# Patient Record
Sex: MALE | Race: WHITE | Employment: OTHER | URBAN - METROPOLITAN AREA
[De-identification: names, ages, dates, MRNs, and addresses within clinical notes are randomized per-mention and may not be internally consistent; named-entity substitution may affect disease eponyms.]

---

## 2022-06-09 RX ORDER — LOSARTAN POTASSIUM 50 MG/1
50 TABLET ORAL DAILY
COMMUNITY

## 2022-06-09 RX ORDER — OMEPRAZOLE 20 MG/1
20 CAPSULE, DELAYED RELEASE ORAL DAILY
COMMUNITY

## 2022-06-09 RX ORDER — TORSEMIDE 10 MG/1
10 TABLET ORAL DAILY
COMMUNITY

## 2022-06-09 RX ORDER — ATORVASTATIN CALCIUM 40 MG/1
40 TABLET, FILM COATED ORAL NIGHTLY
COMMUNITY

## 2022-06-09 RX ORDER — ASPIRIN 81 MG/1
81 TABLET ORAL DAILY
COMMUNITY

## 2022-06-09 RX ORDER — INSULIN LISPRO 100 [IU]/ML
INJECTION, SOLUTION INTRAVENOUS; SUBCUTANEOUS
COMMUNITY

## 2022-06-09 RX ORDER — INSULIN GLARGINE 100 [IU]/ML
INJECTION, SOLUTION SUBCUTANEOUS NIGHTLY
COMMUNITY

## 2022-06-09 RX ORDER — AMLODIPINE BESYLATE 2.5 MG/1
2.5 TABLET ORAL NIGHTLY
COMMUNITY

## 2022-06-09 RX ORDER — LEVOTHYROXINE SODIUM 0.12 MG/1
125 TABLET ORAL DAILY
COMMUNITY

## 2022-06-09 RX ORDER — GABAPENTIN 300 MG/1
300 CAPSULE ORAL 4 TIMES DAILY
COMMUNITY

## 2022-06-09 NOTE — PROGRESS NOTES
Patient sent link to watch Joint Education Class  Emailed joint education materials to patient about medication education, helpful tips, pre-surgical checklist, and pre-op showering instructions. Received an e-mail confirmation of patient viewing video. Pre-test and post-test done. Patient is aware that may contact me for any questions. My work email address and phone number given. Pre-test:4/5  Post-test:4/5    Sent patient answer key and explanation of correct answers. DOS: 7/14  Dr Leif Sagastume Nurse Navigator  [phone]  [email or web address]

## 2022-06-09 NOTE — PROGRESS NOTES
Spoke with wife. Patient would like to do outpatient surgery, had a bad experience staying overnight in the hospital.  Discussed the same day surgery process and recovery. Patient would like to have home care after surgery. Has Medicare and lives in [facility].

## 2022-06-09 NOTE — PROGRESS NOTES
It is strongly suggested someone stay with you the first 24 hrs. Your surgery will be cancelled if you do not have a ride home. 8. A parent/legal guardian must accompany a child scheduled for surgery and plan to stay at the hospital until the child is discharged. Please do not bring other children with you. 9. Please wear simple, loose fitting clothing to the hospital.  Katelyn Perez not bring valuables (money, credit cards, checkbooks, etc.) Do not wear any makeup (including no eye makeup) or nail polish on your fingers or toes. 10. DO NOT wear any jewelry or piercings on day of surgery. All body piercing jewelry must be removed. 11. If you have ___dentures, they will be removed before going to the OR; we will provide you a container. If you wear ___contact lenses or ___glasses, they will be removed; please bring a case for them. 12. Please see your family doctor/pediatrician for a history & physical and/or concerning medications. Bring any test results/reports from your physician's office. PCP__________________Phone___________H&P Appt. Date________             13 If you  have a Living Will and Durable Power of  for Healthcare, please bring in a copy. 15. Notify your Surgeon if you develop any illness between now and surgery  time, cough, cold, fever, sore throat, nausea, vomiting, etc.  Please notify your surgeon if you experience dizziness, shortness of breath or blurred vision between now & the time of your surgery             15. DO NOT shave your operative site 96 hours prior to surgery. For face & neck surgery, men may use an electric razor 48 hours prior to surgery. 16. Shower the night before or morning of surgery using an antibacterial soap or as you have been instructed. 17. To provide excellent care visitors will be limited to one in the room at any given time. 18.  Please bring picture ID and insurance card. 19.  Visit our web site for additional information:  [email or web address]              20.During flu season no children under the age of 15 are permitted in the hospital for the safety of all patients. x    21. If you take a long acting insulin in the evening only  take half of your usual  dose the night  before your procedure              22. If you use a c-pap please bring DOS if staying overnight,             23.For your convenience [id number] has a pharmacy on site to fill your prescriptions. 24. If you use oxygen and have a portable tank please bring it  with you the DOS             25. Bring a complete list of all your medications with name and dose include any supplements. 26. Other__________________________________________   *Please call pre admission testing if you any further questions   [facility]   [address] [place] [address]. [facility]  [phone]   [address] [place] [address]       VISITOR POLICY(subject to change)    Current policy is 2 visitors per patient. No children. A mask is required. Visiting hours are 8a-8p. Overnight visitors will be at the discretion of the nurse. All above information reviewed with patient in person or by phone. Patient verbalizes understanding. All questions and concerns addressed.                                                                                                  Patient/Rep___per phone/pt_________________                                                                                                                                    PRE OP INSTRUCTIONS

## 2022-06-09 NOTE — PROGRESS NOTES
The patient will attend class on___virtual  ST aware____________  The patent will get ordered PATs on__by 7/1 at PCP_________________________________  The patient will see PCP within 30 days of scheduled surgery___7/7_______

## 2022-06-21 NOTE — PROGRESS NOTES
Pre-op instructions reviewed with patients wife. They are going to come to [facility] for the PATs and  a folder-they have the soap.     Confirmed time change on 7/14/  to 1100 with an arrival of 0900

## 2022-06-22 ENCOUNTER — HOSPITAL ENCOUNTER (OUTPATIENT)
Age: 82
Discharge: HOME | End: 2022-06-22
Payer: MEDICARE

## 2022-06-22 DIAGNOSIS — Z01.818 PREOP TESTING: ICD-10-CM

## 2022-06-22 LAB
A/G RATIO: 1.5 (ref 1.1–2.2)
ABO/RH: NORMAL
ALBUMIN SERPL-MCNC: 4.3 G/DL (ref 3.4–5)
ALP BLD-CCNC: 80 U/L (ref 40–129)
ALT SERPL-CCNC: 17 U/L (ref 10–40)
ANION GAP SERPL CALCULATED.3IONS-SCNC: 15 MMOL/L (ref 3–16)
ANTIBODY SCREEN: NORMAL
APTT: 36.6 SEC (ref 23–34.3)
AST SERPL-CCNC: 25 U/L (ref 15–37)
BASOPHILS ABSOLUTE: 0 K/UL (ref 0–0.2)
BASOPHILS RELATIVE PERCENT: 0.5 %
BILIRUB SERPL-MCNC: 0.6 MG/DL (ref 0–1)
BILIRUBIN URINE: NEGATIVE
BLOOD, URINE: NEGATIVE
BUN BLDV-MCNC: 36 MG/DL (ref 7–20)
CALCIUM SERPL-MCNC: 9.1 MG/DL (ref 8.3–10.6)
CHLORIDE BLD-SCNC: 101 MMOL/L (ref 99–110)
CLARITY: CLEAR
CO2: 23 MMOL/L (ref 21–32)
COLOR: YELLOW
CREAT SERPL-MCNC: 1.5 MG/DL (ref 0.8–1.3)
EOSINOPHILS ABSOLUTE: 0.1 K/UL (ref 0–0.6)
EOSINOPHILS RELATIVE PERCENT: 0.9 %
GFR AFRICAN AMERICAN: 54
GFR NON-AFRICAN AMERICAN: 45
GLUCOSE BLD-MCNC: 42 MG/DL (ref 70–99)
GLUCOSE URINE: NEGATIVE MG/DL
HCT VFR BLD CALC: 43 % (ref 40.5–52.5)
HEMOGLOBIN: 14.8 G/DL (ref 13.5–17.5)
INR BLD: 1.5 (ref 0.87–1.14)
KETONES, URINE: NEGATIVE MG/DL
LEUKOCYTE ESTERASE, URINE: NEGATIVE
LYMPHOCYTES ABSOLUTE: 2.1 K/UL (ref 1–5.1)
LYMPHOCYTES RELATIVE PERCENT: 32.9 %
MCH RBC QN AUTO: 33.7 PG (ref 26–34)
MCHC RBC AUTO-ENTMCNC: 34.3 G/DL (ref 31–36)
MCV RBC AUTO: 98.1 FL (ref 80–100)
MICROSCOPIC EXAMINATION: NORMAL
MONOCYTES ABSOLUTE: 0.3 K/UL (ref 0–1.3)
MONOCYTES RELATIVE PERCENT: 4.7 %
NEUTROPHILS ABSOLUTE: 3.8 K/UL (ref 1.7–7.7)
NEUTROPHILS RELATIVE PERCENT: 61 %
NITRITE, URINE: NEGATIVE
PDW BLD-RTO: 13.3 % (ref 12.4–15.4)
PH UA: 6 (ref 5–8)
PLATELET # BLD: 140 K/UL (ref 135–450)
PMV BLD AUTO: 9.6 FL (ref 5–10.5)
POTASSIUM SERPL-SCNC: 4.1 MMOL/L (ref 3.5–5.1)
PROTEIN UA: NEGATIVE MG/DL
PROTHROMBIN TIME: 18.1 SEC (ref 11.7–14.5)
RBC # BLD: 4.39 M/UL (ref 4.2–5.9)
SODIUM BLD-SCNC: 139 MMOL/L (ref 136–145)
SPECIFIC GRAVITY UA: 1.02 (ref 1–1.03)
TOTAL PROTEIN: 7.2 G/DL (ref 6.4–8.2)
URINE TYPE: NORMAL
UROBILINOGEN, URINE: 0.2 E.U./DL
WBC # BLD: 6.3 K/UL (ref 4–11)

## 2022-06-22 PROCEDURE — 86850 RBC ANTIBODY SCREEN: CPT

## 2022-06-22 PROCEDURE — 86900 BLOOD TYPING SEROLOGIC ABO: CPT

## 2022-06-22 PROCEDURE — 87081 CULTURE SCREEN ONLY: CPT

## 2022-06-22 PROCEDURE — 36415 COLL VENOUS BLD VENIPUNCTURE: CPT

## 2022-06-22 PROCEDURE — 87086 URINE CULTURE/COLONY COUNT: CPT

## 2022-06-22 PROCEDURE — 81003 URINALYSIS AUTO W/O SCOPE: CPT

## 2022-06-22 PROCEDURE — 85730 THROMBOPLASTIN TIME PARTIAL: CPT

## 2022-06-22 PROCEDURE — 85610 PROTHROMBIN TIME: CPT

## 2022-06-22 PROCEDURE — 80053 COMPREHEN METABOLIC PANEL: CPT

## 2022-06-22 PROCEDURE — 85025 COMPLETE CBC W/AUTO DIFF WBC: CPT

## 2022-06-22 PROCEDURE — 86901 BLOOD TYPING SEROLOGIC RH(D): CPT

## 2022-06-24 LAB — MRSA CULTURE ONLY: NORMAL

## 2022-06-25 LAB — URINE CULTURE, ROUTINE: NORMAL

## 2022-07-08 NOTE — DISCHARGE INSTR - OTHER ORDERS
333  [facility] for Joint Replacement Model  Notification Letter    [facility] is participating in a New Care Improvement Initiative from Erlinda Lim    [facility] is participating in a Medicare initiative called the [address] [place] [address] for Joint Replacement (CJR) model. The CJR model aims to promote quality and financial accountability for care surrounding lower-extremity joint replacement (LEJR) procedures, commonly referred to as hip and knee replacements and/or other major leg procedures. [address] participation in the [address] [place] [address],[address] [facility] should not restrict your access to care for your medical condition or your freedom to choose your health care providers and services. All existing Medicare beneficiary protections continue to be available to you. These include the ability to report concerns of substandard care to Quality Improvement Organizations and 1-800-MEDICARE. The CJR model aims to help give you better care. The CJR model aims to support better and more efficient care for beneficiaries undergoing LEJR procedures. A CJR episode of care is typically defined as an admission of an eligible Medicare beneficiary to a hospital participating in the [address] [place] [address],[address] [facility] that eventually results in a discharge paid under Medicare Severity-Diagnosis Related Groups (MS-DRG) 469 (major joint replacement or reattachment of lower extremity with major complications or comorbidities) or 470 (major joint replacement or reattachment of lower extremity without major complications or comorbidities). The CJR episode of care continues for 90 days following discharge.  This model tests bundled payment and quality measurement for an episode of care associated with LEJR procedures to encourage hospitals, physicians, and post-acute care providers to work together to improve the quality and coordination of care from the initial hospitalization through recovery. Through this bundled payment model, [facility] will receive additional payments if quality and spending performance are strong or, if not, potentially have to repay Medicare for a portion of the spending for care surrounding a lower extremity joint replacement procedure. Medicare is using the CJR model to encourage [facility] to work more closely with your doctors and other health care providers that help patients recover after discharge from the hospital, including nursing homes (skilled nursing facilities), home health agencies, inpatient rehabilitation facilities, and long-term care hospitals. The goal of the model is to encourage these providers and suppliers to provide you with better, more coordinated care during and following your hospital stay. The model is expected to lower the cost of care to [facility] but your costs for covered care will not increase due to these changes. [facility] is working closely with the doctors and other health care providers and suppliers who will care for you during and following your hospital stay and extending through the recovery period. By working together, your health care providers and suppliers are planning more efficient, high quality care as you undergo treatment. Medicare will monitor your care to ensure you and others are receiving high quality care. It's your choice which hospital, doctor, or other providers you use. You have the right to choose which hospital, doctor, or other post-hospital stay health care provider you use. To find a different doctor, visit Medicare's Physician Compare website, HDTapes.co.nz, or call 1-800-MEDICARE (960 3845). TTY users should call 7-257.765.9712. To find a different hospital, visit [employer] or call 1-800-MEDICARE (497 6453).  TTY users should call 8-386.101.9835. To find a different skilled nursing facility, visit [facility] website, [email or web address]://[email or web address].cheerapp.[email or web address]/, or call 1-800-MEDICARE (9-766.638.7462). TTY users should call 7-672.402.5409. To find a different home health agency, visit [employer] website, [employer].Sverve, or call 1-800-MEDICARE (2-328.845.1061). TTY users should call 6-566.944.3427. If you believe that your care is adversely affected or have concerns about substandard care, you may call 1-800-MEDICARE or contact your state's Quality Improvement Organization by going to: [employer].InsideView. For an explanation of how patients can access their health care records and beneficiary claims data, please visit [facility].is. Get more information     If you have questions or want more information about the Comprehensive Care for Joint Replacement (CJR) model, call Chidi Shannon RN, Care Transitions, [facility] at [phone] or call 1-800-MEDICARE. You can also find additional information at https://innovation.cms.gov/initiatives/cjr.

## 2022-07-14 ENCOUNTER — HOSPITAL ENCOUNTER (INPATIENT)
Age: 82
LOS: 1 days | Discharge: HOME HEALTH | DRG: 467 | End: 2022-07-15
Attending: ORTHOPAEDIC SURGERY | Admitting: ORTHOPAEDIC SURGERY
Payer: MEDICARE

## 2022-07-14 ENCOUNTER — ANESTHESIA EVENT (OUTPATIENT)
Dept: OPERATING ROOM | Age: 82
DRG: 467 | End: 2022-07-14
Payer: MEDICARE

## 2022-07-14 ENCOUNTER — APPOINTMENT (OUTPATIENT)
Dept: GENERAL RADIOLOGY | Age: 82
DRG: 467 | End: 2022-07-14
Attending: ORTHOPAEDIC SURGERY
Payer: MEDICARE

## 2022-07-14 ENCOUNTER — ANESTHESIA (OUTPATIENT)
Dept: OPERATING ROOM | Age: 82
DRG: 467 | End: 2022-07-14
Payer: MEDICARE

## 2022-07-14 DIAGNOSIS — Z01.818 PREOP TESTING: Primary | ICD-10-CM

## 2022-07-14 DIAGNOSIS — T84.093D FAILED TOTAL KNEE, LEFT, SUBSEQUENT ENCOUNTER: ICD-10-CM

## 2022-07-14 PROBLEM — I10 HTN (HYPERTENSION): Status: ACTIVE | Noted: 2022-07-14

## 2022-07-14 PROBLEM — E03.9 HYPOTHYROID: Status: ACTIVE | Noted: 2022-07-14

## 2022-07-14 PROBLEM — T84.012D FAILED TOTAL KNEE, RIGHT, SUBSEQUENT ENCOUNTER: Status: ACTIVE | Noted: 2022-07-14

## 2022-07-14 PROBLEM — E11.9 DM2 (DIABETES MELLITUS, TYPE 2) (HCC): Status: ACTIVE | Noted: 2022-07-14

## 2022-07-14 LAB
ABO/RH: NORMAL
ANION GAP SERPL CALCULATED.3IONS-SCNC: 9 MMOL/L (ref 3–16)
ANTIBODY SCREEN: NORMAL
APPEARANCE FLUID: NORMAL
APTT: 32.5 SEC (ref 23–34.3)
BUN BLDV-MCNC: 46 MG/DL (ref 7–20)
CALCIUM SERPL-MCNC: 8.7 MG/DL (ref 8.3–10.6)
CELL COUNT FLUID TYPE: NORMAL
CHLORIDE BLD-SCNC: 104 MMOL/L (ref 99–110)
CLOT EVALUATION: NORMAL
CO2: 24 MMOL/L (ref 21–32)
COLOR FLUID: YELLOW
CREAT SERPL-MCNC: 1.1 MG/DL (ref 0.8–1.3)
GFR AFRICAN AMERICAN: >60
GFR NON-AFRICAN AMERICAN: >60
GLUCOSE BLD-MCNC: 237 MG/DL (ref 70–99)
GLUCOSE BLD-MCNC: 238 MG/DL (ref 70–99)
GLUCOSE BLD-MCNC: 249 MG/DL (ref 70–99)
GLUCOSE BLD-MCNC: 276 MG/DL (ref 70–99)
GLUCOSE BLD-MCNC: 309 MG/DL (ref 70–99)
INR BLD: 1.16 (ref 0.87–1.14)
LYMPHOCYTES, BODY FLUID: 25 %
MACROPHAGE FLUID: 12 %
MONOCYTE, FLUID: 32 %
NEUTROPHIL, FLUID: 31 %
NUCLEATED CELLS FLUID: 456 /CUMM
NUMBER OF CELLS COUNTED FLUID: 100
PERFORMED ON: ABNORMAL
POTASSIUM SERPL-SCNC: 5.5 MMOL/L (ref 3.5–5.1)
PROTHROMBIN TIME: 14.7 SEC (ref 11.7–14.5)
RBC FLUID: NORMAL /CUMM
SODIUM BLD-SCNC: 137 MMOL/L (ref 136–145)

## 2022-07-14 PROCEDURE — 6370000000 HC RX 637 (ALT 250 FOR IP): Performed by: INTERNAL MEDICINE

## 2022-07-14 PROCEDURE — 85730 THROMBOPLASTIN TIME PARTIAL: CPT

## 2022-07-14 PROCEDURE — 6370000000 HC RX 637 (ALT 250 FOR IP): Performed by: ORTHOPAEDIC SURGERY

## 2022-07-14 PROCEDURE — 0SRD0J9 REPLACEMENT OF LEFT KNEE JOINT WITH SYNTHETIC SUBSTITUTE, CEMENTED, OPEN APPROACH: ICD-10-PCS | Performed by: ORTHOPAEDIC SURGERY

## 2022-07-14 PROCEDURE — A4217 STERILE WATER/SALINE, 500 ML: HCPCS | Performed by: ORTHOPAEDIC SURGERY

## 2022-07-14 PROCEDURE — 86850 RBC ANTIBODY SCREEN: CPT

## 2022-07-14 PROCEDURE — 2580000003 HC RX 258: Performed by: ORTHOPAEDIC SURGERY

## 2022-07-14 PROCEDURE — 6360000002 HC RX W HCPCS

## 2022-07-14 PROCEDURE — 2500000003 HC RX 250 WO HCPCS: Performed by: NURSE ANESTHETIST, CERTIFIED REGISTERED

## 2022-07-14 PROCEDURE — 87205 SMEAR GRAM STAIN: CPT

## 2022-07-14 PROCEDURE — 36415 COLL VENOUS BLD VENIPUNCTURE: CPT

## 2022-07-14 PROCEDURE — 76942 ECHO GUIDE FOR BIOPSY: CPT | Performed by: ANESTHESIOLOGY

## 2022-07-14 PROCEDURE — 80048 BASIC METABOLIC PNL TOTAL CA: CPT

## 2022-07-14 PROCEDURE — 87070 CULTURE OTHR SPECIMN AEROBIC: CPT

## 2022-07-14 PROCEDURE — 0SPD0JZ REMOVAL OF SYNTHETIC SUBSTITUTE FROM LEFT KNEE JOINT, OPEN APPROACH: ICD-10-PCS | Performed by: ORTHOPAEDIC SURGERY

## 2022-07-14 PROCEDURE — 64447 NJX AA&/STRD FEMORAL NRV IMG: CPT | Performed by: ANESTHESIOLOGY

## 2022-07-14 PROCEDURE — 85610 PROTHROMBIN TIME: CPT

## 2022-07-14 PROCEDURE — 3700000001 HC ADD 15 MINUTES (ANESTHESIA): Performed by: ORTHOPAEDIC SURGERY

## 2022-07-14 PROCEDURE — 6360000002 HC RX W HCPCS: Performed by: ORTHOPAEDIC SURGERY

## 2022-07-14 PROCEDURE — 1200000000 HC SEMI PRIVATE

## 2022-07-14 PROCEDURE — 7100000001 HC PACU RECOVERY - ADDTL 15 MIN: Performed by: ORTHOPAEDIC SURGERY

## 2022-07-14 PROCEDURE — 3700000000 HC ANESTHESIA ATTENDED CARE: Performed by: ORTHOPAEDIC SURGERY

## 2022-07-14 PROCEDURE — 97116 GAIT TRAINING THERAPY: CPT

## 2022-07-14 PROCEDURE — 73560 X-RAY EXAM OF KNEE 1 OR 2: CPT

## 2022-07-14 PROCEDURE — 97530 THERAPEUTIC ACTIVITIES: CPT

## 2022-07-14 PROCEDURE — 86900 BLOOD TYPING SEROLOGIC ABO: CPT

## 2022-07-14 PROCEDURE — 6360000002 HC RX W HCPCS: Performed by: NURSE ANESTHETIST, CERTIFIED REGISTERED

## 2022-07-14 PROCEDURE — 97535 SELF CARE MNGMENT TRAINING: CPT

## 2022-07-14 PROCEDURE — 86901 BLOOD TYPING SEROLOGIC RH(D): CPT

## 2022-07-14 PROCEDURE — 89051 BODY FLUID CELL COUNT: CPT

## 2022-07-14 PROCEDURE — 2709999900 HC NON-CHARGEABLE SUPPLY: Performed by: ORTHOPAEDIC SURGERY

## 2022-07-14 PROCEDURE — 3600000014 HC SURGERY LEVEL 4 ADDTL 15MIN: Performed by: ORTHOPAEDIC SURGERY

## 2022-07-14 PROCEDURE — 6370000000 HC RX 637 (ALT 250 FOR IP)

## 2022-07-14 PROCEDURE — 87075 CULTR BACTERIA EXCEPT BLOOD: CPT

## 2022-07-14 PROCEDURE — 2500000003 HC RX 250 WO HCPCS: Performed by: ORTHOPAEDIC SURGERY

## 2022-07-14 PROCEDURE — 2720000010 HC SURG SUPPLY STERILE: Performed by: ORTHOPAEDIC SURGERY

## 2022-07-14 PROCEDURE — 7100000000 HC PACU RECOVERY - FIRST 15 MIN: Performed by: ORTHOPAEDIC SURGERY

## 2022-07-14 PROCEDURE — 6360000002 HC RX W HCPCS: Performed by: ANESTHESIOLOGY

## 2022-07-14 PROCEDURE — C1776 JOINT DEVICE (IMPLANTABLE): HCPCS | Performed by: ORTHOPAEDIC SURGERY

## 2022-07-14 PROCEDURE — 83036 HEMOGLOBIN GLYCOSYLATED A1C: CPT

## 2022-07-14 PROCEDURE — C1713 ANCHOR/SCREW BN/BN,TIS/BN: HCPCS | Performed by: ORTHOPAEDIC SURGERY

## 2022-07-14 PROCEDURE — 97162 PT EVAL MOD COMPLEX 30 MIN: CPT

## 2022-07-14 PROCEDURE — 97166 OT EVAL MOD COMPLEX 45 MIN: CPT

## 2022-07-14 PROCEDURE — 3600000004 HC SURGERY LEVEL 4 BASE: Performed by: ORTHOPAEDIC SURGERY

## 2022-07-14 DEVICE — CEMENT BNE 40GM HI VISC RADPQ FOR REV SURG: Type: IMPLANTABLE DEVICE | Site: KNEE | Status: FUNCTIONAL

## 2022-07-14 DEVICE — IMPLANTABLE DEVICE: Type: IMPLANTABLE DEVICE | Site: KNEE | Status: FUNCTIONAL

## 2022-07-14 RX ORDER — HALOPERIDOL 5 MG/ML
1 INJECTION INTRAMUSCULAR
Status: DISCONTINUED | OUTPATIENT
Start: 2022-07-14 | End: 2022-07-14 | Stop reason: HOSPADM

## 2022-07-14 RX ORDER — SODIUM CHLORIDE 9 MG/ML
INJECTION, SOLUTION INTRAVENOUS CONTINUOUS
Status: DISCONTINUED | OUTPATIENT
Start: 2022-07-14 | End: 2022-07-15 | Stop reason: HOSPADM

## 2022-07-14 RX ORDER — POTASSIUM CHLORIDE 7.45 MG/ML
10 INJECTION INTRAVENOUS PRN
Status: DISCONTINUED | OUTPATIENT
Start: 2022-07-14 | End: 2022-07-15 | Stop reason: HOSPADM

## 2022-07-14 RX ORDER — OXYCODONE HYDROCHLORIDE 5 MG/1
10 TABLET ORAL EVERY 4 HOURS PRN
Status: DISCONTINUED | OUTPATIENT
Start: 2022-07-14 | End: 2022-07-15 | Stop reason: HOSPADM

## 2022-07-14 RX ORDER — GLYCOPYRROLATE 0.2 MG/ML
INJECTION INTRAMUSCULAR; INTRAVENOUS PRN
Status: DISCONTINUED | OUTPATIENT
Start: 2022-07-14 | End: 2022-07-14 | Stop reason: SDUPTHER

## 2022-07-14 RX ORDER — FENTANYL CITRATE 50 UG/ML
25 INJECTION, SOLUTION INTRAMUSCULAR; INTRAVENOUS EVERY 5 MIN PRN
Status: DISCONTINUED | OUTPATIENT
Start: 2022-07-14 | End: 2022-07-14 | Stop reason: HOSPADM

## 2022-07-14 RX ORDER — CEFADROXIL 500 MG/1
500 CAPSULE ORAL 2 TIMES DAILY
Qty: 14 CAPSULE | Refills: 0 | Status: SHIPPED | OUTPATIENT
Start: 2022-07-14 | End: 2022-07-15 | Stop reason: SDUPTHER

## 2022-07-14 RX ORDER — DOCUSATE SODIUM 100 MG/1
100 CAPSULE, LIQUID FILLED ORAL 2 TIMES DAILY
Qty: 60 CAPSULE | Refills: 0 | Status: SHIPPED | OUTPATIENT
Start: 2022-07-14 | End: 2022-08-13

## 2022-07-14 RX ORDER — ASPIRIN 81 MG/1
81 TABLET ORAL DAILY
Status: DISCONTINUED | OUTPATIENT
Start: 2022-07-14 | End: 2022-07-15 | Stop reason: HOSPADM

## 2022-07-14 RX ORDER — PROPOFOL 10 MG/ML
INJECTION, EMULSION INTRAVENOUS PRN
Status: DISCONTINUED | OUTPATIENT
Start: 2022-07-14 | End: 2022-07-14 | Stop reason: SDUPTHER

## 2022-07-14 RX ORDER — SODIUM CHLORIDE 0.9 % (FLUSH) 0.9 %
5-40 SYRINGE (ML) INJECTION PRN
Status: DISCONTINUED | OUTPATIENT
Start: 2022-07-14 | End: 2022-07-15 | Stop reason: HOSPADM

## 2022-07-14 RX ORDER — MAGNESIUM HYDROXIDE 1200 MG/15ML
LIQUID ORAL CONTINUOUS PRN
Status: COMPLETED | OUTPATIENT
Start: 2022-07-14 | End: 2022-07-14

## 2022-07-14 RX ORDER — OXYCODONE HYDROCHLORIDE 5 MG/1
5 TABLET ORAL PRN
Status: DISCONTINUED | OUTPATIENT
Start: 2022-07-14 | End: 2022-07-14 | Stop reason: HOSPADM

## 2022-07-14 RX ORDER — INSULIN LISPRO 100 [IU]/ML
0-6 INJECTION, SOLUTION INTRAVENOUS; SUBCUTANEOUS NIGHTLY
Status: DISCONTINUED | OUTPATIENT
Start: 2022-07-14 | End: 2022-07-15 | Stop reason: HOSPADM

## 2022-07-14 RX ORDER — FENTANYL CITRATE 50 UG/ML
50 INJECTION, SOLUTION INTRAMUSCULAR; INTRAVENOUS EVERY 5 MIN PRN
Status: DISCONTINUED | OUTPATIENT
Start: 2022-07-14 | End: 2022-07-14 | Stop reason: HOSPADM

## 2022-07-14 RX ORDER — INSULIN LISPRO 100 [IU]/ML
0-12 INJECTION, SOLUTION INTRAVENOUS; SUBCUTANEOUS
Status: DISCONTINUED | OUTPATIENT
Start: 2022-07-14 | End: 2022-07-15 | Stop reason: HOSPADM

## 2022-07-14 RX ORDER — 0.9 % SODIUM CHLORIDE 0.9 %
500 INTRAVENOUS SOLUTION INTRAVENOUS PRN
Status: DISCONTINUED | OUTPATIENT
Start: 2022-07-14 | End: 2022-07-15 | Stop reason: HOSPADM

## 2022-07-14 RX ORDER — ACETAMINOPHEN 325 MG/1
TABLET ORAL
Status: COMPLETED
Start: 2022-07-14 | End: 2022-07-14

## 2022-07-14 RX ORDER — SODIUM CHLORIDE 9 MG/ML
INJECTION, SOLUTION INTRAVENOUS CONTINUOUS
Status: DISCONTINUED | OUTPATIENT
Start: 2022-07-14 | End: 2022-07-14 | Stop reason: HOSPADM

## 2022-07-14 RX ORDER — OXYCODONE HYDROCHLORIDE 5 MG/1
10 TABLET ORAL PRN
Status: DISCONTINUED | OUTPATIENT
Start: 2022-07-14 | End: 2022-07-14 | Stop reason: HOSPADM

## 2022-07-14 RX ORDER — ACETAMINOPHEN 325 MG/1
650 TABLET ORAL ONCE
Status: COMPLETED | OUTPATIENT
Start: 2022-07-14 | End: 2022-07-14

## 2022-07-14 RX ORDER — DEXAMETHASONE SODIUM PHOSPHATE 4 MG/ML
INJECTION, SOLUTION INTRA-ARTICULAR; INTRALESIONAL; INTRAMUSCULAR; INTRAVENOUS; SOFT TISSUE PRN
Status: DISCONTINUED | OUTPATIENT
Start: 2022-07-14 | End: 2022-07-14 | Stop reason: SDUPTHER

## 2022-07-14 RX ORDER — ATORVASTATIN CALCIUM 40 MG/1
40 TABLET, FILM COATED ORAL NIGHTLY
Status: DISCONTINUED | OUTPATIENT
Start: 2022-07-14 | End: 2022-07-15 | Stop reason: HOSPADM

## 2022-07-14 RX ORDER — FENTANYL CITRATE 50 UG/ML
100 INJECTION, SOLUTION INTRAMUSCULAR; INTRAVENOUS ONCE
Status: COMPLETED | OUTPATIENT
Start: 2022-07-14 | End: 2022-07-14

## 2022-07-14 RX ORDER — OXYCODONE HYDROCHLORIDE 5 MG/1
5 TABLET ORAL EVERY 6 HOURS PRN
Qty: 28 TABLET | Refills: 0 | Status: SHIPPED | OUTPATIENT
Start: 2022-07-14 | End: 2022-07-21

## 2022-07-14 RX ORDER — LIDOCAINE HYDROCHLORIDE 10 MG/ML
0.5 INJECTION, SOLUTION EPIDURAL; INFILTRATION; INTRACAUDAL; PERINEURAL ONCE
Status: DISCONTINUED | OUTPATIENT
Start: 2022-07-14 | End: 2022-07-14 | Stop reason: HOSPADM

## 2022-07-14 RX ORDER — MAGNESIUM HYDROXIDE 1200 MG/15ML
LIQUID ORAL
Status: COMPLETED | OUTPATIENT
Start: 2022-07-14 | End: 2022-07-14

## 2022-07-14 RX ORDER — FAMOTIDINE 10 MG/ML
INJECTION, SOLUTION INTRAVENOUS PRN
Status: DISCONTINUED | OUTPATIENT
Start: 2022-07-14 | End: 2022-07-14 | Stop reason: SDUPTHER

## 2022-07-14 RX ORDER — HYDROMORPHONE HYDROCHLORIDE 1 MG/ML
0.5 INJECTION, SOLUTION INTRAMUSCULAR; INTRAVENOUS; SUBCUTANEOUS
Status: DISCONTINUED | OUTPATIENT
Start: 2022-07-14 | End: 2022-07-15 | Stop reason: HOSPADM

## 2022-07-14 RX ORDER — INSULIN LISPRO 100 [IU]/ML
10 INJECTION, SOLUTION INTRAVENOUS; SUBCUTANEOUS
Status: DISCONTINUED | OUTPATIENT
Start: 2022-07-14 | End: 2022-07-15 | Stop reason: HOSPADM

## 2022-07-14 RX ORDER — SUCCINYLCHOLINE CHLORIDE 20 MG/ML
INJECTION INTRAMUSCULAR; INTRAVENOUS PRN
Status: DISCONTINUED | OUTPATIENT
Start: 2022-07-14 | End: 2022-07-14 | Stop reason: SDUPTHER

## 2022-07-14 RX ORDER — AMLODIPINE BESYLATE 5 MG/1
2.5 TABLET ORAL NIGHTLY
Status: DISCONTINUED | OUTPATIENT
Start: 2022-07-14 | End: 2022-07-15 | Stop reason: HOSPADM

## 2022-07-14 RX ORDER — TORSEMIDE 20 MG/1
10 TABLET ORAL DAILY
Status: DISCONTINUED | OUTPATIENT
Start: 2022-07-14 | End: 2022-07-15 | Stop reason: HOSPADM

## 2022-07-14 RX ORDER — LABETALOL HYDROCHLORIDE 5 MG/ML
10 INJECTION, SOLUTION INTRAVENOUS
Status: DISCONTINUED | OUTPATIENT
Start: 2022-07-14 | End: 2022-07-14 | Stop reason: HOSPADM

## 2022-07-14 RX ORDER — ROCURONIUM BROMIDE 10 MG/ML
INJECTION, SOLUTION INTRAVENOUS PRN
Status: DISCONTINUED | OUTPATIENT
Start: 2022-07-14 | End: 2022-07-14 | Stop reason: SDUPTHER

## 2022-07-14 RX ORDER — ONDANSETRON 2 MG/ML
4 INJECTION INTRAMUSCULAR; INTRAVENOUS
Status: DISCONTINUED | OUTPATIENT
Start: 2022-07-14 | End: 2022-07-14 | Stop reason: HOSPADM

## 2022-07-14 RX ORDER — ALOGLIPTIN 12.5 MG/1
12.5 TABLET, FILM COATED ORAL DAILY
Status: DISCONTINUED | OUTPATIENT
Start: 2022-07-14 | End: 2022-07-15 | Stop reason: HOSPADM

## 2022-07-14 RX ORDER — ONDANSETRON 2 MG/ML
INJECTION INTRAMUSCULAR; INTRAVENOUS PRN
Status: DISCONTINUED | OUTPATIENT
Start: 2022-07-14 | End: 2022-07-14 | Stop reason: SDUPTHER

## 2022-07-14 RX ORDER — GABAPENTIN 300 MG/1
300 CAPSULE ORAL 4 TIMES DAILY
Status: DISCONTINUED | OUTPATIENT
Start: 2022-07-14 | End: 2022-07-15 | Stop reason: HOSPADM

## 2022-07-14 RX ORDER — FENTANYL CITRATE 50 UG/ML
INJECTION, SOLUTION INTRAMUSCULAR; INTRAVENOUS
Status: COMPLETED
Start: 2022-07-14 | End: 2022-07-14

## 2022-07-14 RX ORDER — SODIUM CHLORIDE 0.9 % (FLUSH) 0.9 %
5-40 SYRINGE (ML) INJECTION EVERY 12 HOURS SCHEDULED
Status: DISCONTINUED | OUTPATIENT
Start: 2022-07-14 | End: 2022-07-15 | Stop reason: HOSPADM

## 2022-07-14 RX ORDER — SODIUM CHLORIDE 9 MG/ML
INJECTION, SOLUTION INTRAVENOUS PRN
Status: DISCONTINUED | OUTPATIENT
Start: 2022-07-14 | End: 2022-07-15 | Stop reason: HOSPADM

## 2022-07-14 RX ORDER — MAGNESIUM SULFATE HEPTAHYDRATE 500 MG/ML
INJECTION, SOLUTION INTRAMUSCULAR; INTRAVENOUS PRN
Status: DISCONTINUED | OUTPATIENT
Start: 2022-07-14 | End: 2022-07-14 | Stop reason: SDUPTHER

## 2022-07-14 RX ORDER — ROPIVACAINE HYDROCHLORIDE 5 MG/ML
INJECTION, SOLUTION EPIDURAL; INFILTRATION; PERINEURAL
Status: COMPLETED | OUTPATIENT
Start: 2022-07-14 | End: 2022-07-14

## 2022-07-14 RX ORDER — POTASSIUM CHLORIDE 20 MEQ/1
40 TABLET, EXTENDED RELEASE ORAL PRN
Status: DISCONTINUED | OUTPATIENT
Start: 2022-07-14 | End: 2022-07-15 | Stop reason: HOSPADM

## 2022-07-14 RX ORDER — LOSARTAN POTASSIUM 25 MG/1
50 TABLET ORAL DAILY
Status: DISCONTINUED | OUTPATIENT
Start: 2022-07-14 | End: 2022-07-15 | Stop reason: HOSPADM

## 2022-07-14 RX ORDER — VANCOMYCIN HYDROCHLORIDE 1 G/20ML
INJECTION, POWDER, LYOPHILIZED, FOR SOLUTION INTRAVENOUS
Status: COMPLETED | OUTPATIENT
Start: 2022-07-14 | End: 2022-07-14

## 2022-07-14 RX ORDER — LEVOTHYROXINE SODIUM 0.12 MG/1
125 TABLET ORAL DAILY
Status: DISCONTINUED | OUTPATIENT
Start: 2022-07-15 | End: 2022-07-15 | Stop reason: HOSPADM

## 2022-07-14 RX ORDER — HYDROMORPHONE HYDROCHLORIDE 1 MG/ML
0.25 INJECTION, SOLUTION INTRAMUSCULAR; INTRAVENOUS; SUBCUTANEOUS
Status: DISCONTINUED | OUTPATIENT
Start: 2022-07-14 | End: 2022-07-15 | Stop reason: HOSPADM

## 2022-07-14 RX ORDER — DEXTROSE MONOHYDRATE 50 MG/ML
100 INJECTION, SOLUTION INTRAVENOUS PRN
Status: DISCONTINUED | OUTPATIENT
Start: 2022-07-14 | End: 2022-07-15 | Stop reason: HOSPADM

## 2022-07-14 RX ORDER — KETAMINE HYDROCHLORIDE 10 MG/ML
INJECTION, SOLUTION INTRAMUSCULAR; INTRAVENOUS PRN
Status: DISCONTINUED | OUTPATIENT
Start: 2022-07-14 | End: 2022-07-14 | Stop reason: SDUPTHER

## 2022-07-14 RX ORDER — ACETAMINOPHEN 500 MG
1000 TABLET ORAL EVERY 8 HOURS SCHEDULED
Status: DISCONTINUED | OUTPATIENT
Start: 2022-07-14 | End: 2022-07-15 | Stop reason: HOSPADM

## 2022-07-14 RX ORDER — FENTANYL CITRATE 50 UG/ML
INJECTION, SOLUTION INTRAMUSCULAR; INTRAVENOUS PRN
Status: DISCONTINUED | OUTPATIENT
Start: 2022-07-14 | End: 2022-07-14 | Stop reason: SDUPTHER

## 2022-07-14 RX ORDER — MIDAZOLAM HYDROCHLORIDE 2 MG/2ML
2 INJECTION, SOLUTION INTRAMUSCULAR; INTRAVENOUS
Status: DISCONTINUED | OUTPATIENT
Start: 2022-07-14 | End: 2022-07-14 | Stop reason: HOSPADM

## 2022-07-14 RX ORDER — HYDRALAZINE HYDROCHLORIDE 20 MG/ML
10 INJECTION INTRAMUSCULAR; INTRAVENOUS
Status: DISCONTINUED | OUTPATIENT
Start: 2022-07-14 | End: 2022-07-14 | Stop reason: HOSPADM

## 2022-07-14 RX ORDER — ACETAMINOPHEN 500 MG
1000 TABLET ORAL 3 TIMES DAILY
COMMUNITY
Start: 2022-07-14

## 2022-07-14 RX ORDER — HYDRALAZINE HYDROCHLORIDE 20 MG/ML
10 INJECTION INTRAMUSCULAR; INTRAVENOUS EVERY 6 HOURS PRN
Status: DISCONTINUED | OUTPATIENT
Start: 2022-07-14 | End: 2022-07-15 | Stop reason: HOSPADM

## 2022-07-14 RX ORDER — DIPHENHYDRAMINE HYDROCHLORIDE 50 MG/ML
12.5 INJECTION INTRAMUSCULAR; INTRAVENOUS
Status: DISCONTINUED | OUTPATIENT
Start: 2022-07-14 | End: 2022-07-14 | Stop reason: HOSPADM

## 2022-07-14 RX ORDER — PANTOPRAZOLE SODIUM 40 MG/1
40 TABLET, DELAYED RELEASE ORAL
Status: DISCONTINUED | OUTPATIENT
Start: 2022-07-15 | End: 2022-07-15 | Stop reason: HOSPADM

## 2022-07-14 RX ORDER — INSULIN GLARGINE 100 [IU]/ML
35 INJECTION, SOLUTION SUBCUTANEOUS NIGHTLY
Status: DISCONTINUED | OUTPATIENT
Start: 2022-07-14 | End: 2022-07-15 | Stop reason: HOSPADM

## 2022-07-14 RX ORDER — OXYCODONE HYDROCHLORIDE 5 MG/1
5 TABLET ORAL EVERY 4 HOURS PRN
Status: DISCONTINUED | OUTPATIENT
Start: 2022-07-14 | End: 2022-07-15 | Stop reason: HOSPADM

## 2022-07-14 RX ORDER — BISACODYL 10 MG
10 SUPPOSITORY, RECTAL RECTAL DAILY PRN
Status: DISCONTINUED | OUTPATIENT
Start: 2022-07-14 | End: 2022-07-15 | Stop reason: HOSPADM

## 2022-07-14 RX ORDER — LIDOCAINE HYDROCHLORIDE 20 MG/ML
INJECTION, SOLUTION INFILTRATION; PERINEURAL PRN
Status: DISCONTINUED | OUTPATIENT
Start: 2022-07-14 | End: 2022-07-14 | Stop reason: SDUPTHER

## 2022-07-14 RX ADMIN — INSULIN LISPRO 2 UNITS: 100 INJECTION, SOLUTION INTRAVENOUS; SUBCUTANEOUS at 20:35

## 2022-07-14 RX ADMIN — ROPIVACAINE HYDROCHLORIDE 30 ML: 5 INJECTION, SOLUTION EPIDURAL; INFILTRATION; PERINEURAL at 11:13

## 2022-07-14 RX ADMIN — ACETAMINOPHEN 650 MG: 325 TABLET ORAL at 14:12

## 2022-07-14 RX ADMIN — GABAPENTIN 300 MG: 300 CAPSULE ORAL at 20:30

## 2022-07-14 RX ADMIN — PHENYLEPHRINE HYDROCHLORIDE 100 MCG: 10 INJECTION INTRAVENOUS at 13:09

## 2022-07-14 RX ADMIN — LIDOCAINE HYDROCHLORIDE 100 MG: 20 INJECTION, SOLUTION INFILTRATION; PERINEURAL at 11:26

## 2022-07-14 RX ADMIN — INSULIN LISPRO 6 UNITS: 100 INJECTION, SOLUTION INTRAVENOUS; SUBCUTANEOUS at 17:33

## 2022-07-14 RX ADMIN — PHENYLEPHRINE HYDROCHLORIDE 100 MCG: 10 INJECTION INTRAVENOUS at 11:35

## 2022-07-14 RX ADMIN — PROPOFOL 200 MG: 10 INJECTION, EMULSION INTRAVENOUS at 11:26

## 2022-07-14 RX ADMIN — GLYCOPYRROLATE 0.2 MG: 0.2 INJECTION, SOLUTION INTRAMUSCULAR; INTRAVENOUS at 12:44

## 2022-07-14 RX ADMIN — FENTANYL CITRATE 50 MCG: 50 INJECTION, SOLUTION INTRAMUSCULAR; INTRAVENOUS at 11:49

## 2022-07-14 RX ADMIN — PHENYLEPHRINE HYDROCHLORIDE 100 MCG: 10 INJECTION INTRAVENOUS at 11:24

## 2022-07-14 RX ADMIN — FENTANYL CITRATE 50 MCG: 50 INJECTION, SOLUTION INTRAMUSCULAR; INTRAVENOUS at 13:56

## 2022-07-14 RX ADMIN — FENTANYL CITRATE 50 MCG: 50 INJECTION, SOLUTION INTRAMUSCULAR; INTRAVENOUS at 10:56

## 2022-07-14 RX ADMIN — ATORVASTATIN CALCIUM 40 MG: 40 TABLET, FILM COATED ORAL at 20:30

## 2022-07-14 RX ADMIN — Medication 10 ML: at 20:49

## 2022-07-14 RX ADMIN — KETAMINE HYDROCHLORIDE 20 MG: 10 INJECTION, SOLUTION INTRAMUSCULAR; INTRAVENOUS at 12:11

## 2022-07-14 RX ADMIN — OXYCODONE 10 MG: 5 TABLET ORAL at 15:30

## 2022-07-14 RX ADMIN — FENTANYL CITRATE 50 MCG: 50 INJECTION, SOLUTION INTRAMUSCULAR; INTRAVENOUS at 13:51

## 2022-07-14 RX ADMIN — PHENYLEPHRINE HYDROCHLORIDE 100 MCG: 10 INJECTION INTRAVENOUS at 12:01

## 2022-07-14 RX ADMIN — Medication 3000 MG: at 11:57

## 2022-07-14 RX ADMIN — KETAMINE HYDROCHLORIDE 20 MG: 10 INJECTION, SOLUTION INTRAMUSCULAR; INTRAVENOUS at 11:30

## 2022-07-14 RX ADMIN — GLYCOPYRROLATE 0.2 MG: 0.2 INJECTION, SOLUTION INTRAMUSCULAR; INTRAVENOUS at 11:24

## 2022-07-14 RX ADMIN — FENTANYL CITRATE 25 MCG: 50 INJECTION, SOLUTION INTRAMUSCULAR; INTRAVENOUS at 12:10

## 2022-07-14 RX ADMIN — TRANEXAMIC ACID 1000 MG: 1 INJECTION, SOLUTION INTRAVENOUS at 12:45

## 2022-07-14 RX ADMIN — DEXAMETHASONE SODIUM PHOSPHATE 4 MG: 4 INJECTION, SOLUTION INTRAMUSCULAR; INTRAVENOUS at 11:33

## 2022-07-14 RX ADMIN — PHENYLEPHRINE HYDROCHLORIDE 100 MCG: 10 INJECTION INTRAVENOUS at 11:31

## 2022-07-14 RX ADMIN — CEFAZOLIN 3000 MG: 10 INJECTION, POWDER, FOR SOLUTION INTRAVENOUS; PARENTERAL at 20:47

## 2022-07-14 RX ADMIN — SUGAMMADEX 200 MG: 100 INJECTION, SOLUTION INTRAVENOUS at 13:14

## 2022-07-14 RX ADMIN — ACETAMINOPHEN 1000 MG: 500 TABLET ORAL at 20:31

## 2022-07-14 RX ADMIN — ASPIRIN 81 MG: 81 TABLET, COATED ORAL at 15:31

## 2022-07-14 RX ADMIN — FAMOTIDINE 20 MG: 10 INJECTION INTRAVENOUS at 11:14

## 2022-07-14 RX ADMIN — TRANEXAMIC ACID 1000 MG: 1 INJECTION, SOLUTION INTRAVENOUS at 11:31

## 2022-07-14 RX ADMIN — FENTANYL CITRATE 25 MCG: 50 INJECTION, SOLUTION INTRAMUSCULAR; INTRAVENOUS at 11:26

## 2022-07-14 RX ADMIN — MAGNESIUM SULFATE HEPTAHYDRATE 1 G: 500 INJECTION, SOLUTION INTRAMUSCULAR; INTRAVENOUS at 11:23

## 2022-07-14 RX ADMIN — ALOGLIPTIN 12.5 MG: 12.5 TABLET, FILM COATED ORAL at 17:31

## 2022-07-14 RX ADMIN — SODIUM CHLORIDE: 9 INJECTION, SOLUTION INTRAVENOUS at 10:58

## 2022-07-14 RX ADMIN — PHENYLEPHRINE HYDROCHLORIDE 100 MCG: 10 INJECTION INTRAVENOUS at 13:20

## 2022-07-14 RX ADMIN — GABAPENTIN 300 MG: 300 CAPSULE ORAL at 15:29

## 2022-07-14 RX ADMIN — ONDANSETRON 4 MG: 2 INJECTION INTRAMUSCULAR; INTRAVENOUS at 11:33

## 2022-07-14 RX ADMIN — ROPIVACAINE HYDROCHLORIDE 20 ML: 5 INJECTION, SOLUTION EPIDURAL; INFILTRATION; PERINEURAL at 11:13

## 2022-07-14 RX ADMIN — INSULIN GLARGINE 35 UNITS: 100 INJECTION, SOLUTION SUBCUTANEOUS at 20:35

## 2022-07-14 RX ADMIN — ROCURONIUM BROMIDE 10 MG: 10 INJECTION, SOLUTION INTRAVENOUS at 11:25

## 2022-07-14 RX ADMIN — KETAMINE HYDROCHLORIDE 10 MG: 10 INJECTION, SOLUTION INTRAMUSCULAR; INTRAVENOUS at 11:50

## 2022-07-14 RX ADMIN — LOSARTAN POTASSIUM 50 MG: 25 TABLET, FILM COATED ORAL at 15:30

## 2022-07-14 RX ADMIN — TORSEMIDE 10 MG: 20 TABLET ORAL at 15:30

## 2022-07-14 RX ADMIN — SUCCINYLCHOLINE CHLORIDE 200 MG: 20 INJECTION, SOLUTION INTRAMUSCULAR; INTRAVENOUS at 11:27

## 2022-07-14 ASSESSMENT — PAIN SCALES - GENERAL
PAINLEVEL_OUTOF10: 8
PAINLEVEL_OUTOF10: 2
PAINLEVEL_OUTOF10: 7
PAINLEVEL_OUTOF10: 4
PAINLEVEL_OUTOF10: 9

## 2022-07-14 ASSESSMENT — PAIN DESCRIPTION - PAIN TYPE
TYPE: SURGICAL PAIN

## 2022-07-14 ASSESSMENT — PAIN DESCRIPTION - LOCATION
LOCATION: LEG
LOCATION: KNEE
LOCATION: LEG
LOCATION: KNEE
LOCATION: KNEE

## 2022-07-14 ASSESSMENT — PAIN DESCRIPTION - ORIENTATION
ORIENTATION: LEFT

## 2022-07-14 ASSESSMENT — PAIN DESCRIPTION - DESCRIPTORS
DESCRIPTORS: ACHING
DESCRIPTORS: ACHING;DULL
DESCRIPTORS: ACHING;DULL
DESCRIPTORS: ACHING
DESCRIPTORS: ACHING

## 2022-07-14 NOTE — PROGRESS NOTES
[facility] [address]   Phone: [phone]    Physical Therapy    [x] Initial Evaluation            [] Daily Treatment Note         [] Discharge Summary      Patient: Mulugeta Leung   : 1940   MRN: [id number]   Date of Service:  2022  Admitting Diagnosis: Failed total knee, left, subsequent encounter  Current Admission Summary: The pt presented for a L TKR. Past Medical History:  has a past medical history of Arthritis, Atrial fibrillation ([facility] [address] CAD (coronary artery disease), Diabetes mellitus ([facility] [address] Hyperlipidemia, Hypertension, and Thyroid disease. Past Surgical History:  has a past surgical history that includes Cardiac surgery; Carotid endarterectomy; sinus surgery; joint replacement; back surgery; Foot surgery; and Revision total knee arthroplasty (Left, 2022). Discharge Recommendations: Mulugeta Leung scored a 15/24 on the AM-PAC short mobility form. Current research shows that an AM-PAC score of 18 or greater is typically associated with a discharge to the patient's home setting. Anticipate the pt to improve tomorrow with PT/OT sessions. Based on the patient's AM-PAC score and their current functional mobility deficits, it is recommended that the patient have 2-3 sessions per week of Physical Therapy at d/c to increase the patient's independence. At this time, this patient demonstrates the endurance and safety to discharge home with home services and a follow up treatment frequency of 2-3x/wk. Please see assessment section for further patient specific details. If patient discharges prior to next session this note will serve as a discharge summary. Please see below for the latest assessment towards goals.     HOME HEALTH CARE: LEVEL 3 SAFETY     - Initial home health evaluation to occur within 24-48 hours, in patient home   - Therapy evaluations in home within 24-48 hours of discharge; including DME and home safety   - Frontload therapy 5 care, weight-bearing education, proper use of assistive device/equipment, discharge recommendations, pt wished to discuss his HEP tomorrow   Learning Assessment:  patient verbalizes understanding, would benefit from continued reinforcement    Assessment  Activity Tolerance: O2 sat 92% on room air, pt fatigued with standing, pt appeared to be tired with eyes partially closed   Impairments Requiring Therapeutic Intervention: decreased functional mobility, decreased ROM, decreased strength, decreased endurance, decreased balance, increased pain  Prognosis: good  Clinical Assessment: The pt presented s/p L TKR with decreased balance, activity tolerance and L knee ROM. He fatigued with ambulating 20' and standing for 3 minutes, then requiring a seated rest break. The pt is not ready to DC home yet but hopes to improve with PT/OT tomorrow. Safety Interventions: patient left in chair, chair alarm in place, call light within reach and nurse notified    Plan  Frequency: 7 x/week BID tx  Current Treatment Recommendations: strengthening, ROM, balance training, functional mobility training, transfer training, gait training, stair training, endurance training, neuromuscular re-education and safety education    Goals  Patient Goals: return home with wife, wife is having a knee surgery in August    Short Term Goals:  Time Frame:  To be met prior to DC  Patient will complete bed mobility at modified independent   Patient will complete transfers at modified independent   Patient will ambulate 75 ft with use of rolling walker at supervision  Patient will ascend/descend 1 stairs with RW at contact guard assistance  Patient will complete car transfer at contact guard assistance    Therapy Session Time      Individual Group Co-treatment   Time In     1518   Time Out     1600   Minutes     42     Timed Code Treatment Minutes:  27 Minutes  Total Treatment Minutes:  42       Electronically Signed By: Marcello Zaldivar PT DPT [id number]

## 2022-07-14 NOTE — PROGRESS NOTES
Pt meets d/c criteria for phase 1 in PACU and has been seen by anesthesia. Ok to transfer to [facility] for med-surg care. Will alert anyone in waiting room for them and the nursing unit if applicable. Will continue to monitor for safety and comfort.     Yash Donaldson BSN, RN  Pre-Op/Recovery   Same Day Surgery

## 2022-07-14 NOTE — PROGRESS NOTES
Pt arrived to PACU from OR in stable condition and is alert to verbal stimuli. Pt can move extremities to command. Respirations are even on 6L O2 per SM. Skin warm, dry, and with appropriate for ethnicity color. Abd is soft. Pain is tolerable at this time.   Left knee surgical site(s) intact with dressing= Dermabond Prineo, Therabond, web roll, ACE wrap; CDI      S/P: revision left total knee arthroplasty with Dr. Marek Liz at [facility], RN  Pre-Op/Recovery   Same Day Surgery

## 2022-07-14 NOTE — DISCHARGE INSTRUCTIONS
[facility] DISCHARGE ORDER SET CAYLA    1. (x   )  Activity instructions for ECF/HOME  Elevate extremity if swelling occurs  ICE to operative site   20 minutes/hour while awake  apply towel over dressing when icing  Continue the exercise program as prescribed by PT/OT   Use walker, crutches or cane with weightbearing instructions as indicated by MD  Do not ambulate without assistance until cleared by PT  Out of bed every hour while awake, ambulating minimum 10 minutes  Ankle pumps bilateral ankles 15x every hour while awake  IS Spirometer every 2 hrs while awake  Drink minimum  Eight  8 oz glasses of water daily       2. ( x  ) Initiate bowel care with the following medications  Colace 100 mg 1 tab by mouth twice daily, continue while on narcotics, hold for loose stools. Call office if you have any difficulties with bowel movements/urinating after surgery        3. ( x  ) Admit s/p    ( X  ) left    (  X ) REVISION TKA        4.  (x) START OUTPATIENT PHYSICAL THERAPY ON MONDAY  CALL OFFICE TO   SCHEDULE [phone]        (  x  ) PT    (   ) OT    Rom, strengthening ex, ADL's       ( X  ) HOME PT 2-3 X A WEEK VS       (X) outpatient PT 2-3 x a week for 4 weeks       (   ) PT daily    5. (x   )  Weight bearing/limitations    (X   ) left   ( X  ) lower   extremity     (  X ) FWB      (X ) CPM  Start 0-60 degrees,day after surgery, increase 10 degrees daily or as tolerated, use minimum     6 hrs/day   Dc CPM when AROM > 120 degrees    6.  (   )  Labs  Pt/INR  on Monday and Thursdays for 4 weeks, call results to   [phone]  before 3 pm for coumadin dose    7.( x  ) Dressing/wound care  Ok to shower on Sunday  Knee replacement: Remove ace wrap/gauze on post op day 3  Leave Clear Therabond dressing on until first post op appt with MD  If you had knee replacement cover  knee with saran wrap when showering, remove saran wrap after showering   Avoid direct water contact to post op dressing when showering  NO BATHS Pat dressing dry with soft towel after showering. 8. (  x ) DVT PROPHYLAXIS  ( x  ) Thigh/knee hi coco hose bilateral lower extremities, OFF AT NIGHT  (  x ) RESTART ELIQUIS EVNING OF SURGERY          9.  Continue these meds if you were started on them prior to surgery      Celebrex 200 mg  Every 12 hours      Neurontin 300 mg daily      Cymbalta 30 mg daily intitially then as instructed in information packet given to      you  from the office         Call office for refills on above medications if you run out           Call office with any questions  [phone]  Follow up appt  with ortho in 2 weeks  ELECTRONICALLY SIGNED BY: Martínez Chavez MD, 7/13/2022

## 2022-07-14 NOTE — ANESTHESIA PRE PROCEDURE
Department of Anesthesiology  Preprocedure Note       Name:  Ene Ruiz   Age:  79 y.o.  :  1940                                          MRN:  [id number]         Date:  2022      Surgeon: Jessie Mohs):  Avelino Villa MD    Procedure: Procedure(s):  REVISION LEFT TOTAL KNEE ARTHROPLASTY-CHERRY; ADDUCTOR BLOCK    Medications prior to admission:   Prior to Admission medications    Medication Sig Start Date End Date Taking? Authorizing Provider   apixaban (ELIQUIS) 5 MG TABS tablet Take by mouth 2 times daily   Yes Historical Provider, MD   losartan (COZAAR) 50 mg tablet Take 50 mg by mouth daily   Yes Historical Provider, MD   amLODIPine (NORVASC) 2.5 MG tablet Take 2.5 mg by mouth at bedtime   Yes Historical Provider, MD   levothyroxine (SYNTHROID) 125 MCG tablet Take 125 mcg by mouth Daily   Yes Historical Provider, MD   atorvastatin (LIPITOR) 40 MG tablet Take 40 mg by mouth at bedtime   Yes Historical Provider, MD   gabapentin (NEURONTIN) 300 MG capsule Take 300 mg by mouth 4 times daily.    Yes Historical Provider, MD   torsemide (DEMADEX) 10 MG tablet Take 10 mg by mouth daily   Yes Historical Provider, MD   omeprazole (PRILOSEC) 20 MG delayed release capsule Take 20 mg by mouth daily   Yes Historical Provider, MD   SITagliptin (JANUVIA) 100 MG tablet Take 100 mg by mouth daily   Yes Historical Provider, MD   insulin glargine (LANTUS) 100 UNIT/ML injection vial Inject into the skin nightly Depends on blood sugar    35-40units   Yes Historical Provider, MD   insulin lispro (INSULIN LISPRO) 100 UNIT/ML SOLN injection vial Inject into the skin 3 times daily (with meals) 10units-12units   Yes Historical Provider, MD   Calcium Polycarbophil (FIBER-CAPS PO) Take by mouth   Yes Historical Provider, MD   Fluticasone Propionate (FLONASE NA) by Nasal route   Yes Historical Provider, MD   Probiotic Product (PROBIOTIC-10 PO) Take by mouth   Yes Historical Provider, MD   aspirin 81 MG EC tablet Take 81 mg by mouth daily   Yes Historical Provider, MD       Current medications:    Current Facility-Administered Medications   Medication Dose Route Frequency Provider Last Rate Last Admin    0.9 % sodium chloride infusion   IntraVENous Continuous Cesilia Su MD        lidocaine PF 1 % injection 0.5 mL  0.5 mL IntraDERmal Once Cesilia Su MD        ceFAZolin (ANCEF) 3000 mg in dextrose 5 % 100 mL IVPB  3,000 mg IntraVENous On Call to Elda Romero MD        ortho mix [facility]) injection   Injection On Call Cesilia Su MD        tranexamic acid (CYKLOKAPRON) 1,000 mg in sodium chloride 0.9 % 60 mL IVPB  1,000 mg IntraVENous Once Cesilia Su MD        tranexamic acid (CYKLOKAPRON) 1,000 mg in sodium chloride 0.9 % 60 mL IVPB  1,000 mg IntraVENous On Call to Elda Romero MD           Allergies:  No Known Allergies    Problem List:  There is no problem list on file for this patient.       Past Medical History:        Diagnosis Date    Arthritis     Atrial fibrillation ([facility] [address] [facility]     CAD (coronary artery disease)     Diabetes mellitus ([facility] [address]     Hyperlipidemia     Hypertension     Thyroid disease        Past Surgical History:        Procedure Laterality Date    BACK SURGERY      cervical   lumbar    CARDIAC SURGERY      bypassx4    CAROTID ENDARTERECTOMY      FOOT SURGERY      left    JOINT REPLACEMENT      SINUS SURGERY         Social History:    Social History     Tobacco Use    Smoking status: Never Smoker    Smokeless tobacco: Never Used   Substance Use Topics    Alcohol use: Yes     Comment: soc                                Counseling given: Not Answered      Vital Signs (Current):   Vitals:    06/09/22 1113   Weight: 270 lb (122.5 kg)   Height: 6' (1.829 m)                                              BP Readings from Last 3 Encounters:   No data found for BP       NPO Status: BMI:   Wt Readings from Last 3 Encounters:   06/09/22 270 lb (122.5 kg)     Body mass index is 36.62 kg/m². CBC:   Lab Results   Component Value Date/Time    WBC 6.3 06/22/2022 11:15 AM    RBC 4.39 06/22/2022 11:15 AM    HGB 14.8 06/22/2022 11:15 AM    HCT 43.0 06/22/2022 11:15 AM    MCV 98.1 06/22/2022 11:15 AM    RDW 13.3 06/22/2022 11:15 AM     06/22/2022 11:15 AM       CMP:   Lab Results   Component Value Date/Time     06/22/2022 11:15 AM    K 4.1 06/22/2022 11:15 AM     06/22/2022 11:15 AM    CO2 23 06/22/2022 11:15 AM    BUN 36 06/22/2022 11:15 AM    CREATININE 1.5 06/22/2022 11:15 AM    GFRAA 54 06/22/2022 11:15 AM    AGRATIO 1.5 06/22/2022 11:15 AM    LABGLOM 45 06/22/2022 11:15 AM    GLUCOSE 42 06/22/2022 11:15 AM    PROT 7.2 06/22/2022 11:15 AM    CALCIUM 9.1 06/22/2022 11:15 AM    BILITOT 0.6 06/22/2022 11:15 AM    ALKPHOS 80 06/22/2022 11:15 AM    AST 25 06/22/2022 11:15 AM    ALT 17 06/22/2022 11:15 AM       POC Tests: No results for input(s): POCGLU, POCNA, POCK, POCCL, POCBUN, POCHEMO, POCHCT in the last 72 hours.     Coags:   Lab Results   Component Value Date/Time    PROTIME 18.1 06/22/2022 11:15 AM    INR 1.50 06/22/2022 11:15 AM    APTT 36.6 06/22/2022 11:15 AM       HCG (If Applicable): No results found for: PREGTESTUR, PREGSERUM, HCG, HCGQUANT     ABGs: No results found for: PHART, PO2ART, FOV9ZZI, OOX2QMZ, BEART, X7AOAFEM     Type & Screen (If Applicable):  No results found for: LABABO, LABRH    Drug/Infectious Status (If Applicable):  No results found for: HIV, HEPCAB    COVID-19 Screening (If Applicable): No results found for: COVID19        Anesthesia Evaluation  Patient summary reviewed no history of anesthetic complications:   Airway: Mallampati: II  TM distance: >3 FB   Neck ROM: limited  Mouth opening: < 3 FB   Dental: normal exam         Pulmonary:   (+) COPD:  sleep apnea:                             Cardiovascular:    (+) hypertension:, CAD:, CABG/stent (1995):, dysrhythmias: atrial fibrillation,                   Neuro/Psych:   Negative Neuro/Psych ROS              GI/Hepatic/Renal: Neg GI/Hepatic/Renal ROS            Endo/Other:    (+) DiabetesType II DM, well controlled, using insulin, . Abdominal:   (+) obese,           Vascular: negative vascular ROS. Other Findings:           Anesthesia Plan      regional and general     ASA 3     (Will plan for adductor/ipack + General--LMA OK--small mouth opening.)      MIPS: Postoperative opioids intended. Anesthetic plan and risks discussed with patient. Plan discussed with CRNA.                     Maury Bailey MD   7/14/2022

## 2022-07-14 NOTE — PROGRESS NOTES
Pt to room 4458 from PACU. Able to transfer to bed with x 1 assist. SCD sleeves applied and started pump. IVF infusing. Pt has tagederm to right forearm placed for skin tear by anesthesia. Left knee with ace wrap. Pt reports full sensation in JOSUE LE, warm to touch and can moves toes on command JOSUE. Pt is axox4 and able to answer questions and follow commands throughout assessment. C/o 7/10 pain. Able to answer admission questions. Pt is axox4 and able to answer questions and follow commands throughout assessment.

## 2022-07-14 NOTE — OP NOTE
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]  Breakdown (mechanical) of int fix of bone of l low leg, subs [T84.117D]    Post-Op Diagnosis: Same       Procedure(s):  REVISION LEFT TOTAL KNEE ARTHROPLASTY-CHERRY; ADDUCTOR BLOCK    Surgeon(s):  Davis Apgar, MD    Assistant:   Surgical Assistant: Bonnie Rm  Physician Assistant: Yaokv Sorto [facility] Amy Subramanian    Anesthesia: General    Estimated Blood Loss (mL): Minimal    Complications: None    Specimens:   ID Type Source Tests Collected by Time Destination   A :  Body Fluid Fluid SURGICAL PATHOLOGY, CULTURE, ANAEROBIC, CULTURE, BODY FLUID Davis Apgar, MD 2022 1157        Implants:  Implant Name Type Inv. Item Serial No.  Lot No. LRB No. Used Action   CEMENT BNE 40GM HI VISC RADPQ FOR REV SURG - [id number]  CEMENT BNE 40GM HI VISC RADPQ FOR REV SURG  [employer]WD [id number] Left 1 Implanted   CEMENT BNE 40GM HI VISC RADPQ FOR REV SURG - [id number]  CEMENT BNE 40GM HI VISC RADPQ FOR REV SURG  [employer]WD [id number] Left 1 Implanted   CEMENT BNE 40GM HI VISC RADPQ FOR REV SURG - [id number]  CEMENT BNE 40GM HI VISC RADPQ FOR REV SURG  [employer]- [id number] Left 1 Implanted   [id number] CEHRRY PERSONA REVISION VIVACIT E HIGHLY CROSSLINKED POLY FIX BEARING LEFT 10MM      [id number] Left 1 Implanted   [id number] CHERRY PERSON[employer] REVISION STEM EXT 20MM 135MM      [id number] Left 1 Implanted         Drains: * No LDAs found *    Findings: Failede left total knee replacement    Detailed Description of Procedure:   Operative Report    Patient: Dorian Kruger  YOB: 1940  Age: 79 y.o.   Sex: male  MRN: [id number]    DATE OF OPERATION : 2022    SURGEON: Davis Apgar MD    ASSISTANT:  Diamond Olson is a board certified physician assistant who is medically necessary as a first assistant in the operating room with me. He is responsible for assisting with positioning of the patient,retraction,cauterization and manipulation of the involved extremity to allow for improved visualization throughout the surgical procedure facilitating optimal placement of the implants providing a stable, functional joint replacement. His presence in the operating room with me as a first assistant during joint replacement procedures enables me to perform the surgical procedure in a more timely and efficient manner. [facility] does not provide me with a first assistant in the operating room who has the same surgical skills as my physician assistant. PREOPERATIVE DIAGNOSIS: FailedLeft  TKA. POSTOPERATIVE DIAGNOSIS: Failed Left  TKA. PROCEDURE PERFORMED: Conversion to a revision Left  total knee arthroplasty. COMPLICATIONS: None. CONSULTATIONS: None. ANESTHESIA: Spinal anesthetic with local infiltration. Pre op Antibiotics: Ancef 2 gms IV    EBL: less than 50ml      ASSISTANT: ANGELITA Noland      INDICATIONS FOR SURGERY:  Kristen Flores, is a 79 y.o. male patient with previous TKA 15+ years ago. He has been having increased pain, swelling and instability. Risks and benefits of revision with removal of the  cement spacers, application of the stemmed components were carefully explained in great detail, and the patient elected to  proceed with surgery. OPERATIVE FINDINGS: failed left total knee replacement      The standard midline incision was made. The subcutaneous tissue was sharply  incised. A medial parapatellar approach was performed. The knee was  extended. Patella was everted. The previous implants were removed. All soft tissue was debrided around the joint. I started with the tibia,  with sequential drilling into the tibia. A  15 mm  reamer gave good fit. Proximal tibial cut was made.

## 2022-07-14 NOTE — ANESTHESIA PROCEDURE NOTES
Peripheral Block    Patient location during procedure: holding area  Reason for block: post-op pain management and at surgeon's request  Staffing  Performed: anesthesiologist   Anesthesiologist: Tyler Burr MD  Preanesthetic Checklist  Completed: patient identified, IV checked, site marked, risks and benefits discussed, surgical/procedural consents, equipment checked, pre-op evaluation, timeout performed, anesthesia consent given, oxygen available and monitors applied/VS acknowledged  Peripheral Block   Patient position: supine  Prep: ChloraPrep  Provider prep: mask and sterile gloves  Patient monitoring: cardiac monitor, continuous pulse ox, frequent blood pressure checks and IV access  Block type: iPacks  Laterality: left  Injection technique: single-shot  Guidance: ultrasound guided    Needle   Needle type: combined needle/nerve stimulator   Needle gauge: 22 G  Needle localization: anatomical landmarks and ultrasound guidance  Needle length: 10 cm  Assessment   Injection assessment: negative aspiration for heme, no paresthesia on injection and local visualized surrounding nerve on ultrasound  Paresthesia pain: none  Slow fractionated injection: yes  Hemodynamics: stable  Real-time US image taken/store: yes    Medications Administered  Ropivacaine (NAROPIN) injection 0.5%, 30 mL

## 2022-07-14 NOTE — CONSULTS
Consult -Internal Medicine  Dr. Jeronimo Manley  7/14/2022      PCP: Agustin Borrego  Referring Physician: Ele Ojeda, *    Code Status: Full Code  Current Diet: ADULT DIET; Regular; 4 carb choices (60 gm/meal)      Cc: Constance Franks is a79 y.o. male who presents with Failed total knee, left, subsequent encounter. 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 Pt presents with with above complaint. Pt has has severe pain to the L knee  Duration - going on for at least 10 month(s)  It is described as a persistent pain that is aching, throbbing in quality. Severity rated as 9 out of 10 at its worst  Pain is so severe that it limits usual activities  No improvement with medications, therapy or injections  As it is not improved with conservative measures, surgery has been recommended    Pt has undergone revision L TKA without any apparent complications. Pt is doing well post operatively. Pain is controlled at this time. I have been asked to see the patient for evaluation of his internal medicine issues:  has a past medical history of Arthritis, Atrial fibrillation ([address] CAD (coronary artery disease), Diabetes mellitus ([address] Hyperlipidemia, Hypertension, and Thyroid disease. Sherryle Moder Home meds have been reveiwed and restartedas indicated. Pt denies having other complaints at this time.     Pt has been evaluated post operatively  Pain does appear to be controlled - on iv meds  Patient has not worked with therapy at this time      Electronic chart reviewed  Home meds reviewed and restarted as indicated  Op note, anesthesia flowsheet reviewed  Case d/w nursing    Doing well, seen in recovery  No issues noted  Has not yet worked with therapy at time of my eval  Pain appears controlled      Review of Systems: (1 system for EPF, 2-9 for detailed, 10+ for comprehensive)  Constitutional: Negative for chills and fever. HENT: Negative for ear pain and mouth sores. Eyes: Negative for pain, redness and visual disturbance. Respiratory: Negative for cough, shortness of breath and wheezing. Cardiovascular: Negative for chest pain and leg swelling. Gastrointestinal: Negative for diarrhea, nausea and vomiting. Endocrine: Negative for polydipsia and polyphagia. Genitourinary: Negative for frequency and urgency. Musculoskeletal: Negative for back pain and neck pain.  +knee pain  Skin: Negative for color change. Allergic/Immunologic: Negative for food allergies. Neurological: Negative for seizures and syncope. Hematological: Does not bruise/bleed easily. Psychiatric/Behavioral: Negative for confusion. The patient is not nervous/anxious.              Past Medical History:   Past Medical History:   Diagnosis Date    Arthritis     Atrial fibrillation ([facility]     CAD (coronary artery disease)     Diabetes mellitus ([facility]     Hyperlipidemia     Hypertension     Thyroid disease        Past Surgical History:   Past Surgical History:   Procedure Laterality Date    BACK SURGERY      cervical   lumbar    CARDIAC SURGERY      bypassx4    CAROTID ENDARTERECTOMY      FOOT SURGERY      left    JOINT REPLACEMENT      REVISION TOTAL KNEE ARTHROPLASTY Left 7/14/2022    REVISION LEFT TOTAL KNEE ARTHROPLASTY-CHERRY; ADDUCTOR BLOCK performed by Rossy Kruse MD at [address] History:   Social History       Tobacco History       Smoking Status  Never Smoker Smokeless Tobacco Use  Never Used              Alcohol History       Alcohol Use Status  Yes Comment  soc              Drug Use       Drug Use Status  Never              Sexual Activity       Sexually Active  Not Asked                    Fam History: History reviewed. No pertinent family history. PFSH: The above PMHx, PSHx, SocHx, FamHx has been reviewed by myself. (1 area for detailed, 2-3 for comprehensive)      Code Status: Full Code    Meds - following list ofhome medications from electronic chart has been reviewed by myself  Prior to Admission medications    Medication Sig Start Date End Date Taking? Authorizing Provider   oxyCODONE (ROXICODONE) 5 MG immediate release tablet Take 1 tablet by mouth every 6 hours as needed for Pain for up to 7 days. Intended supply: 7 days. Take lowest dose possible to manage pain 7/14/22 7/21/22 Yes [address] [place], [place]   acetaminophen (TYLENOL) 500 MG tablet Take 2 tablets by mouth 3 times daily 7/14/22  Yes [address] [place], [place]   docusate sodium (COLACE) 100 MG capsule Take 1 capsule by mouth 2 times daily 7/14/22 8/13/22 Yes Matthew Daley [place]   cefadroxil (DURICEF) 500 MG capsule Take 1 capsule by mouth 2 times daily for 7 days 7/14/22 7/21/22 Yes [facility]   apixaban (ELIQUIS) 5 MG TABS tablet Take by mouth 2 times daily   Yes Historical Provider, MD   losartan (COZAAR) 50 mg tablet Take 50 mg by mouth daily   Yes Historical Provider, MD   amLODIPine (NORVASC) 2.5 MG tablet Take 2.5 mg by mouth at bedtime   Yes Historical Provider, MD   levothyroxine (SYNTHROID) 125 MCG tablet Take 125 mcg by mouth Daily   Yes Historical Provider, MD   atorvastatin (LIPITOR) 40 MG tablet Take 40 mg by mouth at bedtime   Yes Historical Provider, MD   gabapentin (NEURONTIN) 300 MG capsule Take 300 mg by mouth 4 times daily.    Yes Historical Provider, MD   torsemide (DEMADEX) 10 MG tablet Take 10 mg by mouth daily   Yes Historical Provider, MD   omeprazole (PRILOSEC) 20 MG delayed release capsule Take 20 mg by mouth daily   Yes Historical Provider, MD   SITagliptin (JANUVIA) 100 MG tablet Take 100 mg by mouth daily   Yes Historical Provider, MD   insulin glargine (LANTUS) 100 UNIT/ML injection vial Inject into the skin nightly Depends on blood sugar    35-40units   Yes Historical Provider, MD   insulin lispro (INSULIN LISPRO) 100 UNIT/ML SOLN injection vial Inject into the skin 3 times daily (with meals) 10units-12units   Yes Historical Provider, MD   Calcium Polycarbophil (FIBER-CAPS PO) Take by mouth   Yes Historical Provider, MD   Fluticasone Propionate (FLONASE NA) by Nasal route   Yes Historical Provider, MD   Probiotic Product (PROBIOTIC-10 PO) Take by mouth   Yes Historical Provider, MD   aspirin 81 MG EC tablet Take 81 mg by mouth daily   Yes Historical Provider, MD         No Known Allergies          EXAM: (2-7 system forEPF/Detailed, ?8 for Comprehensive)  /65   Pulse 65   Temp 98 °F (36.7 °C) (Oral)   Resp 16   Ht 6' (1.829 m)   Wt 265 lb (120.2 kg)   SpO2 97%   BMI 35.94 kg/m²   Constitutional: vitals as above: alert, appears stated age and cooperative    Psychiatric: normal insight and judgment, oriented to person, place, time, and general circumstances    Head: Normocephalic, without obvious abnormality, atraumatic    Eyes:lids and lashes normal, conjunctivae and sclerae normal and pupils equal, round, reactive to light and accomodation    EMNT: external ears normal, nares midline    Neck: no carotid bruit, supple, symmetrical, trachea midline and thyroid not enlarged, symmetric, no tenderness/mass/nodules     Respiratory: clear to auscultation and percussion bilaterally with normal respiratory effort    Cardiovascular: normal rate, regular rhythm, normal S1 and S2 and no murmurs    Gastrointestinal: soft, non-tender, non-distended, normal bowel sounds, no masses or organomegaly    Extremities: no clubbing, no edema - dressing  L Knee CDI Skin:No rashes or nodules noted. Neurologic:negative           LABS:  Labs Reviewed   PROTIME-INR - Abnormal; Notable for the following components:       Result Value    Protime 14.7 (*)     INR 1.16 (*)     All other components within normal limits   POCT GLUCOSE - Abnormal; Notable for the following components:    POC Glucose 237 (*)     All other components within normal limits   POCT GLUCOSE - Abnormal; Notable for the following components:    POC Glucose 238 (*)     All other components within normal limits   POCT GLUCOSE - Abnormal; Notable for the following components:    POC Glucose 276 (*)     All other components within normal limits   CULTURE, SURGICAL   APTT   CELL COUNT WITH DIFFERENTIAL, BODY FLUID    Narrative:     Left knee   BASIC METABOLIC PANEL   POCT GLUCOSE   TYPE AND SCREEN         IMAGING:  Imaging has been reviewed in the computerized chart  No results found. EKG: reviewed if available      Lab Results   Component Value Date/Time    GLUCOSE 42 06/22/2022 11:15 AM     Lab Results   Component Value Date/Time    POCGLU 276 07/14/2022 04:16 PM     /65   Pulse 65   Temp 98 °F (36.7 °C) (Oral)   Resp 16   Ht 6' (1.829 m)   Wt 265 lb (120.2 kg)   SpO2 97%   BMI 35.94 kg/m²     MEDICAL DECISION MAKING:    Principal Problem:    Failed total knee, left, subsequent encounter - New Problem to me. Doing well post op. Pain controlled. Post op films reviewed by self, shows TKA in good alignment  Plan: Continue on post-operative pathway. PT/OT to see patient. Continue pain control - on IV pain meds acutely post op. Work on transitioning to oral pain meds when possible. Will follow serial h/h to monitor for acute blood loss anemia - labs ordered for tomorrow. Active Problems:    HTN (hypertension) -Established problem. Stable. 129/65  Plan: Pt home BP meds reviewed and will be continued. IV Hydralazine ordered for control of extremely high blood pressures.    Will monitor labs to assess Creat/K for possible complications of medications. Hypothyroid -Established problem. Stable. Plan: cont on home meds     DM2 (diabetes mellitus, type 2) ([facility] -Established problem. Stable. Glu a little high (could be 2/2 intraop steroids)  Plan: Patient placed on controlled carbohydrate diet. Fingerstick sugars to be checked to monitor for both hypoglycemia as well as hyperglycemia. Sliding scale insulin ordered. Glucagon and dextrose ordered for hypoglycemia. Patient will be continued on home medications. Hemoglobin a1c to be ordered to assess efficacy of therapy. Diagnoses as listed above, designated as new or established and plan outlined for each. Data Reviewed:   (1) Lab tests were reviewed or ordered. (1) Radiology tests were reviewed or ordered. (1) Medical test (Echo, EKG, PFT/ludy) were not ordered. (1) History was not obtained from someone other than patient  (1) Old records  were reviewed - see HPI/MDM for pertinent details if review done. (2) Case was discussed with another health care provider: Dr Trudi Howard  (2) Imaging was viewed by myself. (2) EKG  was not viewed by myself. Thanks for the consult! Will follow along daily while patient is in house.       (Please note that portions of this note were completed with a voice recognition program.  Efforts were made to edit the dictations but occasionally words are mis-transcribed.)      Washington Fuentes MD  7/14/2022

## 2022-07-14 NOTE — ANESTHESIA PROCEDURE NOTES
Peripheral Block    Patient location during procedure: holding area  Reason for block: post-op pain management and at surgeon's request  Staffing  Performed: anesthesiologist   Anesthesiologist: Kalpesh Ferguson MD  Preanesthetic Checklist  Completed: patient identified, IV checked, site marked, risks and benefits discussed, surgical/procedural consents, equipment checked, pre-op evaluation, timeout performed, anesthesia consent given, oxygen available and monitors applied/VS acknowledged  Peripheral Block   Patient position: supine  Prep: ChloraPrep  Provider prep: mask and sterile gloves  Patient monitoring: cardiac monitor, continuous pulse ox, continuous capnometry, frequent blood pressure checks and IV access  Block type: Femoral  Adductor canal  Laterality: left  Injection technique: single-shot  Guidance: ultrasound guided    Needle   Needle type: combined needle/nerve stimulator   Needle gauge: 22 G  Needle localization: anatomical landmarks and ultrasound guidance  Needle length: 10 cm  Assessment   Injection assessment: negative aspiration for heme, no paresthesia on injection and local visualized surrounding nerve on ultrasound  Paresthesia pain: none  Slow fractionated injection: yes  Hemodynamics: stable  Real-time US image taken/store: yes    Medications Administered  Ropivacaine (NAROPIN) injection 0.5%, 20 mL

## 2022-07-14 NOTE — PROGRESS NOTES
[facility]   Phone: [phone]    Occupational Therapy    [x] Initial Evaluation            [] Daily Treatment Note         [] Discharge Summary      Patient: Corrinne Echevaria   : 1940   MRN: [id number]   Date of Service:  2022    Admitting Diagnosis:  Failed total knee, left, subsequent encounter  Current Admission Summary: L TKA  Past Medical History:  has a past medical history of Arthritis, Atrial fibrillation ([address] CAD (coronary artery disease), Diabetes mellitus ([address] Hyperlipidemia, Hypertension, and Thyroid disease. Past Surgical History:  has a past surgical history that includes Cardiac surgery; Carotid endarterectomy; sinus surgery; joint replacement; back surgery; Foot surgery; and Revision total knee arthroplasty (Left, 2022). Discharge Recommendations: Corrinne Echevaria scored a 17/24 on the AM-PAC ADL Inpatient form. Current research shows that an AM-PAC score of 18 or greater is typically associated with a discharge to the patient's home setting. Based on the patient's AM-PAC score, and their current ADL deficits, it is recommended that the patient have 2-3 sessions per week of Occupational Therapy at d/c to increase the patient's independence. At this time, this patient demonstrates the endurance and safety to discharge home with home (home vs OP services) and a follow up treatment frequency of 2-3x/wk. Please see assessment section for further patient specific details. If patient discharges prior to next session this note will serve as a discharge summary. Please see below for the latest assessment towards goals.    HOME HEALTH CARE: LEVEL 1 STANDARD    - Initial home health evaluation to occur within 24-48 hours, in patient home   - Therapy to evaluate with goal of regaining prior level of functioning   - Therapy to evaluate if patient has [address] needs for personal care    Pt goes by \"Don\"      DME Required For Discharge: patient has all required DME for discharge    Precautions/Restrictions: high fall risk, weight bearing  Weight Bearing Restrictions: weight bearing as tolerated  [] Right Upper Extremity  [] Left Upper Extremity [] Right Lower Extremity  [x] Left Lower Extremity     Required Braces/Orthotics: no braces required   [] Right  [] Left  Positional Restrictions:no positional restrictions    Pre-Admission Information   Lives With: spouse    Type of Home: house  Home Layout: one level  Home Access: level entry  Bathroom Layout: walk in shower, . Comment: bathroom not accessible with RW  Toilet Height: elevated height  Bathroom Equipment: grab bars in shower, shower chair, hand held shower head  Home Equipment: rollator - 4 wheeled walker  Transfer Assistance: Independent without use of device  Ambulation Assistance:modified independent with use of 4WW d/t pain  ADL Assistance: independent with all ADL's  IADL Assistance: requires assistance with all homemaking tasks, requires assistance with meal prep, requires assistance with laundry, requires assistance with vacuuming, requires assistance with cleaning, pt empties , pt gets his own breakfast and lunch  Active :        [x] Yes  [] No  Current Employment: retired  Hobbies:   Recent Falls: pt denies falls    Examination   Vision:   Vision Corrective Device: wears glasses at all times  Hearing:   [facility]  Perception:   WFL  Observation:   General Observation:  skin tear on R arm  Posture:   good  Sensation:   WFL  Proprioception:    WFL  Tone:   Normotonic  Coordination Testing:   WFL    ROM:   (B) UE AROM WFL  Strength:   (B) UE strength grossly WFL    Decision Making: low complexity  Clinical Presentation: evolving      Subjective  General: pt on 2 L O2 upon arrival, removed to RA with saturations at 92%. Pt reporting 7/10 pain in L LE, RN notified for pain meds  Pain: 7/10.   Location: L knee  Pain Interventions: RN notified        Activities of Daily Living  Basic Activities of Daily Living  Toileting Comments: pt ambulated to bathroom, stood to urinate. unable to do so, became lightheaded and nauseated, chair brought to bathroom for pt to sit. stand step transfer from chair to recliner. pt left in recliner  Instrumental Activities of Daily Living  No IADL completed on this date. Functional Mobility  Bed Mobility  Supine to Sit: stand by assistance  Scooting: stand by assistance  Comments: HOB elevated  Transfers  Sit to stand transfer:contact guard assistance, minimal assistance  Stand to sit transfer: contact guard assistance, minimal assistance  Comments: cues for hand placement, improving after initial stand  Functional Mobility:  Sitting Balance: stand by assistance. Standing Balance: contact guard assistance. Functional Mobility: rolling walker. contact guard assistance. Activity: to/from bathroom   Increased time for ambulation    Other Therapeutic Interventions    Functional Outcomes  AM-PAC Inpatient Daily Activity Raw Score: 17    Cognition  WFL-flat affect  Orientation:    alert and oriented x 4  Command Following:   accurately follows one step commands     Education  Barriers To Learning: none  Patient Education: patient educated on goals, OT role and benefits, plan of care, precautions, ADL adaptive strategies, weight-bearing education, HEP, transfer training, discharge recommendations  Learning Assessment:  patient verbalizes understanding, would benefit from continued reinforcement    Assessment  Activity Tolerance: pt tired, SPO2 at 92% on RA. Felt better once in recliner. Pain improved with meds and ambulation  Impairments Requiring Therapeutic Intervention: decreased functional mobility, decreased ADL status, decreased ROM, decreased strength, decreased balance  Prognosis: good  Clinical Assessment: pt not at baseline, typically uses 4WW for ambulation and is independent with ADL tasks.  Pt would benefit from ongoing skilled OT services in order to maximize independence in order to return to OF   Safety Interventions: patient left in chair, chair alarm in place, call light within reach, gait belt, patient at risk for falls, nurse notified and family/caregiver present    Plan  Frequency: 7 x/week  Current Treatment Recommendations: strengthening, ROM, balance training, functional mobility training, transfer training, patient/caregiver education, ADL/self-care training and pain management    Goals  Patient Goals: pt did not state   Short Term Goals:  Time Frame: discharge  Bed mobility mod I  Functional ADL transfer mod I  Functional mobility with mod I with RW  UB ADLs mod I  LB ADLs mod I with AE as needed      Therapy Session Time     Individual Group Co-treatment   Time In    1518   Time Out    1600   Minutes    42        Timed Code Treatment Minutes:   27 minutes  Total Treatment Minutes:  42 minutes       Electronically Signed By: Bhavin Mckay, [address], OTR/L [id number]

## 2022-07-14 NOTE — ANESTHESIA POSTPROCEDURE EVALUATION
Department of Anesthesiology  Postprocedure Note    Patient: Jay Clemente  MRN: [id number]  YOB: 1940  Date of evaluation: 7/14/2022      Procedure Summary     Date: 07/14/22 Room / Location: [address]    Anesthesia Start: 4204 Anesthesia Stop: 5275    Procedure: REVISION LEFT TOTAL KNEE ARTHROPLASTY-CHERRY; ADDUCTOR BLOCK (Left Knee) Diagnosis:       Presence of left artificial knee joint      Breakdown (mechanical) of int fix of bone of l low leg, subs      (Presence of left artificial knee joint [Z96.652])      (Breakdown (mechanical) of int fix of bone of l low leg, subs [T84.117D])    Surgeons: Nuvia Loyola MD Responsible Provider: Guera sHu MD    Anesthesia Type: regional, general ASA Status: 3          Anesthesia Type: No value filed.     Amy Phase I: Amy Score: 9    Amy Phase II:        Anesthesia Post Evaluation    Patient location during evaluation: PACU  Patient participation: complete - patient participated  Level of consciousness: awake and alert  Airway patency: patent  Nausea & Vomiting: no nausea and no vomiting  Complications: no  Cardiovascular status: blood pressure returned to baseline  Respiratory status: acceptable  Hydration status: euvolemic  Multimodal analgesia pain management approach

## 2022-07-14 NOTE — PROGRESS NOTES
Pt states he is not sure he is ready to eat yet post op. Covered with sliding scale only. If pt eats will give prandial dose.

## 2022-07-14 NOTE — H&P
Date of Surgery Update:      Michelle Davis  was seen, history and physical examination reviewed, and patient examined by me today. There have been no significant clinical changes since the completion of the previous history and physical.    The patient and I discussed that this is an elective procedure/surgery. We discussed the risks of the procedure/surgery, including but not limited to what is outlined in the signed informed consent. We also discussed the risk of digna COVID 19 while in the facility. We discussed the increased risk of a bad outcome should the patient contract COVID 19 during the post-procedural/post-operative period, given the patients current health condition, chronic conditions, and the added risk of COVID 19 in light of these conditions. The patient and I also discussed the risk of further postponing the procedure/surgery and other treatment alternatives, including non-procedural/surgical treatments. The risk, benefits, and alternatives of the proposed procedure have been explained to the patient (or appropriate guardian) and understanding verbalized. All questions answered. Patient wishes to proceed.         Electronically signed by: Chao Sharp MD,7/14/2022,9:39 AM

## 2022-07-15 VITALS
SYSTOLIC BLOOD PRESSURE: 86 MMHG | TEMPERATURE: 98 F | WEIGHT: 265 LBS | BODY MASS INDEX: 35.89 KG/M2 | DIASTOLIC BLOOD PRESSURE: 44 MMHG | HEART RATE: 56 BPM | RESPIRATION RATE: 16 BRPM | HEIGHT: 72 IN | OXYGEN SATURATION: 96 %

## 2022-07-15 PROBLEM — D62 ACUTE BLOOD LOSS AS CAUSE OF POSTOPERATIVE ANEMIA: Status: ACTIVE | Noted: 2022-07-15

## 2022-07-15 LAB
ANION GAP SERPL CALCULATED.3IONS-SCNC: 9 MMOL/L (ref 3–16)
BASOPHILS ABSOLUTE: 0 K/UL (ref 0–0.2)
BASOPHILS RELATIVE PERCENT: 0.2 %
BUN BLDV-MCNC: 45 MG/DL (ref 7–20)
CALCIUM SERPL-MCNC: 8.4 MG/DL (ref 8.3–10.6)
CHLORIDE BLD-SCNC: 103 MMOL/L (ref 99–110)
CO2: 23 MMOL/L (ref 21–32)
CREAT SERPL-MCNC: 1.2 MG/DL (ref 0.8–1.3)
EOSINOPHILS ABSOLUTE: 0 K/UL (ref 0–0.6)
EOSINOPHILS RELATIVE PERCENT: 0 %
ESTIMATED AVERAGE GLUCOSE: 180 MG/DL
GFR AFRICAN AMERICAN: >60
GFR NON-AFRICAN AMERICAN: 58
GLUCOSE BLD-MCNC: 275 MG/DL (ref 70–99)
GLUCOSE BLD-MCNC: 293 MG/DL (ref 70–99)
GLUCOSE BLD-MCNC: 65 MG/DL (ref 70–99)
GLUCOSE BLD-MCNC: 67 MG/DL (ref 70–99)
GLUCOSE BLD-MCNC: 74 MG/DL (ref 70–99)
GLUCOSE BLD-MCNC: 94 MG/DL (ref 70–99)
HBA1C MFR BLD: 7.9 %
HCT VFR BLD CALC: 33.3 % (ref 40.5–52.5)
HEMOGLOBIN: 11.2 G/DL (ref 13.5–17.5)
LYMPHOCYTES ABSOLUTE: 0.7 K/UL (ref 1–5.1)
LYMPHOCYTES RELATIVE PERCENT: 7.1 %
MCH RBC QN AUTO: 33.4 PG (ref 26–34)
MCHC RBC AUTO-ENTMCNC: 33.7 G/DL (ref 31–36)
MCV RBC AUTO: 99.1 FL (ref 80–100)
MONOCYTES ABSOLUTE: 0.4 K/UL (ref 0–1.3)
MONOCYTES RELATIVE PERCENT: 4.5 %
NEUTROPHILS ABSOLUTE: 8.6 K/UL (ref 1.7–7.7)
NEUTROPHILS RELATIVE PERCENT: 88.2 %
PDW BLD-RTO: 13.5 % (ref 12.4–15.4)
PERFORMED ON: ABNORMAL
PERFORMED ON: NORMAL
PERFORMED ON: NORMAL
PLATELET # BLD: 154 K/UL (ref 135–450)
PMV BLD AUTO: 9.2 FL (ref 5–10.5)
POTASSIUM SERPL-SCNC: 5.1 MMOL/L (ref 3.5–5.1)
RBC # BLD: 3.36 M/UL (ref 4.2–5.9)
SODIUM BLD-SCNC: 135 MMOL/L (ref 136–145)
WBC # BLD: 9.8 K/UL (ref 4–11)

## 2022-07-15 PROCEDURE — 97110 THERAPEUTIC EXERCISES: CPT

## 2022-07-15 PROCEDURE — 6370000000 HC RX 637 (ALT 250 FOR IP): Performed by: ORTHOPAEDIC SURGERY

## 2022-07-15 PROCEDURE — 97530 THERAPEUTIC ACTIVITIES: CPT

## 2022-07-15 PROCEDURE — 6360000002 HC RX W HCPCS: Performed by: ORTHOPAEDIC SURGERY

## 2022-07-15 PROCEDURE — 2580000003 HC RX 258: Performed by: INTERNAL MEDICINE

## 2022-07-15 PROCEDURE — 94760 N-INVAS EAR/PLS OXIMETRY 1: CPT

## 2022-07-15 PROCEDURE — 36415 COLL VENOUS BLD VENIPUNCTURE: CPT

## 2022-07-15 PROCEDURE — 97116 GAIT TRAINING THERAPY: CPT

## 2022-07-15 PROCEDURE — 2580000003 HC RX 258: Performed by: ORTHOPAEDIC SURGERY

## 2022-07-15 PROCEDURE — 6370000000 HC RX 637 (ALT 250 FOR IP): Performed by: INTERNAL MEDICINE

## 2022-07-15 PROCEDURE — 85025 COMPLETE CBC W/AUTO DIFF WBC: CPT

## 2022-07-15 PROCEDURE — 97535 SELF CARE MNGMENT TRAINING: CPT

## 2022-07-15 PROCEDURE — 80048 BASIC METABOLIC PNL TOTAL CA: CPT

## 2022-07-15 RX ORDER — CEFADROXIL 500 MG/1
500 CAPSULE ORAL 2 TIMES DAILY
Qty: 28 CAPSULE | Refills: 0 | Status: SHIPPED | OUTPATIENT
Start: 2022-07-15 | End: 2022-07-29

## 2022-07-15 RX ORDER — SODIUM CHLORIDE 9 MG/ML
INJECTION, SOLUTION INTRAVENOUS CONTINUOUS
Status: DISCONTINUED | OUTPATIENT
Start: 2022-07-15 | End: 2022-07-15 | Stop reason: HOSPADM

## 2022-07-15 RX ADMIN — ACETAMINOPHEN 1000 MG: 500 TABLET ORAL at 12:53

## 2022-07-15 RX ADMIN — INSULIN LISPRO 6 UNITS: 100 INJECTION, SOLUTION INTRAVENOUS; SUBCUTANEOUS at 07:41

## 2022-07-15 RX ADMIN — DEXTROSE MONOHYDRATE 125 ML: 100 INJECTION, SOLUTION INTRAVENOUS at 12:52

## 2022-07-15 RX ADMIN — GABAPENTIN 300 MG: 300 CAPSULE ORAL at 07:39

## 2022-07-15 RX ADMIN — ALOGLIPTIN 12.5 MG: 12.5 TABLET, FILM COATED ORAL at 07:39

## 2022-07-15 RX ADMIN — APIXABAN 5 MG: 5 TABLET, FILM COATED ORAL at 07:39

## 2022-07-15 RX ADMIN — SODIUM CHLORIDE: 9 INJECTION, SOLUTION INTRAVENOUS at 13:50

## 2022-07-15 RX ADMIN — OXYCODONE 5 MG: 5 TABLET ORAL at 14:30

## 2022-07-15 RX ADMIN — GABAPENTIN 300 MG: 300 CAPSULE ORAL at 12:53

## 2022-07-15 RX ADMIN — APIXABAN 5 MG: 5 TABLET, FILM COATED ORAL at 00:09

## 2022-07-15 RX ADMIN — TORSEMIDE 10 MG: 20 TABLET ORAL at 07:39

## 2022-07-15 RX ADMIN — ASPIRIN 81 MG: 81 TABLET, COATED ORAL at 07:39

## 2022-07-15 RX ADMIN — ACETAMINOPHEN 1000 MG: 500 TABLET ORAL at 06:07

## 2022-07-15 RX ADMIN — LEVOTHYROXINE SODIUM 125 MCG: 0.12 TABLET ORAL at 06:07

## 2022-07-15 RX ADMIN — PANTOPRAZOLE SODIUM 40 MG: 40 TABLET, DELAYED RELEASE ORAL at 06:07

## 2022-07-15 RX ADMIN — CEFAZOLIN 3000 MG: 10 INJECTION, POWDER, FOR SOLUTION INTRAVENOUS; PARENTERAL at 04:39

## 2022-07-15 RX ADMIN — INSULIN LISPRO 10 UNITS: 100 INJECTION, SOLUTION INTRAVENOUS; SUBCUTANEOUS at 07:41

## 2022-07-15 RX ADMIN — Medication 10 ML: at 07:39

## 2022-07-15 ASSESSMENT — PAIN SCALES - GENERAL
PAINLEVEL_OUTOF10: 0
PAINLEVEL_OUTOF10: 0
PAINLEVEL_OUTOF10: 7

## 2022-07-15 NOTE — PROGRESS NOTES
Pt hypoglycemic at 65, administered dextrose per MD orders. Pt blood sugar came up to 97. Pt blood pressure 86/44, Md Burrell notified. Fluid bolus ordered, administered per MD orders. BP came up to 114/67. Md notified, states pt is okay for discharge. Md states to encourage pt to continue to eat, drink juice or regular soda. Pt made aware. Ortho nurse navigator made aware of pts discharge clearance.

## 2022-07-15 NOTE — PLAN OF CARE
Problem: Discharge Planning  Goal: Discharge to home or other facility with appropriate resources  Outcome: Progressing  Flowsheets (Taken 7/14/2022 1514 by Amanda Rudolph RN)  Discharge to home or other facility with appropriate resources: Identify barriers to discharge with patient and caregiver     Problem: Chronic Conditions and Co-morbidities  Goal: Patient's chronic conditions and co-morbidity symptoms are monitored and maintained or improved  Outcome: Progressing     Problem: Safety - Adult  Goal: Free from fall injury  Outcome: Progressing     Problem: Pain  Goal: Verbalizes/displays adequate comfort level or baseline comfort level  Outcome: Progressing     Problem: ABCDS Injury Assessment  Goal: Absence of physical injury  Outcome: Progressing     Problem: Skin/Tissue Integrity  Goal: Absence of new skin breakdown  Description: 1. Monitor for areas of redness and/or skin breakdown  2. Assess vascular access sites hourly  3. Every 4-6 hours minimum:  Change oxygen saturation probe site  4. Every 4-6 hours:  If on nasal continuous positive airway pressure, respiratory therapy assess nares and determine need for appliance change or resting period.   Outcome: Progressing

## 2022-07-15 NOTE — PLAN OF CARE
Problem: Discharge Planning  Goal: Discharge to home or other facility with appropriate resources  7/15/2022 0849 by Saima Dean RN  Outcome: Progressing  7/14/2022 2253 by Modesta Teresa RN  Outcome: Progressing  Flowsheets (Taken 7/14/2022 1514 by Da Fragoso RN)  Discharge to home or other facility with appropriate resources: Identify barriers to discharge with patient and caregiver     Problem: Chronic Conditions and Co-morbidities  Goal: Patient's chronic conditions and co-morbidity symptoms are monitored and maintained or improved  7/15/2022 0849 by Saima Dean RN  Outcome: Progressing  7/14/2022 2253 by Modesta Teresa RN  Outcome: Progressing     Problem: Safety - Adult  Goal: Free from fall injury  7/15/2022 0849 by Saima Dean RN  Outcome: Progressing  7/14/2022 2253 by Modesta Teresa RN  Outcome: Progressing     Problem: Pain  Goal: Verbalizes/displays adequate comfort level or baseline comfort level  7/15/2022 0849 by Saima Dean RN  Outcome: Progressing  7/14/2022 2253 by Modesta Teresa RN  Outcome: Progressing     Problem: ABCDS Injury Assessment  Goal: Absence of physical injury  7/15/2022 0849 by Saima Dean RN  Outcome: Progressing  7/14/2022 2253 by Modesta Teresa RN  Outcome: Progressing     Problem: Skin/Tissue Integrity  Goal: Absence of new skin breakdown  Description: 1. Monitor for areas of redness and/or skin breakdown  2. Assess vascular access sites hourly  3. Every 4-6 hours minimum:  Change oxygen saturation probe site  4. Every 4-6 hours:  If on nasal continuous positive airway pressure, respiratory therapy assess nares and determine need for appliance change or resting period.   7/15/2022 0849 by Saima Dean RN  Outcome: Progressing  7/14/2022 2253 by Modesta Teresa RN  Outcome: Progressing

## 2022-07-15 NOTE — CARE COORDINATION
[facility] home care referral. Spoke with pt and re: home care plan of care/services. Agreeable. Demographic's verified. Will follow for home care. Aware of discharge with home care. Home care orders sent to [facility]. Discharge planner notified.

## 2022-07-15 NOTE — PROGRESS NOTES
Met with patient at bedside, patient is alert and oriented. Patient in chair. We discussed role of nurse navigator and gave contact information. Reviewed reasons to call with questions or concerns, importance of TEDS, incentive spirometer, pain medication, and physical and occupational therapy. Chair in lowest position, fall precautions in place, call light within reach. Pulses present bilaterally +2, no drainage or odor noted at surgical dressing. Ace wrap in place. Dressing clean, dry, and intact. Ice in place. Heber and scds on right leg. Neurovascular checks performed and WNLs. Plan: Discharge today with home health. Will arrange. spouse will transport patient. DME needs:  needs rolling walker-ordered    Prescriptions sent downstairs. Hao Disla         Orthopedic Nurse Navigator  Phone number: [phone]    Follow up appointments:    No future appointments.

## 2022-07-15 NOTE — PROGRESS NOTES
[facility]   Phone: [phone]    Occupational Therapy    [] Initial Evaluation            [x] Daily Treatment Note         [] Discharge Summary      Patient: Gogo Stover   : 1940   MRN: [id number]   Date of Service:  7/15/2022    Admitting Diagnosis:  Failed total knee, left, subsequent encounter  Current Admission Summary: L TKA  Past Medical History:  has a past medical history of Arthritis, Atrial fibrillation ([address] CAD (coronary artery disease), Diabetes mellitus ([address] Hyperlipidemia, Hypertension, and Thyroid disease. Past Surgical History:  has a past surgical history that includes Cardiac surgery; Carotid endarterectomy; sinus surgery; joint replacement; back surgery; Foot surgery; and Revision total knee arthroplasty (Left, 2022). Discharge Recommendations: Gogo Stover scored a 17/24 on the AM-PAC ADL Inpatient form. Current research shows that an AM-PAC score of 18 or greater is typically associated with a discharge to the patient's home setting. Based on the patient's AM-PAC score, and their current ADL deficits, it is recommended that the patient have 2-3 sessions per week of Occupational Therapy at d/c to increase the patient's independence. At this time, this patient demonstrates the endurance and safety to discharge home with home (home vs OP services) and a follow up treatment frequency of 2-3x/wk. Please see assessment section for further patient specific details. If patient discharges prior to next session this note will serve as a discharge summary. Please see below for the latest assessment towards goals.    HOME HEALTH CARE: LEVEL 1 STANDARD    - Initial home health evaluation to occur within 24-48 hours, in patient home   - Therapy to evaluate with goal of regaining prior level of functioning   - Therapy to evaluate if patient has [address] needs for personal care    Pt goes by \"Don\"      DME Required For Discharge: patient has all required DME for discharge    Precautions/Restrictions: high fall risk, weight bearing  Weight Bearing Restrictions: weight bearing as tolerated  [] Right Upper Extremity  [] Left Upper Extremity [] Right Lower Extremity  [x] Left Lower Extremity     Required Braces/Orthotics: no braces required   [] Right  [] Left  Positional Restrictions:no positional restrictions    Pre-Admission Information   Lives With: spouse    Type of Home: house  Home Layout: one level  Home Access: level entry  Bathroom Layout: walk in shower, . Comment: bathroom not accessible with RW  Toilet Height: elevated height  Bathroom Equipment: grab bars in shower, shower chair, hand held shower head  Home Equipment: rollator - 4 wheeled walker  Transfer Assistance: Independent without use of device  Ambulation Assistance:modified independent with use of 4WW d/t pain  ADL Assistance: independent with all ADL's  IADL Assistance: requires assistance with all homemaking tasks, requires assistance with meal prep, requires assistance with laundry, requires assistance with vacuuming, requires assistance with cleaning, pt empties , pt gets his own breakfast and lunch  Active :        [x] Yes  [] No  Current Employment: retired  Hobbies:   Recent Falls: pt denies falls      Subjective  General: Pt pleasant and agreeable to OT tx session. Pain: 2/10. Location: L knee  Pain Interventions: RN notified        Activities of Daily Living  Basic Activities of Daily Living  Grooming: supervision  Upper Extremity Dressing: setup assistance. Equipment: none  Lower Extremity Dressing: minimal assistance. Equipment: none  General Comments: Pt educated on safety with showering and toilet transfers; education and instructed on LB dressing   Instrumental Activities of Daily Living  No IADL completed on this date. Functional Mobility  Bed Mobility  Bed mobility not completed on this date.   Comments: HOB elevated  Transfers  Sit to stand transfer:stand by assistance  Stand to sit transfer: stand by assistance  Bed / Chair Transfer: stand by assistance. Mobility technique: ambulating. Equipment utilized: rolling walker  Toilet transfer: stand by assistance. Mobility technique: ambulating. Equipment utilized: raised toilet seat with rails  Comments:   Functional Mobility:  Functional Mobility: rolling walker. stand by assistance. Activity: to/from bathroom   Increased time for ambulation      Cognition  WFL-flat affect  Orientation:    alert and oriented x 4  Command Following:   accurately follows one step commands     Education  Barriers To Learning: none  Patient Education: patient educated on goals, OT role and benefits, plan of care, precautions, ADL adaptive strategies, weight-bearing education, HEP, transfer training, discharge recommendations  Learning Assessment:  patient verbalizes understanding, would benefit from continued reinforcement    Assessment  Activity Tolerance: Pt demonstrated good tolerance for therapy this date. Impairments Requiring Therapeutic Intervention: decreased functional mobility, decreased ADL status, decreased ROM, decreased strength, decreased balance  Prognosis: good  Clinical Assessment: pt not at baseline, typically uses 4WW for ambulation and is independent with ADL tasks.  Pt would benefit from ongoing skilled OT services in order to maximize independence in order to return to OF   Safety Interventions: patient left in chair, chair alarm in place, call light within reach, gait belt, patient at risk for falls, nurse notified and family/caregiver present    Plan  Frequency: 7 x/week  Current Treatment Recommendations: strengthening, ROM, balance training, functional mobility training, transfer training, patient/caregiver education, ADL/self-care training and pain management    Goals  Patient Goals: pt did not state   Short Term Goals:  Time Frame: discharge- goals ongoing Bed mobility mod I  Functional ADL transfer mod I  Functional mobility with mod I with RW  UB ADLs mod I  LB ADLs mod I with AE as needed      Therapy Session Time     Individual Group Co-treatment   Time In  1037     Time Out  1115     Minutes  38          Timed Code Treatment Minutes:  38 mins          Electronically Signed By: MIR Valdez/BHAVNA

## 2022-07-15 NOTE — PROGRESS NOTES
[facility] [address]   Phone: [phone]    Physical Therapy    [] Initial Evaluation            [x] Daily Treatment Note         [] Discharge Summary  AM and PM sessions    Patient: Ene Ruiz   : 1940   MRN: [id number]   Date of Service:  7/15/2022  Admitting Diagnosis: Failed total knee, left, subsequent encounter  Current Admission Summary: The pt presented for a L TKR. Past Medical History:  has a past medical history of Arthritis, Atrial fibrillation ([facility] [address] CAD (coronary artery disease), Diabetes mellitus ([facility] [address] Hyperlipidemia, Hypertension, and Thyroid disease. Past Surgical History:  has a past surgical history that includes Cardiac surgery; Carotid endarterectomy; sinus surgery; joint replacement; back surgery; Foot surgery; and Revision total knee arthroplasty (Left, 2022). Discharge Recommendations: Ene Ruiz scored a 16/24 on the AM-PAC short mobility form. Current research shows that an AM-PAC score of 18 or greater is typically associated with a discharge to the patient's home setting. Based on the patient's AM-PAC score and their current functional mobility deficits, it is recommended that the patient have 2-3 sessions per week of Physical Therapy at d/c to increase the patient's independence. At this time, this patient demonstrates the endurance and safety to discharge home with home services and a follow up treatment frequency of 2-3x/wk. Please see assessment section for further patient specific details. While pt's AM-PAC score does not meet 18/24 score associated with home discharge, the pt has support of grandchildren during his recovery, and has only one step to enter the home. Pt's score was impacted by second session, where he required more assist due to low blood sugar/pressure; pt function is expected to improve as these vitals come back to typical values.    If patient discharges prior to next session this note will serve as a discharge summary. Please see below for the latest assessment towards goals. HOME HEALTH CARE: LEVEL 3 SAFETY  - Initial home health evaluation to occur within 24-48 hours, in patient home   - Therapy evaluations in home within 24-48 hours of discharge; including DME and home safety   - Frontload therapy 5 days, then 3x a week   - Therapy to evaluate if patient has [address] needs for personal care   -  evaluation within 24-48 hours, includes evaluation of resources and insurance to determine [place], [place], LTC, and Medicaid options      DME Required For Discharge: rolling walker  Precautions/Restrictions: high fall risk  Weight Bearing Restrictions: weight bearing as tolerated  [] Right Upper Extremity  [] Left Upper Extremity [] Right Lower Extremity  [x] Left Lower Extremity     Required Braces/Orthotics: no braces required   [] Right  [] Left  Positional Restrictions:no positional restrictions    Pre-Admission Information   Lives With: spouse                  Type of Home: house  Home Layout: one level  Home Access: level entry  [facility]: walk in shower, .   Comment: bathroom not accessible with RW  Toilet Height: elevated height  Bathroom Equipment: grab bars in shower, shower chair, hand held shower head  Home Equipment: rollator - 4 wheeled walker  Transfer Assistance: Independent without use of device  Ambulation Assistance:modified independent with use of 4WW d/t pain  ADL Assistance: independent with all ADL's  IADL Assistance: requires assistance with all homemaking tasks, requires assistance with meal prep, requires assistance with laundry, requires assistance with vacuuming, requires assistance with cleaning, pt empties , pt gets his own breakfast and lunch  Active :        [x] Yes                 [] No  Current Employment: retired  Hobbies:   Recent Falls: pt denies falls  Per family granddaughter(s) will be staying to assist. Wife cannot provide physical assistance. Subjective  General: Pt seated in recliner upon therapist arrival. Pt agreeable to PT treatment. Pain: 0/10 at rest  Pain Interventions: not applicable       Functional Mobility  Bed Mobility  Bed mobility not completed on this date. Comments: Due to pt seated in recliner at beginning/end of therapy  Transfers  Sit to stand transfer: contact guard assistance, minimal assistance (min A during first sit > stand from recliner and from toilet, CGA from car and from chair)   Stand to sit transfer: contact guard assistance  Comments: Decreased eccentric control in stand to sit  Ambulation  Assistive Device: rolling walker  Assistance: contact guard assistance  Distance: 10' + 61' + 28' + 30' + 60' + 25'  Gait Mechanics: step-to gait pattern, verbal cueing required to keep walker close to trunk, decreased step length with LLE/antalgic pattern  Comments: While ambulating backwards prior to initiating stand>sit transfers, pt takes small slow steps leading with L LE. Pt appears unsteady and required VC for leading with non-surgical LE. Pt ambulated to the bathroom 10' and stood at the toilet for toileting. Pt took a brief seated rest break, and ambulated 60' into the hallway requiring a brief standing rest break. The pt ambulated the rest of the distance to the therapy gym (35'), and took a seated rest break prior to completing car transfer and step (30' ambulating in gym). Pt ambulated 61' and required an additional standing rest break, and then completed 25' back to his recliner. Stair Mobility  Number of Steps: 1  Step Height: 4 inch  Device: rolling walker  Assistance: contact guard assistance  Comments: Pt requiring cues for walker management and \"up with the good down with the bad\"  Wheelchair Mobility:  No w/c mobility completed on this date.   Comments:  Balance  Static Sitting Balance: fair (+): maintains balance at SBA/supervision without use of UE support  Dynamic Sitting Balance: fair (+): maintains balance at SBA/supervision without use of UE support  Static Standing Balance: fair (-): maintains balance at CGA with use of UE support  Dynamic Standing Balance: fair (-): maintains balance at CGA with use of UE support  Comments:    Other Therapeutic Interventions   Heel slides x6 - cueing for posture, sitting forward off the chair  Quad set x4 squeezes 4' hold - cued to push back of knee into fingers  Knee flexion: 99 degrees  Knee extension: 24 degrees from 0    Second Session:  Subjective:  Pt seated in recliner upon therapist arrival. Pt agreeable to PT treatment. 0/10 pain reported, although pt states his blood sugar is currently 76 which is very low for him. Objective:  Pt completed sit to stand from recliner at mod A, and ambulated 6' forward in the room. Pt was instructed to ambulate backwards with verbal cueing for taking large steps and leading with non-surgical LE. Pt demonstrated improvement with this task compared to earlier session, as he was able to take larger steps with less cueing with another 6' rep. Pt then reported dizziness, and completed stand > sit at [facility]. BP was found to be 92/54 seated (110/64 baseline). After a brief rest break, pt completed sit > stand at min A, and ambulated backwards to [facility] with stand > sit requiring min A. Pt performed sit > stand from [facility] with mod A. Pt required tactile and verbal cueing for walker management when practicing curb step in therapy gym due to inability to recall earlier demonstration or problem solve. Pt and family educated on correctly navigating curb step with walker. Pt's family states that pt becomes confused with low blood sugar and pt's current cognitive status is typical in this situation. Pt completed stand > sit  back to [facility] with min A, and sit > stand out of  with min A. Stand > sit back to recliner chair CGA. Final BP taken 92/46 (MAP 62), with therapist elevating pt's legs in recliner.  Pt provided with call light, family present, nurse notified, chair alarm set, and gait belt used throughout session. Functional Outcomes  AM-PAC Inpatient Mobility Raw Score : 16              Cognition  Overall Cognitive Status: Impaired  Memory: decreased recall of recent events, decreased short term memory  Safety Judgement: decreased awareness of need for assistance, decreased awareness of need for safety  Problem Solving: assistance required to generate solutions, assistance required to implement solutions, decreased awareness of errors, assistance required to identify errors made  **Pt demonstrated decreased safety judgement and problem solving throughout first and second session, with decreased recall of recent events noted during second session with pt unable to remember how to safely ambulate up curb step. Orientation:    alert and oriented x 4  Command Following:   [facility]    Education  Barriers To Learning: hearing  Patient Education: patient educated on goals, PT role and benefits, plan of care, precautions, weight-bearing education, HEP, general safety, functional mobility training, proper use of assistive device/equipment, family education, transfer training  Learning Assessment:  patient verbalizes understanding, would benefit from continued reinforcement    Assessment  Activity Tolerance: Pt O2 sat 92% both prior to and after completing ambulation. Pt appeared SOB after toileting, with SpO2 91%. Pt fatigued quickly during ambulation requiring multiple rest breaks. Impairments Requiring Therapeutic Intervention: decreased functional mobility, decreased ROM, decreased strength, decreased safety awareness, decreased cognition, decreased endurance, decreased balance, increased pain, decreased posture  Prognosis: good  Clinical Assessment: Pt is an 79 y.o male who presents with the above impairments.  Pt required verbal cueing for walker management (I.e. remembering to bring the walker with him during stand to sit/ car transfers) as well as cueing for problem solving when attempting to ambulate backwards. Pt presents below his baseline with pain at surgical site, decreased ROM, and decreased endurance. Pt appeared more confused during second session and required more assist, however blood sugar and blood pressure changes along with fatigue may have influenced pt cognition and activity tolerance. Due to cognitive deficits and decreased safety awareness, 24/7 supervision recommended upon pt discharge. Pt will continue to benefit from skilled physical therapy to address impairments. Safety Interventions: patient left in chair, chair alarm in place, call light within reach, gait belt, patient at risk for falls, nurse notified, and family/caregiver present    Plan  Frequency: 7 x/week BID tx  Current Treatment Recommendations: strengthening, ROM, balance training, functional mobility training, transfer training, gait training, stair training, endurance training, neuromuscular re-education, pain management, home exercise program, and safety education  Goals  Patient Goals: return home with wife, wife is having a knee surgery in August    Short Term Goals:  Time Frame:  To be met prior to DC  Patient will complete bed mobility at [facility] independent   Patient will complete transfers at [facility] independent   Patient will ambulate 75 ft with use of rolling walker at supervision  Patient will ascend/descend 1 stairs with RW at contact guard assistance- Goal met  -New goal: Patient will ascend/descend 1 curb step with 2WW at modified independent with min-no cueing  Patient will complete car transfer at contact guard assistance - Goal met  -New goal: Patient will complete car transfer at [facility] with use of 2WW    Therapy Session Time      Individual Group Co-treatment   Time In 0841       Time Out 0944       Minutes 63         Second Session Time   Individual Group Co-treatment   Time In [facility]       Time Out 1254       Minutes 32 Timed Code Treatment Minutes:  95 Minutes (61 +32)  Total Treatment Minutes:  95 Minutes       Alvina Left [facility], SPT  Therapist was present, directed the patient's care, made skilled judgement, and was responsible for assessment and treatment of the patient.         Electronically Signed By: PASCUAL Elias PT, DPT #[id number]

## 2022-07-15 NOTE — DISCHARGE INSTR - COC
Continuity of Care Form    Patient Name: Michelle Davis   :  1940  MRN:  [id number]    Admit date:  2022  Discharge date:  ***    Code Status Order: Full Code   Advance Directives:   Advance Care Flowsheet Documentation       Date/Time Healthcare Directive Type of Healthcare Directive Copy in [address] Agent's Name Healthcare Agent's Phone Number    [phone] Yes, patient has an advance directive for healthcare treatment -- -- -- -- --            Admitting Physician:  Chao Sharp MD  PCP: Rohith Costa    Discharging Nurse: [facility] Unit/Room#: 4YF-6825/9978-85  Discharging Unit Phone Number: ***    Emergency Contact:   Extended Emergency Contact Information  Primary Emergency Contact: cinthya del toro  Home Phone: [phone]  Relation: Spouse    Past Surgical History:  Past Surgical History:   Procedure Laterality Date    BACK SURGERY      cervical   lumbar    CARDIAC SURGERY      bypassx4    CAROTID ENDARTERECTOMY      FOOT SURGERY      left    JOINT REPLACEMENT      REVISION TOTAL KNEE ARTHROPLASTY Left 2022    REVISION LEFT TOTAL KNEE ARTHROPLASTY-CHERRY; ADDUCTOR BLOCK performed by Chao Sharp MD at [facility]         Immunization History: There is no immunization history on file for this patient.     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    Failed total knee, right, subsequent encounter T84.012D    HTN (hypertension) I10    Hypothyroid E03.9    DM[facility]di[facility] mellitus[facility] E11.9    Acute blood loss as cause of postoperative anemia D62       Isolation/Infection:   Isolation            No Isolation          Patient Infection Status       None to display            Nurse Assessment:  Last Vital Signs: /64   Pulse 55   Temp 98.2 °F (36.8 °C) (Oral)   Resp 16   Ht 6' (1.829 m)   Wt 265 lb (120.2 kg)   SpO2 95%   BMI 35.94 kg/m²     Last documented pain score (0-10 scale): Pain Level: 0  Last Weight:   Wt Readings from Last 1 Encounters:   07/14/22 265 lb (120.2 kg)     Mental Status:  {IP PT MENTAL STATUS:20030}    IV Access:  { CAYLA IV ACCESS:086165051}    Nursing Mobility/ADLs:  Walking   {P DME AOGZ:730662274}  Transfer  {P DME INMI:663098600}  Bathing  {P DME JJJK:902552087}  Dressing  {CHP DME VTDD:528367461}  Toileting  {CHP DME RAES:201384530}  Feeding  {[facility] DME CBRZ:531987211}  Med Admin  {[facility] DME LGKA:926577998}  Med Delivery   { CAYLA MED Delivery:961148478}    Wound Care Documentation and Therapy:  Incision 07/14/22 Knee Anterior; Left (Active)   Dressing Status Clean;Dry; Intact 07/15/22 0745   Dressing/Treatment Other (comment) 07/15/22 0745   Closure Surgical glue 07/15/22 0745   Drainage Amount None 07/15/22 0745   Odor None 07/15/22 0745   Number of days: 0        Elimination:  Continence: Bowel: {YES / SANDRA:73121}  Bladder: {YES / EX:57102}  Urinary Catheter: {Urinary Catheter:038009105}   Colostomy/Ileostomy/Ileal Conduit: {YES / YS:70774}       Date of Last BM: ***    Intake/Output Summary (Last 24 hours) at 7/15/2022 1137  Last data filed at 7/15/2022 0847  Gross per 24 hour   Intake 2049.9 ml   Output 700 ml   Net 1349.9 ml     I/O last 3 completed shifts:   In: 1809.9 [P.O.:480; I.V.:1329.9]  Out: 600 [Urine:500; Blood:100]    Safety Concerns:     [employer] Safety Concerns:930813767}    Impairments/Disabilities:      [employer] Impairments/Disabilities:325663941}    Nutrition Therapy:  Current Nutrition Therapy:   [employer] Diet List:519235918}    Routes of Feeding: {P DME Other Feedings:688499358}  Liquids: {Slp liquid thickness:57244}  Daily Fluid Restriction: {CHP DME Yes amt example:655313511}  Last Modified Barium Swallow with Video (Video Swallowing Test): {Done Not Done QW:269764148}    Treatments at the Time of Hospital Discharge:   Respiratory Treatments: ***  Oxygen

## 2022-07-15 NOTE — PROGRESS NOTES
Department of Orthopedic Surgery     Progress Note    Subjective:   Admit Day:7/14/2022    Patient is comfortable appearing. Denies chest pain, shortness of air or calf pain. Tolerating PO. Objective[de-identified]    height is 6' (1.829 m) and weight is 265 lb (120.2 kg). His oral temperature is 97.3 °F (36.3 °C). His blood pressure is 106/67 and his pulse is 61. His respiration is 16 and oxygen saturation is 95%. Dressing cdi, calf soft, neg mary, nvi      Labs:  Lab Results   Component Value Date/Time    WBC 9.8 07/15/2022 04:28 AM    HGB 11.2 07/15/2022 04:28 AM    HCT 33.3 07/15/2022 04:28 AM     07/15/2022 04:28 AM       Lab Results   Component Value Date/Time    INR 1.16 07/14/2022 10:08 AM       Lab Results   Component Value Date/Time     07/15/2022 04:28 AM    K 5.1 07/15/2022 04:28 AM    CO2 23 07/15/2022 04:28 AM    BUN 45 07/15/2022 04:28 AM    CREATININE 1.2 07/15/2022 04:28 AM    CALCIUM 8.4 07/15/2022 04:28 AM       Assessment:   Principal Problem:    Failed total knee, left, subsequent encounter  Active Problems:    Failed total knee, right, subsequent encounter    HTN (hypertension)    Hypothyroid    DM2 (diabetes mellitus, type 2) ([facility] [address]  Resolved Problems:    * No resolved hospital problems.  *      s/p revision TKA      Plan:   Cont w pathway  Dc planning home today with home PT vs outpatient PT    Fu w ortho 2 weeks

## 2022-07-15 NOTE — PROGRESS NOTES
Patient alert and oriented. Reviewed discharge, follow up, and medication instructions with patient and family member. Patient states understanding. Educated patient  to wear coco hose for 2 weeks and to remove at night and use incentive spirometer and take all medication as directed. Patient given instructions that due to state law, pain medication was written for 7 days and cannot be filled early. Instructed patient to take small dose of narcotic as possible and call the office if refill is needed after 7 days. Prescription handed to patient and/or family. Waiting on second pt session before discharge.      Kim Laboy RN

## 2022-07-15 NOTE — PROGRESS NOTES
Shift assessment complete. Xray in with pt at start of shift. Meds given per eMAR. Pt ambulating with walker to bathroom well. In bed, alarms on, call light within reach. The care plan and education has been reviewed and mutually agreed upon with the patient.    Electronically signed by Adriana Hamilton RN on 7/14/2022 at 11:07 PM

## 2022-07-15 NOTE — PROGRESS NOTES
Progress Note - Dr. Shen Mason - Internal Medicine  PCP: Miriam  [place] [address] [phone]    Hospital Day: 1  Code Status: Full Code  Current Diet: ADULT DIET; Regular; 4 carb choices (60 gm/meal)        CC: follow up on medical issues    Subjective:   Malcom Pichardo is a 79 y.o. male. Pt seen and examined  Chart reviewed since last visit, labs and imaging below       Doing well  Pain controlled  Has not yet worked with therapy    Hgb lower 11.2    He denies chest pain, denies shortness of breath, denies nausea,  denies emesis. 10 system Review of Systems is reviewed with patient, and pertinent positives are noted in HPI above . Otherwise, Review of systems is negative. I have reviewed the patient's medical and social history in detail and updated the computerized patient record. To recap: He  has a past medical history of Arthritis, Atrial fibrillation ([facility] CAD (coronary artery disease), Diabetes mellitus ([facility] Hyperlipidemia, Hypertension, and Thyroid disease. Ann Harley He  has a past surgical history that includes Cardiac surgery; Carotid endarterectomy; sinus surgery; joint replacement; back surgery; Foot surgery; and Revision total knee arthroplasty (Left, 7/14/2022). nAn Souza He  reports that he has never smoked. He has never used smokeless tobacco. He reports current alcohol use. He reports that he does not use drugs. .        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] 07/14/2022     Priority: Medium    Failed total knee, right, subsequent encounter [T84.012D] 07/14/2022     Priority: Medium    HTN (hypertension) [I10] 07/14/2022     Priority: Medium    Hypothyroid [E03.9] 07/14/2022     Priority: Medium    DM2 (diabetes mellitus, type 2) ([facility] [E11.9] 07/14/2022     Priority: Medium       Current Facility-Administered Medications: amLODIPine (NORVASC) tablet 2.5 mg, 2.5 mg, Oral, Nightly  apixaban (ELIQUIS) tablet 5 mg, 5 mg, Oral, BID  aspirin EC tablet 81 mg, 81 mg, Oral, Daily  atorvastatin (LIPITOR) tablet 40 mg, 40 mg, Oral, Nightly  gabapentin (NEURONTIN) capsule 300 mg, 300 mg, Oral, 4x Daily  levothyroxine (SYNTHROID) tablet 125 mcg, 125 mcg, Oral, Daily  losartan (COZAAR) tablet 50 mg, 50 mg, Oral, Daily  pantoprazole (PROTONIX) tablet 40 mg, 40 mg, Oral, QAM AC  torsemide (DEMADEX) tablet 10 mg, 10 mg, Oral, Daily  0.9 % sodium chloride infusion, , IntraVENous, Continuous  sodium chloride flush 0.9 % injection 5-40 mL, 5-40 mL, IntraVENous, 2 times per day  sodium chloride flush 0.9 % injection 5-40 mL, 5-40 mL, IntraVENous, PRN  0.9 % sodium chloride infusion, , IntraVENous, PRN  acetaminophen (TYLENOL) tablet 1,000 mg, 1,000 mg, Oral, 3 times per day  oxyCODONE (ROXICODONE) immediate release tablet 5 mg, 5 mg, Oral, Q4H PRN **OR** oxyCODONE (ROXICODONE) immediate release tablet 10 mg, 10 mg, Oral, Q4H PRN  HYDROmorphone HCl PF (DILAUDID) injection 0.25 mg, 0.25 mg, IntraVENous, Q3H PRN **OR** HYDROmorphone HCl PF (DILAUDID) injection 0.5 mg, 0.5 mg, IntraVENous, Q3H PRN  bisacodyl (DULCOLAX) suppository 10 mg, 10 mg, Rectal, Daily PRN  insulin glargine (LANTUS) injection vial 35 Units, 35 Units, SubCUTAneous, Nightly  insulin lispro (HUMALOG) injection vial 10 Units, 10 Units, SubCUTAneous, TID WC  alogliptin (NESINA) tablet 12.5 mg, 12.5 mg, Oral, Daily  hydrALAZINE (APRESOLINE) injection 10 mg, 10 mg, IntraVENous, Q6H PRN  0.9 % sodium chloride bolus, 500 mL, IntraVENous, PRN  potassium chloride (KLOR-CON M) extended release tablet 40 mEq, 40 mEq, Oral, PRN **OR** potassium bicarb-citric acid (EFFER-K) effervescent tablet 40 mEq, 40 mEq, Oral, PRN **OR** potassium chloride 10 mEq/100 mL IVPB (Peripheral Line), 10 mEq, IntraVENous, PRN  insulin lispro (HUMALOG) injection vial 0-12 Units, 0-12 Units, SubCUTAneous, TID WC  insulin lispro (HUMALOG) injection vial 0-6 Units, 0-6 Units, SubCUTAneous, Nightly  glucose-vitamin C chewable tablet 4 tablet, 4 tablet, Oral, PRN  dextrose bolus 10% 125 mL, 125 mL, IntraVENous, PRN **OR** dextrose bolus 10% 250 mL, 250 mL, IntraVENous, PRN  glucagon (rDNA) injection 1 mg, 1 mg, IntraMUSCular, PRN  dextrose 5 % solution, 100 mL/hr, IntraVENous, PRN         Objective:  /64   Pulse 55   Temp 98.2 °F (36.8 °C) (Oral)   Resp 16   Ht 6' (1.829 m)   Wt 265 lb (120.2 kg)   SpO2 95%   BMI 35.94 kg/m²      Patient Vitals for the past 24 hrs:   BP Temp Temp src Pulse Resp SpO2 Height Weight   07/15/22 0745 110/64 98.2 °F (36.8 °C) Oral 55 16 95 % -- --   07/15/22 0415 106/67 97.3 °F (36.3 °C) Oral 61 16 95 % -- --   07/14/22 2345 126/65 98 °F (36.7 °C) Oral 75 16 98 % -- --   07/14/22 2032 92/66 -- -- -- -- -- -- --   07/14/22 1945 100/66 97.7 °F (36.5 °C) Oral 69 16 99 % -- --   07/14/22 1735 111/65 98.2 °F (36.8 °C) Oral 68 15 95 % -- --   07/14/22 1515 -- -- -- -- -- -- 6' (1.829 m) 265 lb (120.2 kg)   07/14/22 1505 129/65 98 °F (36.7 °C) Oral 65 16 97 % -- --   07/14/22 1445 136/61 97.6 °F (36.4 °C) Oral 68 16 97 % -- --   07/14/22 1415 114/61 -- -- 70 15 99 % -- --   07/14/22 1414 -- -- -- -- -- 94 % -- --   07/14/22 1410 -- -- -- -- -- 92 % -- --   07/14/22 1405 -- -- -- -- -- 100 % -- --   07/14/22 1400 (!) 114/52 -- -- 72 (!) 9 99 % -- --   07/14/22 1356 -- -- -- 73 13 99 % -- --   07/14/22 1355 (!) 121/54 97 °F (36.1 °C) Temporal 75 13 99 % -- --   07/14/22 1350 (!) 113/95 -- -- 81 20 99 % -- --   07/14/22 1345 (!) 115/50 -- -- 83 21 99 % -- --   07/14/22 1340 (!) 130/57 97 °F (36.1 °C) Temporal 86 17 99 % -- --   07/14/22 1100 (!) 146/74 -- -- 62 16 100 % -- --   07/14/22 1016 (!) 166/80 (!) 96.5 °F (35.8 °C) Temporal 66 20 96 % -- 265 lb (120.2 kg)     Patient Vitals for the past 96 hrs (Last 3 readings):   Weight   07/14/22 1515 265 lb (120.2 kg) 07/14/22 1016 265 lb (120.2 kg)           Intake/Output Summary (Last 24 hours) at 7/15/2022 7725  Last data filed at 7/15/2022 0708  Gross per 24 hour   Intake 1809.9 ml   Output 700 ml   Net 1109.9 ml         Physical Exam:   Vitals as above  General appearance: alert, appears stated age and cooperative    Head: Normocephalic, without obvious abnormality, atraumatic    Lungs: clear to auscultation bilaterally    Heart: regular rate and rhythm, S1, S2 normal, no murmur    Abdomen: soft, non-tender; bowel sounds normal; no masses, no organomegaly    Extremities: extremities normal, atraumatic, no cyanosis, no edema dressing to knee c/d/i    Labs:  Lab Results   Component Value Date    WBC 9.8 07/15/2022    HGB 11.2 (L) 07/15/2022    HCT 33.3 (L) 07/15/2022     07/15/2022    ALT 17 06/22/2022    AST 25 06/22/2022     (L) 07/15/2022    K 5.1 07/15/2022     07/15/2022    CREATININE 1.2 07/15/2022    BUN 45 (H) 07/15/2022    CO2 23 07/15/2022    INR 1.16 (H) 07/14/2022    LABMICR Not Indicated 06/22/2022     No results found for: CKTOTAL, CKMB, CKMBINDEX, TROPONINI    Recent Imaging Results are Reviewed:  XR KNEE LEFT (1-2 VIEWS)    Result Date: 7/14/2022  EXAMINATION: 2  XRAY VIEWS OF THE LEFT KNEE 7/14/2022 6:42 pm COMPARISON: None. HISTORY: ORDERING SYSTEM PROVIDED HISTORY: post op film TECHNOLOGIST PROVIDED HISTORY: Reason for exam:->post op film Reason for Exam: post op FINDINGS: There is a total knee arthroplasty with partially imaged long stem components. No acute fracture or dislocation. No acute hardware failure or loosening. Grossly normal alignment. 1     Total knee arthropasty without acute hardware complication, as seen. The long stem components are partially imaged.        Lab Results   Component Value Date/Time    GLUCOSE 275 07/15/2022 04:28 AM     Lab Results   Component Value Date/Time    POCGLU 293 07/15/2022 07:08 AM     /64   Pulse 55   Temp 98.2 °F (36.8 °C) (Oral)

## 2022-07-18 ENCOUNTER — TELEPHONE (OUTPATIENT)
Dept: INPATIENT UNIT | Age: 82
End: 2022-07-18

## 2022-07-19 NOTE — DISCHARGE SUMMARY
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 The patient was taken to the post operative anesthesia recovery unit in stable condition. The patient was then transferred to the orthopaedic floor for post operative pain management and convalesce          (x )The patient was placed on anticoagulation therapy for DVT prophylaxis       The patient was discharged in stable condition . Please see medical reconciliation for discharge medications. The discharge instructions were explained to the patient and the family. The patient will follow up in the office in 2 weeks for repeat examination and xray .

## 2022-08-01 LAB
ANAEROBIC CULTURE: NORMAL
CULTURE SURGICAL: NORMAL

## 2023-02-24 NOTE — PROGRESS NOTES
Name_______________________________________Printed:____________________  Date and time of surgery________________________Arrival Time:________________   1. The instructions given regarding when and if a patient needs to stop oral intake prior to surgery varies. Follow the specific instructions you were given                  _x__Nothing to eat or to drink after Midnight the night before.                   ____Carbo loading or instructions will be given to select patients-if you have been given those instructions -please do the following                           The evening before your surgery after dinner before midnight drink 40 ounces of gatorade. If you are diabetic use sugar free. The morning of surgery drink 40 ounces of water. This needs to be finished 3 hours prior to your surgery start time. 2. Take the following pills with a small sip of water on the morning of surgery___________________________________________________                  Do not take blood pressure medications ending in pril or sartan the rodger prior to surgery or the morning of surgery. Dr Jackelyn Putnam patient are not to take any medications the AM of surgery. 3. Aspirin, Ibuprofen, Advil, Naproxen, Vitamin E and other Anti-inflammatory products and supplements should be stopped for 5 -7days before surgery or as directed by your physician. 4. Check with your Doctor regarding stopping Plavix, Coumadin,Eliquis, Lovenox,Effient,Pradaxa,Xarelto, Fragmin or other blood thinners and follow their instructions. 5. Do not smoke, and do not drink any alcoholic beverages 24 hours prior to surgery. This includes NA Beer. Refrain from the usage of any recreational drugs. 6. You may brush your teeth and gargle the morning of surgery. DO NOT SWALLOW WATER   7. You MUST make arrangements for a responsible adult to stay on site while you are here and take you home after your surgery. You will not be allowed to leave alone or drive yourself home. It is strongly suggested someone stay with you the first 24 hrs. Your surgery will be cancelled if you do not have a ride home. 8. A parent/legal guardian must accompany a child scheduled for surgery and plan to stay at the hospital until the child is discharged. Please do not bring other children with you. 9. Please wear simple, loose fitting clothing to the hospital.  Alondra Mitchell not bring valuables (money, credit cards, checkbooks, etc.) Do not wear any makeup (including no eye makeup) or nail polish on your fingers or toes. 10. DO NOT wear any jewelry or piercings on day of surgery. All body piercing jewelry must be removed. 11. If you have ___dentures, they will be removed before going to the OR; we will provide you a container. If you wear ___contact lenses or ___glasses, they will be removed; please bring a case for them. 12. Please see your family doctor/pediatrician for a history & physical and/or concerning medications. Bring any test results/reports from your physician's office. PCP__________________Phone___________H&P Appt. Date________             13 If you  have a Living Will and Durable Power of  for Healthcare, please bring in a copy. 15. Notify your Surgeon if you develop any illness between now and surgery  time, cough, cold, fever, sore throat, nausea, vomiting, etc.  Please notify your surgeon if you experience dizziness, shortness of breath or blurred vision between now & the time of your surgery             15. DO NOT shave your operative site 96 hours prior to surgery. For face & neck surgery, men may use an electric razor 48 hours prior to surgery. 16. Shower the night before or morning of surgery using an antibacterial soap or as you have been instructed. 17. To provide excellent care visitors will be limited to one in the room at any given time. 18.  Please bring picture ID and insurance card. 19.  Visit our web site for additional information:  [email or web address]              20.During flu season no children under the age of 15 are permitted in the hospital for the safety of all patients. 21. If you take a long acting insulin in the evening only  take half of your usual  dose the night  before your procedure              22. If you use a c-pap please bring DOS if staying overnight,             23.For your convenience [facility]  has a pharmacy on site to fill your prescriptions. 24. If you use oxygen and have a portable tank please bring it  with you the DOS             25. Bring a complete list of all your medications with name and dose include any supplements. 26. Other__________________________________________   *Please call pre admission testing if you any further questions   Ranjith Castaneda   [address] [place] [address]. [facility]  [phone]   [address] [place] [address]       VISITOR POLICY(subject to change)    Current policy is 2 visitors per patient. No children. Mask is  at the discretion of the facility. Visiting hours are 8a-8p. Overnight visitors will be at the discretion of the nurse. All policies subject to change. All above information reviewed with patient in person or by phone. Patient verbalizes understanding. All questions and concerns addressed.                                                                                                  Patient/Rep__patients wife__________________                                                                                                                                    PRE OP INSTRUCTIONS

## 2023-02-27 ENCOUNTER — HOSPITAL ENCOUNTER (OUTPATIENT)
Age: 83
Discharge: HOME | End: 2023-02-27
Payer: MEDICARE

## 2023-02-27 DIAGNOSIS — Z01.818 PREOP TESTING: ICD-10-CM

## 2023-02-27 LAB
A/G RATIO: 1.2 (ref 1.1–2.2)
ABO/RH: NORMAL
ALBUMIN SERPL-MCNC: 3.6 G/DL (ref 3.4–5)
ALP BLD-CCNC: 117 U/L (ref 40–129)
ALT SERPL-CCNC: 44 U/L (ref 10–40)
ANION GAP SERPL CALCULATED.3IONS-SCNC: 13 MMOL/L (ref 3–16)
ANTIBODY SCREEN: NORMAL
APTT: 40 SEC (ref 23–34.3)
AST SERPL-CCNC: 77 U/L (ref 15–37)
BASOPHILS ABSOLUTE: 0 K/UL (ref 0–0.2)
BASOPHILS RELATIVE PERCENT: 0.8 %
BILIRUB SERPL-MCNC: 0.3 MG/DL (ref 0–1)
BUN BLDV-MCNC: 44 MG/DL (ref 7–20)
CALCIUM SERPL-MCNC: 9.2 MG/DL (ref 8.3–10.6)
CHLORIDE BLD-SCNC: 99 MMOL/L (ref 99–110)
CO2: 26 MMOL/L (ref 21–32)
CREAT SERPL-MCNC: 2 MG/DL (ref 0.8–1.3)
EKG ATRIAL RATE: 61 BPM
EKG DIAGNOSIS: NORMAL
EKG P AXIS: -13 DEGREES
EKG P-R INTERVAL: 80 MS
EKG Q-T INTERVAL: 452 MS
EKG QRS DURATION: 128 MS
EKG QTC CALCULATION (BAZETT): 455 MS
EKG R AXIS: -36 DEGREES
EKG T AXIS: -51 DEGREES
EKG VENTRICULAR RATE: 61 BPM
EOSINOPHILS ABSOLUTE: 0 K/UL (ref 0–0.6)
EOSINOPHILS RELATIVE PERCENT: 0.9 %
GFR SERPL CREATININE-BSD FRML MDRD: 32 ML/MIN/{1.73_M2}
GLUCOSE BLD-MCNC: 180 MG/DL (ref 70–99)
HCT VFR BLD CALC: 31 % (ref 40.5–52.5)
HEMOGLOBIN: 10.2 G/DL (ref 13.5–17.5)
INR BLD: 1.3 (ref 0.87–1.14)
LYMPHOCYTES ABSOLUTE: 1.7 K/UL (ref 1–5.1)
LYMPHOCYTES RELATIVE PERCENT: 30.6 %
MCH RBC QN AUTO: 33.8 PG (ref 26–34)
MCHC RBC AUTO-ENTMCNC: 32.9 G/DL (ref 31–36)
MCV RBC AUTO: 102.6 FL (ref 80–100)
MONOCYTES ABSOLUTE: 0.2 K/UL (ref 0–1.3)
MONOCYTES RELATIVE PERCENT: 3.8 %
NEUTROPHILS ABSOLUTE: 3.5 K/UL (ref 1.7–7.7)
NEUTROPHILS RELATIVE PERCENT: 63.9 %
PDW BLD-RTO: 20.3 % (ref 12.4–15.4)
PLATELET # BLD: 192 K/UL (ref 135–450)
PMV BLD AUTO: 9.2 FL (ref 5–10.5)
POTASSIUM SERPL-SCNC: 4.7 MMOL/L (ref 3.5–5.1)
PROTHROMBIN TIME: 16.2 SEC (ref 11.7–14.5)
RBC # BLD: 3.02 M/UL (ref 4.2–5.9)
SODIUM BLD-SCNC: 138 MMOL/L (ref 136–145)
TOTAL PROTEIN: 6.7 G/DL (ref 6.4–8.2)
WBC # BLD: 5.4 K/UL (ref 4–11)

## 2023-02-27 PROCEDURE — 80053 COMPREHEN METABOLIC PANEL: CPT

## 2023-02-27 PROCEDURE — 87081 CULTURE SCREEN ONLY: CPT

## 2023-02-27 PROCEDURE — 85025 COMPLETE CBC W/AUTO DIFF WBC: CPT

## 2023-02-27 PROCEDURE — 93010 ELECTROCARDIOGRAM REPORT: CPT | Performed by: INTERNAL MEDICINE

## 2023-02-27 PROCEDURE — 85730 THROMBOPLASTIN TIME PARTIAL: CPT

## 2023-02-27 PROCEDURE — 86901 BLOOD TYPING SEROLOGIC RH(D): CPT

## 2023-02-27 PROCEDURE — 86900 BLOOD TYPING SEROLOGIC ABO: CPT

## 2023-02-27 PROCEDURE — 83036 HEMOGLOBIN GLYCOSYLATED A1C: CPT

## 2023-02-27 PROCEDURE — 93005 ELECTROCARDIOGRAM TRACING: CPT | Performed by: ORTHOPAEDIC SURGERY

## 2023-02-27 PROCEDURE — 86850 RBC ANTIBODY SCREEN: CPT

## 2023-02-27 PROCEDURE — 85610 PROTHROMBIN TIME: CPT

## 2023-02-27 PROCEDURE — 36415 COLL VENOUS BLD VENIPUNCTURE: CPT

## 2023-02-28 LAB
ESTIMATED AVERAGE GLUCOSE: 168.6 MG/DL
HBA1C MFR BLD: 7.5 %

## 2023-03-01 LAB — MRSA CULTURE ONLY: NORMAL

## 2023-03-02 NOTE — DISCHARGE INSTRUCTIONS
[facility] DISCHARGE ORDER SET CAYLA    1. (x   )  Activity instructions for ECF/HOME  Elevate extremity if swelling occurs  ICE to operative site   20 minutes/hour while awake  apply towel over dressing when icing  Continue the exercise program as prescribed by PT/OT   Use walker, crutches or cane with weightbearing instructions as indicated by MD  Do not ambulate without assistance until cleared by PT  Out of bed every hour while awake, ambulating minimum 10 minutes  Ankle pumps bilateral ankles 15x every hour while awake  IS Spirometer every 2 hrs while awake  Drink minimum  Eight  8 oz glasses of water daily       2. ( x  ) Initiate bowel care with the following medications  Colace 100 mg 1 tab by mouth twice daily, continue while on narcotics, hold for loose stools. Call office if you have any difficulties with bowel movements/urinating after surgery        3. ( x  ) Admit s/p       ( x  ) left    (  X ) KNEE I&D , EXT SYNOVECTOMY,Poly exchange       4.  (x) PHYSICAL THERAPY      (  x  ) PT    (  X ) OT    quad sets, ankle pumps, transfers, gait training ADL'S . NO KNEE ROM EXERCISES       ( X  ) Home PT 2 x a week for 2 weeks          5. (x   )  Weight bearing/limitations    (  X ) left    (  X ) lower   extremity                 (  X ) FWB  in knee immobilizer      6.  (   )  Labs  Pt/INR  on Monday and Thursdays for 4 weeks, call results to   [phone]  before 3 pm for coumadin dose    7.( x  ) Dressing/wound care  DO NOT REMOVE ACE WRAP/GAUZE  THIS WILL BE REMOVED AT 2 WEEK POST OP APPT         8. (  x ) DVT PROPHYLAXIS  ( x  ) Thigh/knee hi coco hose right lower extremity, OFF AT NIGHT   CONTINUE TO WEAR UNTIL 3 WEEKS POST OP  (  x ) Resume eliquis POD 1                Call office for refills on above medications if you run out           Call office with any questions  [phone]  Follow up appt  with ortho in 2 weeks  ELECTRONICALLY SIGNED BY: Consuelo Holt MD, Jean Claude Meyers Beatris Garland MD, 03/09/2023             DO MOSER Stiven Lainez is participating in [address] [place] [address] for Joint Replacement (CJR) Abigail Horne is participating in a Medicare initiative called the [address] [place] [address] for Joint Replacement (CJR) model. Medicare designed this model to encourage higher quality care and greater financial accountability from hospitals when Medicare beneficiaries receive lower-extremity joint replacement procedures ([place]), typically hip or knee replacements. [address] [place] [address] participation in the [address] [place] [address],[address] [facility] should not restrict your access to care for your medical condition or your freedom to choose your health care providers and services. All existing Medicare beneficiary protections continue to be available to you. The CJR model aims to help give you better care. The CJR model aims to support better and more efficient care for beneficiaries undergoing LEJR procedures. A CJR episode of care is typically defined as an admission of an eligible Medicare beneficiary to a hospital participating in the [address] [place] [address] for an Texie [place] procedure. The LEJR procedure can take place in either an inpatient or outpatient setting and will still be considered a CJR episode of care. The CJR episode of care continues for 90 days following discharge. This model uses episode payment and quality measurement for an episode of care associated with LEJR procedures to encourage hospitals, physicians, and post-acute care providers to work together to improve the quality and coordination of care from the initial hospitalization through recovery. Under the [address] [place] [address],[address] [facility], [address] [place] [address] [address] can earn additional payments from Medicare if we meet the high quality goals set by Medicare, while keeping hospital costs and care spending under control.  If we dont meet these quality and cost goals, we may have to repay Medicare. Medicare is using the CJR model to encourage [facility] to work more closely with your doctors and other health care providers that help patients recover after discharge from the hospital including, but not limited to, nursing homes (skilled nursing facilities), home health agencies, inpatient rehabilitation facilities, and long- term care hospitals. If you require a stay in a [facility] (SNF) to assist with your recovery from surgery and if, and only if, it is clinically appropriate, the CJR model permits [facility] to discharge you to a high quality SNF sooner than the three days Medicare usually requires to cover a SNF stay. Medicare will monitor your care to ensure you and others are receiving high quality care. It's your choice which hospital, doctor, or other providers you use. You have the right to choose which hospital, doctor, or other post-hospital stay health care provider you use. If you believe that your care is adversely affected or have concerns about substandard care, you may call 1-800-MEDICARE or contact your [facility] Quality Improvement Organization by going to: Unique.casandra. To find a different doctor, visit [address] Physician Compare website, [employer], or call 1-800-MEDICARE (9-443.495.1104). TTY users should call 8-707.931.2180. To find a different hospital, visit [employer].be, or  call 1-800-MEDICARE (1- 625.875.6737). TTY users should call  5-998.935.7171. To find a different skilled nursing facility, visit [address] website, [email or web address]/, or call  1-800-MEDICARE (8-847.922.4684). TTY users should call 8-184-153- 1283.     To find a different home health agency, visit [address] [place] [address] website, [employer]uk, or call 1-800-MEDICARE (1-878.383.5291). TTY users should call 9-038-310- 4028. For an explanation of how patients can access their health care records and beneficiary claims data, please visit [email or web address]- [email or web address]-button/about-[email or web address]-button    Get more information     If you have questions or want more information about the [employer] for Joint Replacement (CJR) model, call [facility] at [phone] or call 1-800-MEDICARE. You can also find additional information at https://innovation.cms.gov/initiatives/cjr.

## 2023-03-08 ENCOUNTER — ANESTHESIA EVENT (OUTPATIENT)
Dept: OPERATING ROOM | Age: 83
End: 2023-03-08
Payer: MEDICARE

## 2023-03-09 ENCOUNTER — APPOINTMENT (OUTPATIENT)
Dept: GENERAL RADIOLOGY | Age: 83
DRG: 486 | End: 2023-03-09
Attending: ORTHOPAEDIC SURGERY
Payer: MEDICARE

## 2023-03-09 ENCOUNTER — ANESTHESIA (OUTPATIENT)
Dept: OPERATING ROOM | Age: 83
End: 2023-03-09
Payer: MEDICARE

## 2023-03-09 ENCOUNTER — HOSPITAL ENCOUNTER (INPATIENT)
Age: 83
LOS: 4 days | Discharge: HOME HEALTH | DRG: 486 | End: 2023-03-13
Attending: ORTHOPAEDIC SURGERY | Admitting: ORTHOPAEDIC SURGERY
Payer: MEDICARE

## 2023-03-09 DIAGNOSIS — Z01.818 PREOP TESTING: Primary | ICD-10-CM

## 2023-03-09 DIAGNOSIS — T84.093D FAILED TOTAL KNEE, LEFT, SUBSEQUENT ENCOUNTER: ICD-10-CM

## 2023-03-09 PROBLEM — T84.54XA INFECTION OF PROSTHETIC LEFT KNEE JOINT (HCC): Status: ACTIVE | Noted: 2023-03-09

## 2023-03-09 PROBLEM — I25.10 CORONARY ARTERY DISEASE DUE TO LIPID RICH PLAQUE: Status: ACTIVE | Noted: 2023-03-09

## 2023-03-09 PROBLEM — E78.2 MIXED HYPERLIPIDEMIA: Status: ACTIVE | Noted: 2023-03-09

## 2023-03-09 PROBLEM — I25.83 CORONARY ARTERY DISEASE DUE TO LIPID RICH PLAQUE: Status: ACTIVE | Noted: 2023-03-09

## 2023-03-09 PROBLEM — M01.X62: Status: ACTIVE | Noted: 2023-03-09

## 2023-03-09 PROBLEM — L03.119 RECURRENT CELLULITIS OF LOWER EXTREMITY: Status: ACTIVE | Noted: 2023-03-09

## 2023-03-09 PROBLEM — E11.69 DIABETES MELLITUS TYPE 2 IN OBESE (HCC): Status: ACTIVE | Noted: 2022-07-14

## 2023-03-09 PROBLEM — E66.9 DIABETES MELLITUS TYPE 2 IN OBESE (HCC): Status: ACTIVE | Noted: 2022-07-14

## 2023-03-09 PROBLEM — Z86.79 HISTORY OF ATRIAL FIBRILLATION: Status: ACTIVE | Noted: 2023-03-09

## 2023-03-09 PROBLEM — E66.09 CLASS 2 OBESITY DUE TO EXCESS CALORIES WITH BODY MASS INDEX (BMI) OF 36.0 TO 36.9 IN ADULT: Status: ACTIVE | Noted: 2023-03-09

## 2023-03-09 LAB
A/G RATIO: 1 (ref 1.1–2.2)
ABO/RH: NORMAL
ALBUMIN SERPL-MCNC: 3.4 G/DL (ref 3.4–5)
ALP BLD-CCNC: 108 U/L (ref 40–129)
ALT SERPL-CCNC: 58 U/L (ref 10–40)
ANION GAP SERPL CALCULATED.3IONS-SCNC: 9 MMOL/L (ref 3–16)
ANTIBODY SCREEN: NORMAL
APPEARANCE FLUID: NORMAL
APTT: 35.8 SEC (ref 23–34.3)
AST SERPL-CCNC: 92 U/L (ref 15–37)
BASOPHILS ABSOLUTE: 0 K/UL (ref 0–0.2)
BASOPHILS RELATIVE PERCENT: 0.7 %
BILIRUB SERPL-MCNC: <0.2 MG/DL (ref 0–1)
BUN BLDV-MCNC: 30 MG/DL (ref 7–20)
C-REACTIVE PROTEIN: 12.5 MG/L (ref 0–5.1)
CALCIUM SERPL-MCNC: 8.5 MG/DL (ref 8.3–10.6)
CELL COUNT FLUID TYPE: NORMAL
CHLORIDE BLD-SCNC: 103 MMOL/L (ref 99–110)
CLOT EVALUATION: NORMAL
CO2: 28 MMOL/L (ref 21–32)
COLOR FLUID: NORMAL
CREAT SERPL-MCNC: 1.4 MG/DL (ref 0.8–1.3)
EOSINOPHILS ABSOLUTE: 0 K/UL (ref 0–0.6)
EOSINOPHILS RELATIVE PERCENT: 0.5 %
GFR SERPL CREATININE-BSD FRML MDRD: 50 ML/MIN/{1.73_M2}
GLUCOSE BLD-MCNC: 106 MG/DL (ref 70–99)
GLUCOSE BLD-MCNC: 119 MG/DL (ref 70–99)
GLUCOSE BLD-MCNC: 211 MG/DL (ref 70–99)
HCT VFR BLD CALC: 30.2 % (ref 40.5–52.5)
HEMOGLOBIN: 10 G/DL (ref 13.5–17.5)
INR BLD: 1.13 (ref 0.87–1.14)
LYMPHOCYTES ABSOLUTE: 0.7 K/UL (ref 1–5.1)
LYMPHOCYTES RELATIVE PERCENT: 20.9 %
LYMPHOCYTES, BODY FLUID: 3 %
MCH RBC QN AUTO: 34.3 PG (ref 26–34)
MCHC RBC AUTO-ENTMCNC: 33 G/DL (ref 31–36)
MCV RBC AUTO: 104 FL (ref 80–100)
MONOCYTES ABSOLUTE: 0.1 K/UL (ref 0–1.3)
MONOCYTES RELATIVE PERCENT: 1.7 %
NEUTROPHIL, FLUID: 97 %
NEUTROPHILS ABSOLUTE: 2.6 K/UL (ref 1.7–7.7)
NEUTROPHILS RELATIVE PERCENT: 76.2 %
NUCLEATED CELLS FLUID: NORMAL /CUMM
NUMBER OF CELLS COUNTED FLUID: 100
PDW BLD-RTO: 21 % (ref 12.4–15.4)
PERFORMED ON: ABNORMAL
PERFORMED ON: ABNORMAL
PLATELET # BLD: 184 K/UL (ref 135–450)
PMV BLD AUTO: 8.4 FL (ref 5–10.5)
POTASSIUM SERPL-SCNC: 4.8 MMOL/L (ref 3.5–5.1)
PROTHROMBIN TIME: 14.4 SEC (ref 11.7–14.5)
RBC # BLD: 2.9 M/UL (ref 4.2–5.9)
RBC FLUID: NORMAL /CUMM
SEDIMENTATION RATE, ERYTHROCYTE: 28 MM/HR (ref 0–20)
SODIUM BLD-SCNC: 140 MMOL/L (ref 136–145)
TOTAL CK: 1061 U/L (ref 39–308)
TOTAL PROTEIN: 6.8 G/DL (ref 6.4–8.2)
WBC # BLD: 3.4 K/UL (ref 4–11)

## 2023-03-09 PROCEDURE — 3600000004 HC SURGERY LEVEL 4 BASE: Performed by: ORTHOPAEDIC SURGERY

## 2023-03-09 PROCEDURE — 1200000000 HC SEMI PRIVATE

## 2023-03-09 PROCEDURE — 6360000002 HC RX W HCPCS: Performed by: ORTHOPAEDIC SURGERY

## 2023-03-09 PROCEDURE — 87116 MYCOBACTERIA CULTURE: CPT

## 2023-03-09 PROCEDURE — 36415 COLL VENOUS BLD VENIPUNCTURE: CPT

## 2023-03-09 PROCEDURE — 87206 SMEAR FLUORESCENT/ACID STAI: CPT

## 2023-03-09 PROCEDURE — 2500000003 HC RX 250 WO HCPCS: Performed by: NURSE ANESTHETIST, CERTIFIED REGISTERED

## 2023-03-09 PROCEDURE — 87070 CULTURE OTHR SPECIMN AEROBIC: CPT

## 2023-03-09 PROCEDURE — 73560 X-RAY EXAM OF KNEE 1 OR 2: CPT

## 2023-03-09 PROCEDURE — 3700000001 HC ADD 15 MINUTES (ANESTHESIA): Performed by: ORTHOPAEDIC SURGERY

## 2023-03-09 PROCEDURE — 85025 COMPLETE CBC W/AUTO DIFF WBC: CPT

## 2023-03-09 PROCEDURE — 3E1U38X IRRIGATION OF JOINTS USING IRRIGATING SUBSTANCE, PERCUTANEOUS APPROACH, DIAGNOSTIC: ICD-10-PCS | Performed by: ORTHOPAEDIC SURGERY

## 2023-03-09 PROCEDURE — 0SPD09Z REMOVAL OF LINER FROM LEFT KNEE JOINT, OPEN APPROACH: ICD-10-PCS | Performed by: ORTHOPAEDIC SURGERY

## 2023-03-09 PROCEDURE — C1776 JOINT DEVICE (IMPLANTABLE): HCPCS | Performed by: ORTHOPAEDIC SURGERY

## 2023-03-09 PROCEDURE — 87205 SMEAR GRAM STAIN: CPT

## 2023-03-09 PROCEDURE — 2580000003 HC RX 258: Performed by: ORTHOPAEDIC SURGERY

## 2023-03-09 PROCEDURE — 85730 THROMBOPLASTIN TIME PARTIAL: CPT

## 2023-03-09 PROCEDURE — 86140 C-REACTIVE PROTEIN: CPT

## 2023-03-09 PROCEDURE — 7100000000 HC PACU RECOVERY - FIRST 15 MIN: Performed by: ORTHOPAEDIC SURGERY

## 2023-03-09 PROCEDURE — 85652 RBC SED RATE AUTOMATED: CPT

## 2023-03-09 PROCEDURE — 0SUW09Z SUPPLEMENT LEFT KNEE JOINT, TIBIAL SURFACE WITH LINER, OPEN APPROACH: ICD-10-PCS | Performed by: ORTHOPAEDIC SURGERY

## 2023-03-09 PROCEDURE — 6360000002 HC RX W HCPCS: Performed by: NURSE ANESTHETIST, CERTIFIED REGISTERED

## 2023-03-09 PROCEDURE — 86901 BLOOD TYPING SEROLOGIC RH(D): CPT

## 2023-03-09 PROCEDURE — 89051 BODY FLUID CELL COUNT: CPT

## 2023-03-09 PROCEDURE — 6360000002 HC RX W HCPCS: Performed by: ANESTHESIOLOGY

## 2023-03-09 PROCEDURE — 2500000003 HC RX 250 WO HCPCS: Performed by: ORTHOPAEDIC SURGERY

## 2023-03-09 PROCEDURE — 80053 COMPREHEN METABOLIC PANEL: CPT

## 2023-03-09 PROCEDURE — 7100000001 HC PACU RECOVERY - ADDTL 15 MIN: Performed by: ORTHOPAEDIC SURGERY

## 2023-03-09 PROCEDURE — 87075 CULTR BACTERIA EXCEPT BLOOD: CPT

## 2023-03-09 PROCEDURE — 3700000000 HC ANESTHESIA ATTENDED CARE: Performed by: ORTHOPAEDIC SURGERY

## 2023-03-09 PROCEDURE — 2709999900 HC NON-CHARGEABLE SUPPLY: Performed by: ORTHOPAEDIC SURGERY

## 2023-03-09 PROCEDURE — 85610 PROTHROMBIN TIME: CPT

## 2023-03-09 PROCEDURE — 86850 RBC ANTIBODY SCREEN: CPT

## 2023-03-09 PROCEDURE — 3E0T3BZ INTRODUCTION OF ANESTHETIC AGENT INTO PERIPHERAL NERVES AND PLEXI, PERCUTANEOUS APPROACH: ICD-10-PCS | Performed by: ANESTHESIOLOGY

## 2023-03-09 PROCEDURE — 99223 1ST HOSP IP/OBS HIGH 75: CPT | Performed by: INTERNAL MEDICINE

## 2023-03-09 PROCEDURE — 3600000014 HC SURGERY LEVEL 4 ADDTL 15MIN: Performed by: ORTHOPAEDIC SURGERY

## 2023-03-09 PROCEDURE — 87102 FUNGUS ISOLATION CULTURE: CPT

## 2023-03-09 PROCEDURE — 6360000002 HC RX W HCPCS: Performed by: INTERNAL MEDICINE

## 2023-03-09 PROCEDURE — 64447 NJX AA&/STRD FEMORAL NRV IMG: CPT | Performed by: ANESTHESIOLOGY

## 2023-03-09 PROCEDURE — 2580000003 HC RX 258: Performed by: INTERNAL MEDICINE

## 2023-03-09 PROCEDURE — 6370000000 HC RX 637 (ALT 250 FOR IP): Performed by: ORTHOPAEDIC SURGERY

## 2023-03-09 PROCEDURE — C1713 ANCHOR/SCREW BN/BN,TIS/BN: HCPCS | Performed by: ORTHOPAEDIC SURGERY

## 2023-03-09 PROCEDURE — 86900 BLOOD TYPING SEROLOGIC ABO: CPT

## 2023-03-09 PROCEDURE — 82550 ASSAY OF CK (CPK): CPT

## 2023-03-09 DEVICE — IMPLANTABLE DEVICE: Type: IMPLANTABLE DEVICE | Site: KNEE | Status: FUNCTIONAL

## 2023-03-09 RX ORDER — SODIUM CHLORIDE 9 MG/ML
INJECTION, SOLUTION INTRAVENOUS CONTINUOUS
Status: DISCONTINUED | OUTPATIENT
Start: 2023-03-09 | End: 2023-03-09 | Stop reason: HOSPADM

## 2023-03-09 RX ORDER — MAGNESIUM HYDROXIDE 1200 MG/15ML
LIQUID ORAL CONTINUOUS PRN
Status: COMPLETED | OUTPATIENT
Start: 2023-03-09 | End: 2023-03-09

## 2023-03-09 RX ORDER — LEVOTHYROXINE SODIUM 0.12 MG/1
125 TABLET ORAL DAILY
Status: DISCONTINUED | OUTPATIENT
Start: 2023-03-10 | End: 2023-03-13 | Stop reason: HOSPADM

## 2023-03-09 RX ORDER — GABAPENTIN 300 MG/1
300 CAPSULE ORAL 4 TIMES DAILY
Status: DISCONTINUED | OUTPATIENT
Start: 2023-03-09 | End: 2023-03-13 | Stop reason: HOSPADM

## 2023-03-09 RX ORDER — SODIUM CHLORIDE 0.9 % (FLUSH) 0.9 %
5-40 SYRINGE (ML) INJECTION PRN
Status: DISCONTINUED | OUTPATIENT
Start: 2023-03-09 | End: 2023-03-13 | Stop reason: HOSPADM

## 2023-03-09 RX ORDER — PANTOPRAZOLE SODIUM 40 MG/1
40 TABLET, DELAYED RELEASE ORAL
Status: DISCONTINUED | OUTPATIENT
Start: 2023-03-10 | End: 2023-03-13 | Stop reason: HOSPADM

## 2023-03-09 RX ORDER — POTASSIUM CHLORIDE 7.45 MG/ML
10 INJECTION INTRAVENOUS PRN
Status: DISCONTINUED | OUTPATIENT
Start: 2023-03-09 | End: 2023-03-13 | Stop reason: HOSPADM

## 2023-03-09 RX ORDER — HYDRALAZINE HYDROCHLORIDE 20 MG/ML
10 INJECTION INTRAMUSCULAR; INTRAVENOUS EVERY 6 HOURS PRN
Status: DISCONTINUED | OUTPATIENT
Start: 2023-03-09 | End: 2023-03-13 | Stop reason: HOSPADM

## 2023-03-09 RX ORDER — MIDAZOLAM HYDROCHLORIDE 2 MG/2ML
2 INJECTION, SOLUTION INTRAMUSCULAR; INTRAVENOUS ONCE
Status: DISCONTINUED | OUTPATIENT
Start: 2023-03-09 | End: 2023-03-13 | Stop reason: HOSPADM

## 2023-03-09 RX ORDER — LIDOCAINE HYDROCHLORIDE 10 MG/ML
0.5 INJECTION, SOLUTION EPIDURAL; INFILTRATION; INTRACAUDAL; PERINEURAL ONCE
Status: DISCONTINUED | OUTPATIENT
Start: 2023-03-09 | End: 2023-03-09 | Stop reason: HOSPADM

## 2023-03-09 RX ORDER — HYDROMORPHONE HYDROCHLORIDE 1 MG/ML
0.25 INJECTION, SOLUTION INTRAMUSCULAR; INTRAVENOUS; SUBCUTANEOUS
Status: DISCONTINUED | OUTPATIENT
Start: 2023-03-09 | End: 2023-03-13 | Stop reason: HOSPADM

## 2023-03-09 RX ORDER — LOSARTAN POTASSIUM 25 MG/1
50 TABLET ORAL DAILY
Status: DISCONTINUED | OUTPATIENT
Start: 2023-03-09 | End: 2023-03-13 | Stop reason: HOSPADM

## 2023-03-09 RX ORDER — HYDROMORPHONE HCL 110MG/55ML
0.5 PATIENT CONTROLLED ANALGESIA SYRINGE INTRAVENOUS EVERY 5 MIN PRN
Status: DISCONTINUED | OUTPATIENT
Start: 2023-03-09 | End: 2023-03-09 | Stop reason: HOSPADM

## 2023-03-09 RX ORDER — MAGNESIUM HYDROXIDE 1200 MG/15ML
LIQUID ORAL
Status: COMPLETED | OUTPATIENT
Start: 2023-03-09 | End: 2023-03-09

## 2023-03-09 RX ORDER — ROCURONIUM BROMIDE 10 MG/ML
INJECTION, SOLUTION INTRAVENOUS PRN
Status: DISCONTINUED | OUTPATIENT
Start: 2023-03-09 | End: 2023-03-09 | Stop reason: SDUPTHER

## 2023-03-09 RX ORDER — SODIUM CHLORIDE 9 MG/ML
INJECTION, SOLUTION INTRAVENOUS PRN
Status: DISCONTINUED | OUTPATIENT
Start: 2023-03-09 | End: 2023-03-13 | Stop reason: HOSPADM

## 2023-03-09 RX ORDER — AMLODIPINE BESYLATE 5 MG/1
2.5 TABLET ORAL NIGHTLY
Status: DISCONTINUED | OUTPATIENT
Start: 2023-03-09 | End: 2023-03-13 | Stop reason: HOSPADM

## 2023-03-09 RX ORDER — FAMOTIDINE 10 MG/ML
INJECTION, SOLUTION INTRAVENOUS PRN
Status: DISCONTINUED | OUTPATIENT
Start: 2023-03-09 | End: 2023-03-09 | Stop reason: SDUPTHER

## 2023-03-09 RX ORDER — TORSEMIDE 20 MG/1
10 TABLET ORAL DAILY
Status: DISCONTINUED | OUTPATIENT
Start: 2023-03-09 | End: 2023-03-13 | Stop reason: HOSPADM

## 2023-03-09 RX ORDER — GLYCOPYRROLATE 0.2 MG/ML
INJECTION INTRAMUSCULAR; INTRAVENOUS PRN
Status: DISCONTINUED | OUTPATIENT
Start: 2023-03-09 | End: 2023-03-09 | Stop reason: SDUPTHER

## 2023-03-09 RX ORDER — OXYCODONE HYDROCHLORIDE 5 MG/1
5 TABLET ORAL EVERY 6 HOURS PRN
Qty: 28 TABLET | Refills: 0 | Status: SHIPPED | OUTPATIENT
Start: 2023-03-09 | End: 2023-03-16

## 2023-03-09 RX ORDER — BISACODYL 10 MG
10 SUPPOSITORY, RECTAL RECTAL DAILY PRN
Status: DISCONTINUED | OUTPATIENT
Start: 2023-03-09 | End: 2023-03-13 | Stop reason: HOSPADM

## 2023-03-09 RX ORDER — SODIUM CHLORIDE 0.9 % (FLUSH) 0.9 %
5-40 SYRINGE (ML) INJECTION EVERY 12 HOURS SCHEDULED
Status: DISCONTINUED | OUTPATIENT
Start: 2023-03-09 | End: 2023-03-13 | Stop reason: HOSPADM

## 2023-03-09 RX ORDER — ASPIRIN 81 MG/1
81 TABLET ORAL DAILY
Status: DISCONTINUED | OUTPATIENT
Start: 2023-03-09 | End: 2023-03-13 | Stop reason: HOSPADM

## 2023-03-09 RX ORDER — DEXTROSE MONOHYDRATE 100 MG/ML
INJECTION, SOLUTION INTRAVENOUS CONTINUOUS PRN
Status: DISCONTINUED | OUTPATIENT
Start: 2023-03-09 | End: 2023-03-13 | Stop reason: HOSPADM

## 2023-03-09 RX ORDER — MEPERIDINE HYDROCHLORIDE 25 MG/ML
12.5 INJECTION INTRAMUSCULAR; INTRAVENOUS; SUBCUTANEOUS EVERY 5 MIN PRN
Status: DISCONTINUED | OUTPATIENT
Start: 2023-03-09 | End: 2023-03-09 | Stop reason: HOSPADM

## 2023-03-09 RX ORDER — MAGNESIUM SULFATE HEPTAHYDRATE 500 MG/ML
INJECTION, SOLUTION INTRAMUSCULAR; INTRAVENOUS PRN
Status: DISCONTINUED | OUTPATIENT
Start: 2023-03-09 | End: 2023-03-09 | Stop reason: SDUPTHER

## 2023-03-09 RX ORDER — SODIUM CHLORIDE 9 MG/ML
INJECTION, SOLUTION INTRAVENOUS PRN
Status: DISCONTINUED | OUTPATIENT
Start: 2023-03-09 | End: 2023-03-09 | Stop reason: HOSPADM

## 2023-03-09 RX ORDER — PROPOFOL 10 MG/ML
INJECTION, EMULSION INTRAVENOUS PRN
Status: DISCONTINUED | OUTPATIENT
Start: 2023-03-09 | End: 2023-03-09 | Stop reason: SDUPTHER

## 2023-03-09 RX ORDER — FENTANYL CITRATE 50 UG/ML
INJECTION, SOLUTION INTRAMUSCULAR; INTRAVENOUS PRN
Status: DISCONTINUED | OUTPATIENT
Start: 2023-03-09 | End: 2023-03-09 | Stop reason: SDUPTHER

## 2023-03-09 RX ORDER — 0.9 % SODIUM CHLORIDE 0.9 %
500 INTRAVENOUS SOLUTION INTRAVENOUS PRN
Status: DISCONTINUED | OUTPATIENT
Start: 2023-03-09 | End: 2023-03-13 | Stop reason: HOSPADM

## 2023-03-09 RX ORDER — MIDAZOLAM HYDROCHLORIDE 2 MG/2ML
2 INJECTION, SOLUTION INTRAMUSCULAR; INTRAVENOUS ONCE
Status: COMPLETED | OUTPATIENT
Start: 2023-03-09 | End: 2023-03-09

## 2023-03-09 RX ORDER — ALOGLIPTIN 12.5 MG/1
12.5 TABLET, FILM COATED ORAL DAILY
Status: DISCONTINUED | OUTPATIENT
Start: 2023-03-10 | End: 2023-03-13 | Stop reason: HOSPADM

## 2023-03-09 RX ORDER — FUROSEMIDE 10 MG/ML
20 INJECTION INTRAMUSCULAR; INTRAVENOUS ONCE
Status: COMPLETED | OUTPATIENT
Start: 2023-03-09 | End: 2023-03-09

## 2023-03-09 RX ORDER — POTASSIUM CHLORIDE 20 MEQ/1
40 TABLET, EXTENDED RELEASE ORAL PRN
Status: DISCONTINUED | OUTPATIENT
Start: 2023-03-09 | End: 2023-03-13 | Stop reason: HOSPADM

## 2023-03-09 RX ORDER — SUCCINYLCHOLINE CHLORIDE 20 MG/ML
INJECTION INTRAMUSCULAR; INTRAVENOUS PRN
Status: DISCONTINUED | OUTPATIENT
Start: 2023-03-09 | End: 2023-03-09 | Stop reason: SDUPTHER

## 2023-03-09 RX ORDER — ACETAMINOPHEN 500 MG
1000 TABLET ORAL EVERY 8 HOURS SCHEDULED
Status: DISCONTINUED | OUTPATIENT
Start: 2023-03-09 | End: 2023-03-13 | Stop reason: HOSPADM

## 2023-03-09 RX ORDER — ROPIVACAINE HYDROCHLORIDE 5 MG/ML
INJECTION, SOLUTION EPIDURAL; INFILTRATION; PERINEURAL
Status: COMPLETED | OUTPATIENT
Start: 2023-03-09 | End: 2023-03-09

## 2023-03-09 RX ORDER — KETAMINE HYDROCHLORIDE 10 MG/ML
INJECTION INTRAMUSCULAR; INTRAVENOUS PRN
Status: DISCONTINUED | OUTPATIENT
Start: 2023-03-09 | End: 2023-03-09 | Stop reason: SDUPTHER

## 2023-03-09 RX ORDER — DOCUSATE SODIUM 100 MG/1
100 CAPSULE, LIQUID FILLED ORAL 2 TIMES DAILY
COMMUNITY
Start: 2023-03-09

## 2023-03-09 RX ORDER — DIPHENHYDRAMINE HYDROCHLORIDE 50 MG/ML
12.5 INJECTION INTRAMUSCULAR; INTRAVENOUS
Status: DISCONTINUED | OUTPATIENT
Start: 2023-03-09 | End: 2023-03-09 | Stop reason: HOSPADM

## 2023-03-09 RX ORDER — EPHEDRINE SULFATE/0.9% NACL/PF 50 MG/5 ML
SYRINGE (ML) INTRAVENOUS PRN
Status: DISCONTINUED | OUTPATIENT
Start: 2023-03-09 | End: 2023-03-09 | Stop reason: SDUPTHER

## 2023-03-09 RX ORDER — ATORVASTATIN CALCIUM 40 MG/1
40 TABLET, FILM COATED ORAL NIGHTLY
Status: DISCONTINUED | OUTPATIENT
Start: 2023-03-09 | End: 2023-03-13 | Stop reason: HOSPADM

## 2023-03-09 RX ORDER — SODIUM CHLORIDE 9 MG/ML
INJECTION, SOLUTION INTRAVENOUS CONTINUOUS
Status: DISCONTINUED | OUTPATIENT
Start: 2023-03-09 | End: 2023-03-11

## 2023-03-09 RX ORDER — OXYCODONE HYDROCHLORIDE 5 MG/1
10 TABLET ORAL EVERY 4 HOURS PRN
Status: DISCONTINUED | OUTPATIENT
Start: 2023-03-09 | End: 2023-03-13 | Stop reason: HOSPADM

## 2023-03-09 RX ORDER — ONDANSETRON 2 MG/ML
4 INJECTION INTRAMUSCULAR; INTRAVENOUS
Status: DISCONTINUED | OUTPATIENT
Start: 2023-03-09 | End: 2023-03-09 | Stop reason: HOSPADM

## 2023-03-09 RX ORDER — OXYCODONE HYDROCHLORIDE 5 MG/1
5 TABLET ORAL EVERY 4 HOURS PRN
Status: DISCONTINUED | OUTPATIENT
Start: 2023-03-09 | End: 2023-03-13 | Stop reason: HOSPADM

## 2023-03-09 RX ORDER — HYDROMORPHONE HCL 110MG/55ML
0.25 PATIENT CONTROLLED ANALGESIA SYRINGE INTRAVENOUS EVERY 5 MIN PRN
Status: DISCONTINUED | OUTPATIENT
Start: 2023-03-09 | End: 2023-03-09 | Stop reason: HOSPADM

## 2023-03-09 RX ORDER — VANCOMYCIN HYDROCHLORIDE 1 G/20ML
INJECTION, POWDER, LYOPHILIZED, FOR SOLUTION INTRAVENOUS
Status: COMPLETED | OUTPATIENT
Start: 2023-03-09 | End: 2023-03-09

## 2023-03-09 RX ORDER — LIDOCAINE HYDROCHLORIDE 20 MG/ML
INJECTION, SOLUTION INFILTRATION; PERINEURAL PRN
Status: DISCONTINUED | OUTPATIENT
Start: 2023-03-09 | End: 2023-03-09 | Stop reason: SDUPTHER

## 2023-03-09 RX ORDER — HYDROMORPHONE HYDROCHLORIDE 1 MG/ML
0.5 INJECTION, SOLUTION INTRAMUSCULAR; INTRAVENOUS; SUBCUTANEOUS
Status: DISCONTINUED | OUTPATIENT
Start: 2023-03-09 | End: 2023-03-13 | Stop reason: HOSPADM

## 2023-03-09 RX ORDER — SODIUM CHLORIDE 0.9 % (FLUSH) 0.9 %
5-40 SYRINGE (ML) INJECTION EVERY 12 HOURS SCHEDULED
Status: DISCONTINUED | OUTPATIENT
Start: 2023-03-09 | End: 2023-03-09 | Stop reason: HOSPADM

## 2023-03-09 RX ORDER — INSULIN LISPRO 100 [IU]/ML
0-8 INJECTION, SOLUTION INTRAVENOUS; SUBCUTANEOUS
Status: DISCONTINUED | OUTPATIENT
Start: 2023-03-10 | End: 2023-03-13 | Stop reason: DRUGHIGH

## 2023-03-09 RX ORDER — INSULIN LISPRO 100 [IU]/ML
0-4 INJECTION, SOLUTION INTRAVENOUS; SUBCUTANEOUS NIGHTLY
Status: DISCONTINUED | OUTPATIENT
Start: 2023-03-09 | End: 2023-03-13 | Stop reason: HOSPADM

## 2023-03-09 RX ORDER — SODIUM CHLORIDE 0.9 % (FLUSH) 0.9 %
5-40 SYRINGE (ML) INJECTION PRN
Status: DISCONTINUED | OUTPATIENT
Start: 2023-03-09 | End: 2023-03-09 | Stop reason: HOSPADM

## 2023-03-09 RX ADMIN — ROCURONIUM BROMIDE 10 MG: 10 INJECTION, SOLUTION INTRAVENOUS at 14:35

## 2023-03-09 RX ADMIN — PHENYLEPHRINE HYDROCHLORIDE 100 MCG: 10 INJECTION INTRAVENOUS at 15:05

## 2023-03-09 RX ADMIN — ACETAMINOPHEN 1000 MG: 500 TABLET ORAL at 21:21

## 2023-03-09 RX ADMIN — AMLODIPINE BESYLATE 2.5 MG: 5 TABLET ORAL at 21:21

## 2023-03-09 RX ADMIN — PHENYLEPHRINE HYDROCHLORIDE 100 MCG: 10 INJECTION INTRAVENOUS at 15:45

## 2023-03-09 RX ADMIN — FENTANYL CITRATE 50 MCG: 50 INJECTION, SOLUTION INTRAMUSCULAR; INTRAVENOUS at 14:35

## 2023-03-09 RX ADMIN — TRANEXAMIC ACID 1000 MG: 1 INJECTION, SOLUTION INTRAVENOUS at 15:42

## 2023-03-09 RX ADMIN — PHENYLEPHRINE HYDROCHLORIDE 100 MCG: 10 INJECTION INTRAVENOUS at 14:39

## 2023-03-09 RX ADMIN — PHENYLEPHRINE HYDROCHLORIDE 100 MCG: 10 INJECTION INTRAVENOUS at 15:10

## 2023-03-09 RX ADMIN — KETAMINE HYDROCHLORIDE 10 MG: 10 INJECTION, SOLUTION INTRAMUSCULAR; INTRAVENOUS at 14:45

## 2023-03-09 RX ADMIN — Medication 5 MG: at 15:19

## 2023-03-09 RX ADMIN — SODIUM CHLORIDE: 9 INJECTION, SOLUTION INTRAVENOUS at 19:09

## 2023-03-09 RX ADMIN — LIDOCAINE HYDROCHLORIDE 100 MG: 20 INJECTION, SOLUTION INFILTRATION; PERINEURAL at 14:35

## 2023-03-09 RX ADMIN — LOSARTAN POTASSIUM 50 MG: 25 TABLET, FILM COATED ORAL at 21:20

## 2023-03-09 RX ADMIN — Medication 5 MG: at 15:48

## 2023-03-09 RX ADMIN — PROPOFOL 200 MG: 10 INJECTION, EMULSION INTRAVENOUS at 14:35

## 2023-03-09 RX ADMIN — SUCCINYLCHOLINE CHLORIDE 120 MG: 20 INJECTION, SOLUTION INTRAMUSCULAR; INTRAVENOUS at 14:36

## 2023-03-09 RX ADMIN — Medication 5 MG: at 15:40

## 2023-03-09 RX ADMIN — GLYCOPYRROLATE 0.2 MG: 0.2 INJECTION, SOLUTION INTRAMUSCULAR; INTRAVENOUS at 14:55

## 2023-03-09 RX ADMIN — GLYCOPYRROLATE 0.2 MG: 0.2 INJECTION, SOLUTION INTRAMUSCULAR; INTRAVENOUS at 14:32

## 2023-03-09 RX ADMIN — PHENYLEPHRINE HYDROCHLORIDE 100 MCG: 10 INJECTION INTRAVENOUS at 14:59

## 2023-03-09 RX ADMIN — VANCOMYCIN HYDROCHLORIDE 2000 MG: 1 INJECTION, POWDER, LYOPHILIZED, FOR SOLUTION INTRAVENOUS at 22:38

## 2023-03-09 RX ADMIN — DAPTOMYCIN 450 MG: 500 INJECTION, POWDER, LYOPHILIZED, FOR SOLUTION INTRAVENOUS at 21:26

## 2023-03-09 RX ADMIN — KETAMINE HYDROCHLORIDE 15 MG: 10 INJECTION, SOLUTION INTRAMUSCULAR; INTRAVENOUS at 14:56

## 2023-03-09 RX ADMIN — PHENYLEPHRINE HYDROCHLORIDE 100 MCG: 10 INJECTION INTRAVENOUS at 14:44

## 2023-03-09 RX ADMIN — Medication 5 MG: at 15:12

## 2023-03-09 RX ADMIN — FAMOTIDINE 20 MG: 10 INJECTION INTRAVENOUS at 14:23

## 2023-03-09 RX ADMIN — SUGAMMADEX 200 MG: 100 INJECTION, SOLUTION INTRAVENOUS at 15:40

## 2023-03-09 RX ADMIN — KETAMINE HYDROCHLORIDE 15 MG: 10 INJECTION, SOLUTION INTRAMUSCULAR; INTRAVENOUS at 15:08

## 2023-03-09 RX ADMIN — KETAMINE HYDROCHLORIDE 10 MG: 10 INJECTION, SOLUTION INTRAMUSCULAR; INTRAVENOUS at 15:18

## 2023-03-09 RX ADMIN — PHENYLEPHRINE HYDROCHLORIDE 100 MCG: 10 INJECTION INTRAVENOUS at 15:55

## 2023-03-09 RX ADMIN — PHENYLEPHRINE HYDROCHLORIDE 100 MCG: 10 INJECTION INTRAVENOUS at 14:54

## 2023-03-09 RX ADMIN — ASPIRIN 81 MG: 81 TABLET, COATED ORAL at 21:20

## 2023-03-09 RX ADMIN — ROPIVACAINE HYDROCHLORIDE: 5 INJECTION, SOLUTION EPIDURAL; INFILTRATION; PERINEURAL at 14:20

## 2023-03-09 RX ADMIN — Medication 3000 MG: at 15:11

## 2023-03-09 RX ADMIN — MAGNESIUM SULFATE HEPTAHYDRATE 1 G: 500 INJECTION, SOLUTION INTRAMUSCULAR; INTRAVENOUS at 14:42

## 2023-03-09 RX ADMIN — GABAPENTIN 300 MG: 300 CAPSULE ORAL at 21:20

## 2023-03-09 RX ADMIN — FUROSEMIDE 20 MG: 10 INJECTION, SOLUTION INTRAMUSCULAR; INTRAVENOUS at 14:06

## 2023-03-09 RX ADMIN — PHENYLEPHRINE HYDROCHLORIDE 100 MCG: 10 INJECTION INTRAVENOUS at 14:49

## 2023-03-09 RX ADMIN — Medication 10 MG: at 14:47

## 2023-03-09 RX ADMIN — MIDAZOLAM 1 MG: 1 INJECTION INTRAMUSCULAR; INTRAVENOUS at 13:30

## 2023-03-09 RX ADMIN — TRANEXAMIC ACID 1000 MG: 1 INJECTION, SOLUTION INTRAVENOUS at 14:20

## 2023-03-09 RX ADMIN — Medication 10 MG: at 15:02

## 2023-03-09 RX ADMIN — ROPIVACAINE HYDROCHLORIDE 30 ML: 5 INJECTION, SOLUTION EPIDURAL; INFILTRATION; PERINEURAL at 13:32

## 2023-03-09 RX ADMIN — TORSEMIDE 10 MG: 20 TABLET ORAL at 21:21

## 2023-03-09 RX ADMIN — Medication 10 MG: at 14:55

## 2023-03-09 RX ADMIN — SODIUM CHLORIDE: 9 INJECTION, SOLUTION INTRAVENOUS at 13:13

## 2023-03-09 ASSESSMENT — ENCOUNTER SYMPTOMS
SORE THROAT: 0
SINUS PRESSURE: 0
SINUS PAIN: 0
EYE DISCHARGE: 0
DIARRHEA: 0
CONSTIPATION: 0
NAUSEA: 0
SHORTNESS OF BREATH: 0
RHINORRHEA: 0
EYE REDNESS: 0
ABDOMINAL PAIN: 0
COUGH: 0
BACK PAIN: 0
WHEEZING: 0

## 2023-03-09 ASSESSMENT — PAIN - FUNCTIONAL ASSESSMENT: PAIN_FUNCTIONAL_ASSESSMENT: NONE - DENIES PAIN

## 2023-03-09 ASSESSMENT — COPD QUESTIONNAIRES: CAT_SEVERITY: MODERATE

## 2023-03-09 NOTE — ANESTHESIA POSTPROCEDURE EVALUATION
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     Amy Phase I: Amy Score: 10    Amy Phase II:        Anesthesia Post Evaluation    Patient location during evaluation: PACU  Patient participation: complete - patient participated  Level of consciousness: awake and awake and alert  Pain score: 2  Airway patency: patent  Nausea & Vomiting: no vomiting  Complications: no  Cardiovascular status: blood pressure returned to baseline  Respiratory status: acceptable  Hydration status: euvolemic  Multimodal analgesia pain management approach

## 2023-03-09 NOTE — ANESTHESIA PROCEDURE NOTES
Peripheral Block    Patient location during procedure: pre-op  Reason for block: post-op pain management and at surgeon's request  Start time: 3/9/2023 1:32 PM  End time: 3/9/2023 1:36 PM  Staffing  Performed: anesthesiologist   Anesthesiologist: Do Enriquez MD  Preanesthetic Checklist  Completed: patient identified, IV checked, site marked, risks and benefits discussed, surgical/procedural consents, equipment checked, pre-op evaluation, timeout performed, anesthesia consent given, oxygen available and monitors applied/VS acknowledged  Peripheral Block   Patient position: supine  Prep: ChloraPrep  Provider prep: mask and sterile gloves  Patient monitoring: cardiac monitor, continuous pulse ox, frequent blood pressure checks and IV access  Block type: Femoral  Adductor canal (Low Femoral)  Laterality: left  Injection technique: single-shot  Guidance: ultrasound guided  Local infiltration: lidocaine  Infiltration strength: 1 %  Local infiltration: lidocaine  Dose: 3 mL    Needle   Needle type: insulated echogenic nerve stimulator needle   Needle gauge: 21 G  Needle localization: ultrasound guidance  Test dose: negative  Needle length: 10 cm  Assessment   Injection assessment: negative aspiration for heme, no paresthesia on injection and local visualized surrounding nerve on ultrasound  Paresthesia pain: none  Slow fractionated injection: yes  Hemodynamics: stable  Real-time US image taken/store: yes  Outcomes: uncomplicated    Additional Notes  U/S [id number]. (1) Under ultrasound guidance, a  gauge needle was inserted and placed in close proximity to the  nerve. (2) Ultrasound was also used to visualize the spread of the anesthetic in close proximity to the nerve being blocked. (3) The nerve appeared anatomically normal, and (4 there were no apparent abnormal pathological findings on the image that were readily visible and related to the nerve being blocked.  (5) A permanent ultrasound image was saved in the patient's record.             Medications Administered  ropivacaine (NAROPIN) injection 0.5% - Perineural   30 mL - 3/9/2023 1:32:00 PM

## 2023-03-09 NOTE — ANESTHESIA PRE PROCEDURE
Department of Anesthesiology  Preprocedure Note       Name:  Rosi Mas   Age:  79 y.o.  :  1940                                          MRN:  [id number]         Date:  3/9/2023      Surgeon: Ammy Query):  Maria Elena Connell MD    Procedure: Procedure(s):  LEFT KNEE IRRIGATION AND DEBRIDEMENT-ADDUCTOR BLOCK; CHERRY  LEFT KNEE POLYETHLENE EXCHANGE WITH SYNOVECTOMY    Medications prior to admission:   Prior to Admission medications    Medication Sig Start Date End Date Taking? Authorizing Provider   acetaminophen (TYLENOL) 500 MG tablet Take 2 tablets by mouth 3 times daily 22   Luigi Manrique   apixaban (ELIQUIS) 5 MG TABS tablet Take by mouth 2 times daily    Historical Provider, MD   losartan (COZAAR) 50 mg tablet Take 50 mg by mouth daily    Historical Provider, MD   amLODIPine (NORVASC) 2.5 MG tablet Take 2.5 mg by mouth at bedtime    Historical Provider, MD   levothyroxine (SYNTHROID) 125 MCG tablet Take 125 mcg by mouth Daily    Historical Provider, MD   atorvastatin (LIPITOR) 40 MG tablet Take 40 mg by mouth at bedtime    Historical Provider, MD   gabapentin (NEURONTIN) 300 MG capsule Take 300 mg by mouth 4 times daily.     Historical Provider, MD   torsemide (DEMADEX) 10 MG tablet Take 10 mg by mouth daily    Historical Provider, MD   omeprazole (PRILOSEC) 20 MG delayed release capsule Take 20 mg by mouth daily    Historical Provider, MD   SITagliptin (JANUVIA) 100 MG tablet Take 100 mg by mouth daily    Historical Provider, MD   insulin glargine (LANTUS) 100 UNIT/ML injection vial Inject into the skin nightly Depends on blood sugar    35-40units    Historical Provider, MD   insulin lispro (HUMALOG) 100 UNIT/ML SOLN injection vial Inject into the skin 3 times daily (with meals) 10units-12units    Historical Provider, MD   Calcium Polycarbophil (FIBER-CAPS PO) Take by mouth    Historical Provider, MD   Fluticasone Propionate (FLONASE NA) by Nasal route    Historical Provider, MD Probiotic Product (PROBIOTIC-10 PO) Take by mouth    Historical Provider, MD   aspirin 81 MG EC tablet Take 81 mg by mouth daily    Historical Provider, MD       Current medications:    No current facility-administered medications for this visit. No current outpatient medications on file.      Facility-Administered Medications Ordered in Other Visits   Medication Dose Route Frequency Provider Last Rate Last Admin    tranexamic acid (CYKLOKAPRON) 1,000 mg in sodium chloride 0.9 % 60 mL IVPB  1,000 mg IntraVENous Once Gordy Hernandez MD        ceFAZolin (ANCEF) 3000 mg in sodium chloride 0.9% 100 mL IVPB  3,000 mg IntraVENous [facility] [address] [facility] Just[facility]        0.9 % sodium chloride infusion   IntraVENous Continuous Gordy Hernandez MD 50 mL/hr at 03/09/23 1313 New Bag at 03/09/23 1313    lidocaine PF 1 % injection 0.5 mL  0.5 mL IntraDERmal Once Gordy Hernandez MD        tranexamic acid (CYKLOKAPRON) 1,000 mg in sodium chloride 0.9 % 60 mL IVPB  1,000 mg IntraVENous Once Gordy Hernandez MD        ortho mix [place] Shawl) injection   Injection On Call Gordy Hernandez MD        midazolam PF (VERSED) injection 2 mg  2 mg IntraVENous Once Ginger Felty, MD           Allergies:  No Known Allergies    Problem List:    Patient Active Problem List   Diagnosis Code    Failed total knee, left, 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    Failed total knee, right, subsequent encounter T84.012D    HTN (hypertension) I10    Hypothyroid E03.9    DM2 (diabetes mellitus, type 2) ([facility] [address] E11.9    Acute blood loss as cause of postoperative anemia D62       Past Medical History:        Diagnosis Date    Arthritis     Atrial fibrillation ([address]     CAD (coronary artery disease)     Diabetes mellitus ([address]     Hyperlipidemia     Hypertension     Thyroid disease        Past Surgical History:        Procedure Laterality Date    BACK SURGERY      cervical   lumbar    CARDIAC SURGERY      bypassx4    CAROTID ENDARTERECTOMY      FOOT SURGERY      left    JOINT REPLACEMENT      REVISION TOTAL KNEE ARTHROPLASTY Left 7/14/2022    REVISION LEFT TOTAL KNEE ARTHROPLASTY-CHERRY; ADDUCTOR BLOCK performed by Nuvia Loyola MD at [address] [place] [address] History:    Social History     Tobacco Use    Smoking status: Never    Smokeless tobacco: Never   Substance Use Topics    Alcohol use: Yes     Comment: soc                                Counseling given: Not Answered      Vital Signs (Current): There were no vitals filed for this visit.                                            BP Readings from Last 3 Encounters:   03/09/23 128/69   07/15/22 (!) 86/44       NPO Status:                                                                                 BMI:   Wt Readings from Last 3 Encounters:   03/09/23 270 lb (122.5 kg)   07/14/22 265 lb (120.2 kg)     There is no height or weight on file to calculate BMI.    CBC:   Lab Results   Component Value Date/Time    WBC 5.4 02/27/2023 10:28 AM    RBC 3.02 02/27/2023 10:28 AM    HGB 10.2 02/27/2023 10:28 AM    HCT 31.0 02/27/2023 10:28 AM    .6 02/27/2023 10:28 AM    RDW 20.3 02/27/2023 10:28 AM     02/27/2023 10:28 AM       CMP:   Lab Results   Component Value Date/Time     02/27/2023 10:28 AM    K 4.7 02/27/2023 10:28 AM    CL 99 02/27/2023 10:28 AM    CO2 26 02/27/2023 10:28 AM    BUN 44 02/27/2023 10:28 AM    CREATININE 2.0 02/27/2023 10:28 AM    GFRAA >60 07/15/2022 04:28 AM    AGRATIO 1.2 02/27/2023 10:28 AM    LABGLOM 32 02/27/2023 10:28 AM    GLUCOSE 180 02/27/2023 10:28 AM    PROT 6.7 02/27/2023 10:28 AM    CALCIUM 9.2 02/27/2023 10:28 AM    BILITOT 0.3 02/27/2023 10:28 AM    ALKPHOS 117 02/27/2023 10:28 AM    AST 77 02/27/2023 10:28 AM    ALT 44 02/27/2023 10:28 AM       POC Tests:   Recent Labs     03/09/23  1301   POCGLU 106*       Coags:   Lab Results   Component Value Date/Time    PROTIME 16.2 02/27/2023 10:28 AM    INR 1.30 02/27/2023 10:28 AM    APTT 40.0 02/27/2023 10:28 AM       HCG (If Applicable): No results found for: PREGTESTUR, PREGSERUM, HCG, HCGQUANT     ABGs: No results found for: PHART, PO2ART, RAP7HKI, VMS5MFQ, BEART, A6RAJLFZ     Type & Screen (If Applicable):  No results found for: LABABO, LABRH    Drug/Infectious Status (If Applicable):  No results found for: HIV, HEPCAB    COVID-19 Screening (If Applicable): No results found for: COVID19        Anesthesia Evaluation  Patient summary reviewed and Nursing notes reviewed no history of anesthetic complications:   Airway: Mallampati: II  TM distance: >3 FB   Neck ROM: limited  Mouth opening: < 3 FB   Dental: normal exam         Pulmonary:   (+) COPD: moderate,  sleep apnea:                             Cardiovascular:  Exercise tolerance: poor (<4 METS),   (+) hypertension: moderate, CAD:, CABG/stent (1995):, dysrhythmias: atrial fibrillation,                   Neuro/Psych:   Negative Neuro/Psych ROS               ROS comment: S/p back and neck surgery GI/Hepatic/Renal: Neg GI/Hepatic/Renal ROS            Endo/Other:    (+) DiabetesType II DM, well controlled, using insulin, . Abdominal:   (+) obese,           Vascular: negative vascular ROS. Other Findings:             Anesthesia Plan      regional and general     ASA 3     (Will plan for adductor + General)  Induction: intravenous. MIPS: Postoperative opioids intended and Postoperative trial extubation. Anesthetic plan and risks discussed with patient. Plan discussed with CRNA.                     Shante Haynes MD   3/9/2023

## 2023-03-09 NOTE — PROGRESS NOTES
Pt resting quietly in bed, awake, denies pain. VSS, O2 sats 97% on room air. Dressing to left leg dry and intact, immobilizer in place. Left pedal and posterior tibial pulses confirmed with doppler, foot cool, cap refill more than 3 seconds. Pt seen by anesthesia, phase 1 criteria met. Will transfer pt to .

## 2023-03-09 NOTE — OP NOTE
Operative Note      Patient: Dennie Maul  YOB: 1940  MRN: [id number]    Date of Procedure: 3/9/2023    Pre-Op Diagnosis: Infection and inflammatory reaction due to internal left knee prosthesis, initial encounter ([facility] [address] [T84.54XA]  Presence of left artificial knee joint [Z96.652]    Post-Op Diagnosis: Same       Procedure(s):  LEFT KNEE IRRIGATION AND DEBRIDEMENT-ADDUCTOR BLOCK; Gorge Majors  LEFT KNEE POLYETHLENE EXCHANGE WITH SYNOVECTOMY    Surgeon(s):  Kevin Andersen MD    Assistant:   Surgical Assistant: China Jordan, [id number]    Anesthesia: Spinal    Estimated Blood Loss (mL): Minimal    Complications: None    Specimens:   ID Type Source Tests Collected by Time Destination   1 :  Body Fluid Fluid CELL COUNT WITH DIFFERENTIAL, BODY FLUID, CULTURE, ANAEROBIC, CULTURE WITH SMEAR, ACID FAST Precious Hernandez MD 3/9/2023 1505    2 :  Tissue Tissue CULTURE, FUNGUS, CULTURE, TISSUE Kevin Andersen MD 3/9/2023 1519        Implants:  Implant Name Type Inv. Item Serial No.  Lot No. LRB No. Used Action   [id number] OSTEOBOAST SELECT 10ML OSTEOREMEDIES [facility]     [id number] Left 1 Implanted         Drains: * No LDAs found *    Findings: Marked synovitis    Detailed Description of Procedure:   Operative Report    Patient: Dennie Maul  YOB: 1940  Age: 79 y.o. Sex: male  MRN: [id number]    DATE OF OPERATION : 3/9/2023    SURGEON: Kevin Andersen MD    ASSISTANT:  Barrington Pimentel is a board certified physician assistant who is medically necessary as a first assistant in the operating room with me. He is responsible for assisting with positioning of the patient,retraction,cauterization and manipulation of the involved extremity to allow for improved visualization throughout the surgical procedure facilitating optimal placement of the implants providing a stable, functional joint replacement.  His presence in the operating room with me as a first assistant during joint replacement procedures enables me to perform the surgical procedure in a more timely and efficient manner. The hospital does not provide me with a first assistant in the operating room who has the same surgical skills as my physician assistant. PREOPERATIVE DIAGNOSIS: Infected left TKA    POSTOPERATIVE DIAGNOSIS:   Infected left TKA      OPERATIVE PROCEDURE:    Open left TKA  I&D with poly exchange    COMPLICATIONS: none    CONSULTATIONS: none    ANESTHESIA: General + regional    Pre op Antibiotics: none    EBL: less than 50ml    INDICATIONS FOR SURGERY:   Rao Castellanos is a 79 y.o. male patient with significant pain in the left knee . The patient previously underwent a left TKA followed 16 years later by a revision tka. The patient had a protected recovery secondary to poor vascularity and persistent cellulitis. His wound ultimately healed and he was feeling well for a while. He followd up with me with complaints of consistent and incresing pain in his knee. His clinical exam findings were consistent with ia possible pyoarthrosis. I performed a sterile aspiration with resultant identification of staphylococcus growth. I have recommended Open I&D, poly exchange . The patient has elected to proceed with surgery    OPERATIVE FINDINGS:  Moderate purulent fluid, synovitis. Knee implant stable       IMPLANTS USED:     left knee Sukhdev Personna CCK 10 mm polyethylene spacer  20 cc of [employer] osteoboost antibiotic beads with 4 grams of Vancomycin and 320 mg of liquod gentamycin. Lauren Matthew DETAILS OF SURGERY: The patient was brought to the operating room and placed supine on the operating room table. The patient was induced under general endotracheal anesthesia. A nonsterile tourniquet was applied to the left thigh. The left lower extremity were prepped and draped in the usual sterile fashion.  The left lower extremity was exsanguinated with sterile Esmarch and tourniquet was inflated to 250 mmHg. The standard anterior incision was made. The skin and subcutaneous tissue were sharply incised. A medial parapatellar approach was performed. The knee was extended and the patella was everted with extreme care to protect the implant . The polyethylene spacer was removed without difficulty. An extensive synovectomy was performed throughout the knee. Fluid and tissue cultures were obtained. The knee was thoroughly irrigated with Bactisure followed by copious amounts of NS which had been injected with antibiotics. Trial reductions were carefully performed and felt to be most stable with a 10 mm spacer. The knee came to full flexion, full extension with no varus or valgus instability. A final  10 mm spacer was applied. The knee was irrigated with copious amounts of normal saline. 20 cc of [employer] osteoboost  beads were placed. The medial parapatellar approach was closed with number 1 Ethibond suture. The skin was closed with 0-0 interrpted vicryl sutures,  2-0 interrupted Vicryl suture, and 2-0 interrupted Nylon sutures. A dry sterile dressing was applied. The tourniquet was deflated. The patient was awakened and taken stable to the recovery room for postoperative care and pain management. The patient is to remain in orthopedic service for pain stabilization and to be discharged in 1-2 days time.        ELECTRONICALLY SIGNED BY: Marcy Fleming MD, 3/9/2023     Electronically signed by Marcy Fleming MD on 3/9/2023 at 3:44 PM

## 2023-03-09 NOTE — CONSULTS
Infectious Diseases   Consult Note        Admission Date: 3/9/2023  Hospital Day: Hospital Day: 1   Attending: Fer Alejandro, *  Date of service: 3/9/23     Reason for admission: Infection and inflammatory reaction due to internal left knee prosthesis, initial encounter ([facility]) [T84.54XA]  Presence of left artificial knee joint [Z96.652]    Chief complaint/ Reason for consult: Left knee prosthetic joint infection      Microbiology:        I have reviewed allavailable micro lab data and cultures    Left knee surgical culture  - collected on 3/9/2023: In process      Antibiotics and immunizations:       Current antibiotics: All antibiotics and their doses were reviewed by me    Recent Abx Admin                     gentamicin (GARAMYCIN) 320 mg ()  Given 03/09/23 1536    vancomycin (VANCOCIN) injection (mg) 4,000 mg Given 03/09/23 1535    ceFAZolin (ANCEF) 3000 mg in sodium chloride 0.9% 100 mL IVPB (mg) 3,000 mg Given 03/09/23 1511                      Immunization History: All immunization history was reviewed by me today.      There is no immunization history on file for this patient.    Known drug allergies:     All allergies were reviewed and updated    No Known Allergies    Social history:     Social History:  All social andepidemiologic history was reviewed and updated by me today as needed.     Tobacco use:   reports that he has never smoked. He has never used smokeless tobacco.  Alcohol use:   reports current alcohol use.  Currently lives in: [place] [address]   reports no history of drug use.     COVID VACCINATION AND LAB RESULT RECORDS:     Internal Administration   First Dose      Second Dose           Last COVID Lab No results found for: SARS-COV-2, SARS-COV-2 RNA, SARS-COV-2, SARS-COV-2, SARS-COV-2 BY PCR, SARS-COV-2, SARS-COV-2, SARS-COV-2         Assessment:     The patient is a 83 y.o. old male who  has a past medical history of Arthritis, Atrial fibrillation ([facility]), CAD (coronary artery  disease), Diabetes mellitus ([facility] [address] Hyperlipidemia, Hypertension, and Thyroid disease. with following problems:    Left knee prosthetic joint infection  S/p left knee surgical debridement and hardware retention with polyethylene exchange on 3/9/2023  History of index left knee arthroplasty several years ago followed by revision arthroplasty 16 years later  History of recurrent leg cellulitis  Essential hypertension  Mixed hyperlipidemia  History of atrial fibrillation  Coronary artery disease  Type 2 diabetes mellitus  Obesity Class 2 due to excess calorie intake : Body mass index is 36.62 kg/m². Discussion:      The patient was admitted for left knee prosthetic joint infection and has undergone surgical debridement and hardware retention surgery earlier today. The patient had a diagnostic arthrocentesis done recently in orthopedic office which reportedly grew Staphylococcus    His last set of labs was done on 2/27/2023. He had mild transaminitis. Serum creatinine was 2.0. His white cell count was 5400. Hemoglobin was 10.2, platelet count was 209,691. Plan:     Diagnostic Workup:    Please obtain the culture results from orthopedic office from the recent diagnostic arthrocentesis  Will follow-up on surgical cultures  We will order baseline CK level  Continue to follow fever curve, WBC count and blood cultures  Follow up on liverand renal functions closely  Check CBC, CMP, sed rate and CRP. Antimicrobials: Will order IV daptomycin at a renally adjusted dose of 450 mg every 24 hours for staphylococcal coverage  In setting of his kidney issues and his advanced age, would like to avoid IV vancomycin  Will likely need long-term IV antibiotics for around 6 weeks followed by chronic suppressive oral antibiotic therapy  Left knee surgical site care  Pain control per primary  We will follow up on the culture results and clinical progress and will make further recommendations accordingly.   Continue close vitals monitoring.  Maintain good glycemic control.  Fall precautions.  Aspiration precautions.  Continue to watch for new fever or diarrhea.  DVT prophylaxis.  Discussed all above with patient and RN.      Drug Monitoring:    Continue serial monitoring for antibiotic toxicity as follows: CBC, CK, CMP  Continue to watch for following: new or worsening fever, hypotension, hives, lip swelling and redness or purulence at vascular access sites.    I/v access Management:    Continue to monitor i.v access sites for erythema, induration, discharge or tenderness.   As always, continue efforts to minimizetubes/lines/drains as clinically appropriate to reduce chances of line associated infections.    Current isolation precautions:    There are no current isolations documented for this patient.       Level of complexity of visit and medical decision making: High     Risk of Complications/Morbidity: High     Illness(es)/ Infection present that pose threat to life/bodily function.   There is potential for severe exacerbation of infection/side effects of treatment.  Therapy requires intensive monitoring for antimicrobial agent toxicity.     Thank you for involving me in the care of your patient. I will continue to follow. If you have any additional questions, please do not hesitate to contact me.    Subjective:     Presenting complaint in ER:     No chief complaint on file.       HPI: Brady Hernandez is a 83 y.o. male patient, who was seen at the request of Dr. Fer Alejandro, *.    History was obtained from chart review and the patient.       The patient was admitted on 3/9/2023. I have been consulted to see the patient for above mentioned reason(s).    The patient has multiple medical comorbidities, and presented to the hospital for concern for left knee prosthetic joint infection.    The patient underwent index left knee arthroplasty several years ago followed by a revision total knee arthroplasty 16 years  later.  He had a protracted recovery and had persistent cellulitis issues and eventually did heal well and did well for a while. He started having increasing pain and swelling in the left knee recently and was seen by  in his clinic and was noted to have increasing swelling and tenderness. Her diagnostic arthrocentesis revealed a growth of Staphylococcus. The patient was hospitalized and underwent left prosthetic knee debridement and hardware retention surgery today    I have been asked for my opinion for management for this patient. Past Medical History: All past medical history reviewed today. Past Medical History:   Diagnosis Date    Arthritis     Atrial fibrillation ([facility] [address]     CAD (coronary artery disease)     Diabetes mellitus ([facility] [address]     Hyperlipidemia     Hypertension     Thyroid disease          Past Surgical History: All pastsurgical history was reviewed today. Past Surgical History:   Procedure Laterality Date    BACK SURGERY      cervical   lumbar    CARDIAC SURGERY      bypassx4    CAROTID ENDARTERECTOMY      FOOT SURGERY      left    JOINT REPLACEMENT      REVISION TOTAL KNEE ARTHROPLASTY Left 7/14/2022    REVISION LEFT TOTAL KNEE ARTHROPLASTY-CHERRY; ADDUCTOR BLOCK performed by Adiel Kothari MD at [facility]           Family History: All family history was reviewed today. History reviewed. No pertinent family history. Medications: All current and past medications were reviewed.     Medications Prior to Admission: acetaminophen (TYLENOL) 500 MG tablet, Take 2 tablets by mouth 3 times daily  apixaban (ELIQUIS) 5 MG TABS tablet, Take by mouth 2 times daily  losartan (COZAAR) 50 mg tablet, Take 50 mg by mouth daily  amLODIPine (NORVASC) 2.5 MG tablet, Take 2.5 mg by mouth at bedtime  levothyroxine (SYNTHROID) 125 MCG tablet, Take 125 mcg by mouth Daily  atorvastatin (LIPITOR) 40 MG tablet, Take 40 mg by mouth at bedtime  gabapentin (NEURONTIN) 300 MG capsule, Take 300 mg by mouth 4 times daily. torsemide (DEMADEX) 10 MG tablet, Take 10 mg by mouth daily  omeprazole (PRILOSEC) 20 MG delayed release capsule, Take 20 mg by mouth daily  SITagliptin (JANUVIA) 100 MG tablet, Take 100 mg by mouth daily  insulin glargine (LANTUS) 100 UNIT/ML injection vial, Inject into the skin nightly Depends on blood sugar    35-40units  insulin lispro (HUMALOG) 100 UNIT/ML SOLN injection vial, Inject into the skin 3 times daily (with meals) 10units-12units  Calcium Polycarbophil (FIBER-CAPS PO), Take by mouth  Fluticasone Propionate (FLONASE NA), by Nasal route  Probiotic Product (PROBIOTIC-10 PO), Take by mouth  aspirin 81 MG EC tablet, Take 81 mg by mouth daily     lidocaine PF  0.5 mL IntraDERmal Once    midazolam  2 mg IntraVENous Once    amLODIPine  2.5 mg Oral Nightly    [START ON 3/10/2023] apixaban  5 mg Oral BID    aspirin  81 mg Oral Daily    atorvastatin  40 mg Oral Nightly    gabapentin  300 mg Oral 4x Daily    levothyroxine  125 mcg Oral Daily    losartan  50 mg Oral Daily    [START ON 3/10/2023] pantoprazole  40 mg Oral QAM AC    SITagliptin  100 mg Oral Daily    torsemide  10 mg Oral Daily    daptomycin (CUBICIN) IVPB  6 mg/kg (Ideal) IntraVENous Q24H          REVIEW OF SYSTEMS:       Review of Systems   Constitutional:  Negative for chills, diaphoresis and fever. HENT:  Negative for ear discharge, ear pain, postnasal drip, rhinorrhea, sinus pressure, sinus pain and sore throat. Eyes:  Negative for discharge and redness. Respiratory:  Negative for cough, shortness of breath and wheezing. Cardiovascular:  Negative for chest pain and leg swelling. Gastrointestinal:  Negative for abdominal pain, constipation, diarrhea and nausea. Endocrine: Negative for cold intolerance, heat intolerance and polydipsia. Genitourinary:  Negative for dysuria, flank pain, frequency, hematuria and urgency. Musculoskeletal:  Positive for arthralgias. Negative for back pain and myalgias. Postop pain in the left knee   Skin:  Negative for rash. Allergic/Immunologic: Negative for immunocompromised state. Neurological:  Negative for dizziness, seizures and headaches. Hematological:  Does not bruise/bleed easily. Psychiatric/Behavioral:  Negative for agitation, hallucinations and suicidal ideas. The patient is not nervous/anxious. All other systems reviewed and are negative. Objective:       PHYSICAL EXAM:      Vitals:   Vitals:    03/09/23 1313   BP: 128/69   Pulse: (!) 104   Resp: 18   Temp: 97 °F (36.1 °C)   TempSrc: Temporal   SpO2: 100%   Weight: 270 lb (122.5 kg)       Physical Exam  Vitals and nursing note reviewed. Constitutional:       Appearance: He is well-developed. He is not diaphoretic. Comments: The patient was seen and examined postoperatively   HENT:      Head: Normocephalic and atraumatic. Right Ear: External ear normal. There is no impacted cerumen. Left Ear: External ear normal. There is no impacted cerumen. Nose: Nose normal.      Mouth/Throat:      Mouth: Mucous membranes are moist.      Pharynx: Oropharynx is clear. No oropharyngeal exudate. Eyes:      General: No scleral icterus. Right eye: No discharge. Left eye: No discharge. Conjunctiva/sclera: Conjunctivae normal.      Pupils: Pupils are equal, round, and reactive to light. Neck:      Thyroid: No thyromegaly. Cardiovascular:      Rate and Rhythm: Normal rate and regular rhythm. Heart sounds: Normal heart sounds. No murmur heard. No friction rub. Pulmonary:      Effort: No respiratory distress. Breath sounds: No stridor. No wheezing or rales. Abdominal:      General: Bowel sounds are normal.      Palpations: Abdomen is soft. Tenderness: There is no abdominal tenderness. There is no guarding or rebound. Musculoskeletal:         General: Tenderness present. No swelling or deformity.  Normal range of motion. Cervical back: Normal range of motion and neck supple. Right lower leg: No edema. Left lower leg: No edema. Comments: Surgical dressing on the left knee   Lymphadenopathy:      Cervical: No cervical adenopathy. Skin:     General: Skin is warm and dry. Coloration: Skin is not jaundiced. Findings: No bruising, erythema or rash. Neurological:      General: No focal deficit present. Mental Status: He is alert and oriented to person, place, and time. Mental status is at baseline. Motor: No abnormal muscle tone. Psychiatric:         Mood and Affect: Mood normal.         Behavior: Behavior normal.         Lines and drains: All vascular access sites are healthy with no local erythema, discharge or tenderness. Intake and output:     No intake/output data recorded. Lab Data:   All available labs were reviewed by me today. CBC: No results for input(s): WBC, RBC, HGB, HCT, PLT, MCV, MCH, MCHC, RDW, NRBC, SEGSPCT, BANDSPCT in the last 72 hours. BMP:  No results for input(s): NA, K, CL, CO2, BUN, CREATININE, CALCIUM, GLUCOSE in the last 72 hours. Hepatic FunctionPanel:   Lab Results   Component Value Date/Time    [facility] 117 02/27/2023 10:28 AM    ALT 44 02/27/2023 10:28 AM    AST 77 02/27/2023 10:28 AM    PROT 6.7 02/27/2023 10:28 AM    BILITOT 0.3 02/27/2023 10:28 AM    LABALBU 3.6 02/27/2023 10:28 AM       CPK: No results found for: CKTOTAL  ESR: No results found for: SEDRATE  CRP: No results found for: CRP      Imaging: All pertinent images and reports for the current visit were reviewed by me during this visit. I reviewed the chest x-ray/CT scan/MRI images and independently interpreted the findings and results today. No orders to display       Outside records:    Labs, Microbiology, Radiology and pertinent results from Care everywhere, if available, were reviewed as a part ofthe consultation.       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    Failed total knee, right, subsequent encounter T84.012D    Essential hypertension I10    Hypothyroid E03.9    Diabetes mellitus type 2 in obese (HCC) E11.69, E66.9    Acute blood loss as cause of postoperative anemia D62    Preop testing Z01.818    Coronary artery disease due to lipid rich plaque I25.10, I25.83    Class 2 obesity due to excess calories with body mass index (BMI) of 36.0 to 36.9 in adult E66.09, Z68.36    History of atrial fibrillation Z86.79    Mixed hyperlipidemia E78.2    Recurrent cellulitis of lower extremity L03.119    Infection of prosthetic left knee joint ([address] T84.54XA         Please note that this chart was generated using Dragon dictation software. Although every effort was made to ensure the accuracy of this automated transcription, some errors in transcription may have occurred inadvertently. If you may need any clarification, please do not hesitate to contact me through EPIC or at the phone number provided below with my electronic signature. Any pictures or media included in this note were obtained after taking informed verbal consent from the patient and with their approval to include those in the patient's medical record. Jaime Harrison MD, MPH, [address] [place] [address]  3/9/2023, 4:46 PM  [facility] Infectious Disease   [address] [place] [address], [address] ([address] [place] [address].)  [address]  Office: [phone]  Fax: [phone]  In-person Clinic days:  Tuesday & Thursday a.m. Virtual clinic days: Monday, Wednesday & Friday a.m.

## 2023-03-09 NOTE — PROGRESS NOTES
Patient transferred from OR to PACU, not yet responding to physical stimuli, VSS, left knee dressing CDI, immobilizer in place, will monitor.

## 2023-03-10 LAB
AFB SMEAR: NORMAL
AFB SMEAR: NORMAL
ANION GAP SERPL CALCULATED.3IONS-SCNC: 9 MMOL/L (ref 3–16)
BASOPHILS ABSOLUTE: 0 K/UL (ref 0–0.2)
BASOPHILS RELATIVE PERCENT: 0.2 %
BUN BLDV-MCNC: 33 MG/DL (ref 7–20)
CALCIUM SERPL-MCNC: 8.7 MG/DL (ref 8.3–10.6)
CHLORIDE BLD-SCNC: 102 MMOL/L (ref 99–110)
CO2: 27 MMOL/L (ref 21–32)
CREAT SERPL-MCNC: 1.6 MG/DL (ref 0.8–1.3)
EOSINOPHILS ABSOLUTE: 0 K/UL (ref 0–0.6)
EOSINOPHILS RELATIVE PERCENT: 0 %
ESTIMATED AVERAGE GLUCOSE: 159.9 MG/DL
FUNGUS STAIN: NORMAL
GFR SERPL CREATININE-BSD FRML MDRD: 42 ML/MIN/{1.73_M2}
GLUCOSE BLD-MCNC: 249 MG/DL (ref 70–99)
GLUCOSE BLD-MCNC: 258 MG/DL (ref 70–99)
GLUCOSE BLD-MCNC: 282 MG/DL (ref 70–99)
GLUCOSE BLD-MCNC: 333 MG/DL (ref 70–99)
GLUCOSE BLD-MCNC: 348 MG/DL (ref 70–99)
HBA1C MFR BLD: 7.2 %
HCT VFR BLD CALC: 30.3 % (ref 40.5–52.5)
HEMOGLOBIN: 9.9 G/DL (ref 13.5–17.5)
LYMPHOCYTES ABSOLUTE: 0.8 K/UL (ref 1–5.1)
LYMPHOCYTES RELATIVE PERCENT: 16.5 %
MCH RBC QN AUTO: 34.4 PG (ref 26–34)
MCHC RBC AUTO-ENTMCNC: 32.7 G/DL (ref 31–36)
MCV RBC AUTO: 105.4 FL (ref 80–100)
MONOCYTES ABSOLUTE: 0.1 K/UL (ref 0–1.3)
MONOCYTES RELATIVE PERCENT: 2.2 %
NEUTROPHILS ABSOLUTE: 3.8 K/UL (ref 1.7–7.7)
NEUTROPHILS RELATIVE PERCENT: 81.1 %
PDW BLD-RTO: 21 % (ref 12.4–15.4)
PERFORMED ON: ABNORMAL
PLATELET # BLD: 188 K/UL (ref 135–450)
PMV BLD AUTO: 8.4 FL (ref 5–10.5)
POTASSIUM SERPL-SCNC: 4.9 MMOL/L (ref 3.5–5.1)
RBC # BLD: 2.87 M/UL (ref 4.2–5.9)
SODIUM BLD-SCNC: 138 MMOL/L (ref 136–145)
WBC # BLD: 4.7 K/UL (ref 4–11)

## 2023-03-10 PROCEDURE — 80048 BASIC METABOLIC PNL TOTAL CA: CPT

## 2023-03-10 PROCEDURE — 6360000002 HC RX W HCPCS: Performed by: INTERNAL MEDICINE

## 2023-03-10 PROCEDURE — 94760 N-INVAS EAR/PLS OXIMETRY 1: CPT

## 2023-03-10 PROCEDURE — 97530 THERAPEUTIC ACTIVITIES: CPT

## 2023-03-10 PROCEDURE — 97535 SELF CARE MNGMENT TRAINING: CPT

## 2023-03-10 PROCEDURE — 97162 PT EVAL MOD COMPLEX 30 MIN: CPT

## 2023-03-10 PROCEDURE — 36415 COLL VENOUS BLD VENIPUNCTURE: CPT

## 2023-03-10 PROCEDURE — 87205 SMEAR GRAM STAIN: CPT

## 2023-03-10 PROCEDURE — 6370000000 HC RX 637 (ALT 250 FOR IP): Performed by: INTERNAL MEDICINE

## 2023-03-10 PROCEDURE — 6370000000 HC RX 637 (ALT 250 FOR IP): Performed by: ORTHOPAEDIC SURGERY

## 2023-03-10 PROCEDURE — 97116 GAIT TRAINING THERAPY: CPT

## 2023-03-10 PROCEDURE — 87176 TISSUE HOMOGENIZATION CULTR: CPT

## 2023-03-10 PROCEDURE — 83036 HEMOGLOBIN GLYCOSYLATED A1C: CPT

## 2023-03-10 PROCEDURE — 85025 COMPLETE CBC W/AUTO DIFF WBC: CPT

## 2023-03-10 PROCEDURE — 99233 SBSQ HOSP IP/OBS HIGH 50: CPT | Performed by: INTERNAL MEDICINE

## 2023-03-10 PROCEDURE — 1200000000 HC SEMI PRIVATE

## 2023-03-10 PROCEDURE — 97165 OT EVAL LOW COMPLEX 30 MIN: CPT

## 2023-03-10 PROCEDURE — 94150 VITAL CAPACITY TEST: CPT

## 2023-03-10 RX ORDER — LINEZOLID 2 MG/ML
600 INJECTION, SOLUTION INTRAVENOUS EVERY 12 HOURS
Status: DISCONTINUED | OUTPATIENT
Start: 2023-03-10 | End: 2023-03-13

## 2023-03-10 RX ADMIN — ALOGLIPTIN 12.5 MG: 12.5 TABLET, FILM COATED ORAL at 08:44

## 2023-03-10 RX ADMIN — INSULIN LISPRO 4 UNITS: 100 INJECTION, SOLUTION INTRAVENOUS; SUBCUTANEOUS at 21:51

## 2023-03-10 RX ADMIN — LOSARTAN POTASSIUM 50 MG: 25 TABLET, FILM COATED ORAL at 08:44

## 2023-03-10 RX ADMIN — INSULIN LISPRO 4 UNITS: 100 INJECTION, SOLUTION INTRAVENOUS; SUBCUTANEOUS at 12:39

## 2023-03-10 RX ADMIN — LEVOTHYROXINE SODIUM 125 MCG: 0.12 TABLET ORAL at 05:24

## 2023-03-10 RX ADMIN — INSULIN LISPRO 4 UNITS: 100 INJECTION, SOLUTION INTRAVENOUS; SUBCUTANEOUS at 08:41

## 2023-03-10 RX ADMIN — GABAPENTIN 300 MG: 300 CAPSULE ORAL at 12:33

## 2023-03-10 RX ADMIN — ACETAMINOPHEN 1000 MG: 500 TABLET ORAL at 05:24

## 2023-03-10 RX ADMIN — LINEZOLID 600 MG: 600 INJECTION, SOLUTION INTRAVENOUS at 12:36

## 2023-03-10 RX ADMIN — OXYCODONE 10 MG: 5 TABLET ORAL at 06:52

## 2023-03-10 RX ADMIN — PANTOPRAZOLE SODIUM 40 MG: 40 TABLET, DELAYED RELEASE ORAL at 05:24

## 2023-03-10 RX ADMIN — ACETAMINOPHEN 1000 MG: 500 TABLET ORAL at 12:33

## 2023-03-10 RX ADMIN — GABAPENTIN 300 MG: 300 CAPSULE ORAL at 21:49

## 2023-03-10 RX ADMIN — GABAPENTIN 300 MG: 300 CAPSULE ORAL at 17:32

## 2023-03-10 RX ADMIN — ACETAMINOPHEN 1000 MG: 500 TABLET ORAL at 21:49

## 2023-03-10 RX ADMIN — INSULIN LISPRO 6 UNITS: 100 INJECTION, SOLUTION INTRAVENOUS; SUBCUTANEOUS at 17:33

## 2023-03-10 RX ADMIN — GABAPENTIN 300 MG: 300 CAPSULE ORAL at 08:44

## 2023-03-10 RX ADMIN — TORSEMIDE 10 MG: 20 TABLET ORAL at 08:44

## 2023-03-10 ASSESSMENT — ENCOUNTER SYMPTOMS
SINUS PRESSURE: 0
ABDOMINAL PAIN: 0
SORE THROAT: 0
NAUSEA: 0
SHORTNESS OF BREATH: 0
EYE REDNESS: 0
WHEEZING: 0
BACK PAIN: 0
COUGH: 0
SINUS PAIN: 0
DIARRHEA: 0
CONSTIPATION: 0
EYE DISCHARGE: 0
RHINORRHEA: 0

## 2023-03-10 ASSESSMENT — PAIN DESCRIPTION - LOCATION
LOCATION: LEG
LOCATION: TEETH

## 2023-03-10 ASSESSMENT — PAIN SCALES - GENERAL
PAINLEVEL_OUTOF10: 6
PAINLEVEL_OUTOF10: 7
PAINLEVEL_OUTOF10: 5

## 2023-03-10 NOTE — PROGRESS NOTES
[facility] [address]   Phone: [phone]    Occupational Therapy    [x] Initial Evaluation            [] Daily Treatment Note         [] Discharge Summary      Patient: Melissa Avery   : 1940   MRN: [id number]   Date of Service:  3/10/2023    Admitting Diagnosis:  Direct infection of left knee in infectious and parasitic diseases classified elsewhere [facility])  Current Admission Summary: LEFT KNEE IRRIGATION AND DEBRIDEMENT-ADDUCTOR BLOCK; CHERRY  LEFT KNEE POLYETHLENE EXCHANGE WITH SYNOVECTOMY  Past Medical History:  has a past medical history of Arthritis, Atrial fibrillation ([facility] [address] CAD (coronary artery disease), Diabetes mellitus ([facility] [address] Hyperlipidemia, Hypertension, and Thyroid disease. Past Surgical History:  has a past surgical history that includes Cardiac surgery; Carotid endarterectomy; sinus surgery; joint replacement; back surgery; Foot surgery; Revision total knee arthroplasty (Left, 2022); Knee Arthrotomy (Left, 3/9/2023); and Revision total knee arthroplasty (Left, 3/9/2023). Discharge Recommendations: ***    DME Required For Discharge: {FFOTD/C TPKACCKRR:38696}    Precautions/Restrictions: {FFRESTRICTIONS:30537}  Weight Bearing Restrictions: {FFWBRESTRICTIONS:11240}  [] Right Upper Extremity  [] Left Upper Extremity [] Right Lower Extremity  [] Left Lower Extremity     Required Braces/Orthotics: {ffbraces:58452}   [] Right  [] Left  Positional Restrictions:{ffpositionrestrictions:84260}    Pre-Admission Information   Lives With: spouse                  Type of Home: house  Home Layout: one level  Home Access: level entry  Bathroom Layout: walk in shower, . Comment: bathroom not accessible with RW  Toilet Height: elevated height  Bathroom Equipment: grab bars in shower, shower chair, hand held shower head  Home Equipment: rollator - 4 wheeled walker  Transfer Assistance:  Mod I with [id number]  Ambulation Assistance: modified independent with use of 4WW d/t pain  ADL Assistance: Independent with ADLs  IADL Assistance: requires assistance with all homemaking tasks, requires assistance with meal prep, requires assistance with laundry, requires assistance with vacuuming, requires assistance with cleaning, pt empties , pt gets his own breakfast and lunch  Active :        [x] Yes                 [] No--does not drive at night or in the rain. Current Employment: retired--. Hobbies: hang out with friends and go to [facility] [place]: No recent falls since x1 year ago. Comment: pt battling cellulitis in LLE    Examination   Vision:   Vision Gross Assessment: Impaired and Vision Corrective Device: wears glasses at all times  Pt with diabetic retinopathy and extremely poor vision in Right eye. Hearing:   hard of hearing  Perception:   {ffotperception:14983}  Observation:   {ffptobservation:31236}  Posture:   ***  Sensation:   reports numbness and tingling in (B) LE  Proprioception:    WFL  Tone:   Normotonic    ROM:   {FFOTROM:11376}  Strength:   {ffotstrength:83006}    Therapist Clinical Decision Making (Complexity): {ffptdecisionmakin}  Clinical Presentation: {ffptclinicalpresentation:47220}      Subjective  General: Pt sitting in recliner upon entry. Spouse also present. He is [place]. He is pleasant and agreeable to PT/OT evaluations. Pain: 3/10.   Location: L knee  Pain Interventions: pain medication in place prior to arrival and RN notified        Activities of Daily Living  Basic Activities of Daily Living  {ffotadl:04133}  Instrumental Activities of Daily Living  {ffotiadl:64805}    Functional Mobility  Bed Mobility  {ffptbedmobility:91836}  Comments:  Transfers  {ffottransfers:56536}  Comments:  Functional Mobility:  {ffotfunctional:84483}    Other Therapeutic Interventions    Functional Outcomes       Cognition  {ffptcognition:35492}  Orientation:    {ffptorientation:90512}  Command Following: {ffptcommandfollowin}     Education  Barriers To Learning: {ffptlearning barriers:26396}  Patient Education: patient educated on {ffoteducation:03257}  Learning Assessment:  {ffptlearnin}    Assessment  Activity Tolerance: ***  Impairments Requiring Therapeutic Intervention: {ffptimpairments:82134}  Prognosis: {ffptprognosis:42362}  Clinical Assessment: ***  Safety Interventions: {ffptsafety:28238}    Plan  Frequency: {ffptfrequency:08070}  Current Treatment Recommendations: {ffpttreatmentrecommendations:12411}    Goals  Patient Goals: ***   Short Term Goals:  Time Frame: ***  {ffotgoals:14203}    Therapy Session Time     Individual Group Co-treatment   Time In        Time Out        Minutes             Timed Code Treatment Minutes:      Total Treatment Minutes:         Electronically Signed By: Rosana Cheng OT

## 2023-03-10 NOTE — CARE COORDINATION
Discharge Planning Note:    Chart reviewed and it appears that patient has minimal needs for discharge at this time. Discussed with patient and requested that case management be notified if discharge needs are identified.     - Current discharge plan is for the patient to return home. - Patient states he has a walker    - PT/OT  pending    - [address] will be following the patient upon discharge. Case management will continue to follow progress and update discharge plan as needed.       Risk of Readmission Score: 16%    CALEB Ramsey RN    [facility]  Phone: [phone]

## 2023-03-10 NOTE — DISCHARGE INSTR - COC
Continuity of Care Form    Patient Name: Toño Barrientos   :  1940  MRN:  [id number]    Admit date:  3/9/2023  Discharge date:  2023    Code Status Order: Full Code   Advance Directives:   [facility] Documentation       Date/Time Healthcare Directive Type of Healthcare Directive Copy in [address] Agent's Name Healthcare Agent's Phone Number    [phone] Yes, patient has an advance directive for healthcare treatment Living will;Durable power of  for health care Yes, copy in chart -- wife --            Admitting Physician:  Kathie Coronado MD  PCP: Klaus Pineda    Discharging Nurse: [facility] Unit/Room#: [id number]/1729-61  Discharging Unit Phone Number: [phone]    Emergency Contact:   Extended Emergency Contact Information  Primary Emergency Contact: cinthya del toro  [place] Phone: [phone]  Relation: Spouse    Past Surgical History:  Past Surgical History:   Procedure Laterality Date    BACK SURGERY      cervical   lumbar    CARDIAC SURGERY      bypassx4    CAROTID ENDARTERECTOMY      FOOT SURGERY      left    JOINT REPLACEMENT      REVISION TOTAL KNEE ARTHROPLASTY Left 2022    REVISION LEFT TOTAL KNEE ARTHROPLASTY-CHERRY; ADDUCTOR BLOCK performed by Kathie Coronado MD at [facility]         Immunization History: There is no immunization history on file for this patient.     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    Failed total knee, right, subsequent encounter T84.012D    Essential hypertension I10    Hypothyroid E03.9    Diabetes mellitus type 2 in obese (HCC) E11.69, E66.9    Acute blood loss as cause of postoperative anemia D62    Preop testing Z01.818    Coronary artery disease due to lipid rich plaque I25.10, I25.83    Class 2 obesity due to excess calories with body mass index (BMI) of 36.0 to 36.9 in adult E66.09, Z68.36    History of atrial fibrillation Z86.79    Mixed hyperlipidemia E78.2    Recurrent cellulitis of lower extremity L03.119    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       Isolation/Infection:   Isolation            No Isolation          Patient Infection Status       None to display            Nurse Assessment:  Last Vital Signs: /66   Pulse 62   Temp 97.2 °F (36.2 °C) (Temporal)   Resp 16   Ht 6' (1.829 m)   Wt 270 lb (122.5 kg)   SpO2 95%   BMI 36.62 kg/m²     Last documented pain score (0-10 scale): Pain Level: 6  Last Weight:   Wt Readings from Last 1 Encounters:   03/09/23 270 lb (122.5 kg)     Mental Status:  {IP PT MENTAL STATUS:20030}    IV Access:  { CAYLA IV ACCESS:810019978}    Nursing Mobility/ADLs:  Walking   {[facility] DME HWNJ:613809333}  Transfer  {[facility] DME GGUV:715480759}  Bathing  {[facility] DME KPIO:990163472}  Dressing  {[facility] DME ZXWV:047553711}  Toileting  {[facility] DME EBTI:942599214}  Feeding  {[facility] DME NSRK:491657010}  Med Admin  {[facility] DME WZIM:162967426}  Med Delivery   { CAYLA MED Delivery:563437456}    Wound Care Documentation and Therapy:  Incision 07/14/22 Knee Anterior; Left (Active)   Dressing Status Clean;Dry; Intact 03/09/23 2140   Dressing/Treatment ABD pad; Ace wrap 03/09/23 2140   Number of days: 238        Elimination:  Continence: Bowel: {YES / RU:05299}  Bladder: {YES / AB:39928}  Urinary Catheter: {Urinary Catheter:848755674}   Colostomy/Ileostomy/Ileal Conduit: {YES / VN:13199}       Date of Last BM: ***    Intake/Output Summary (Last 24 hours) at 3/10/2023 0846  Last data filed at 3/9/2023 2147  Gross per 24 hour   Intake 2240 ml   Output 500 ml   Net 1740 ml     I/O last 3 completed shifts:   In: 8927 [P.O.:480; I.V.:1700; IV Piggyback:60]  Out: 500 [Urine:400; Blood:100]    Safety Concerns:     [address] [facility] Concerns:365819423}    Impairments/Disabilities:      [address] [employer] CAYLA Impairments/Disabilities:677121030}    Nutrition Therapy:  Current Nutrition Therapy:   [address] [facility] Diet List:157043081}    Routes of Feeding: {CHP DME Other Feedings:389791348}  Liquids: {Slp liquid thickness:81622}  Daily Fluid Restriction: {CHP DME Yes amt example:779064169}  Last Modified Barium Swallow with Video (Video Swallowing Test): {Done Not Done RCQU:534512411}    Treatments at the Time of Hospital Discharge:   Respiratory Treatments: ***  Oxygen Therapy:  {Therapy; copd oxygen:73258}  Ventilator:    [facility] Vent DJPS:559840884}    Rehab Therapies: Nursing, Physical Therapy  Weight Bearing Status/Restrictions: [address] [facility] Weight Bearin}  Other Medical Equipment (for information only, NOT a DME order):  {EQUIPMENT:382346264}  Other Treatments: HOME HEALTH CARE: LEVEL 3 SAFETY       -Initial home health evaluation to occur within 24-48 hours, in patient home   -Home health agency to establish plan of care for patient over 60 day period   -Medication Reconciliation   -PT evaluations in home within 24-48 hours of discharge; including  -DME and home safety   -Frontload therapy 5 days, then 3x a week   -PCP Visit scheduled within three to seven days of discharge      Patient's personal belongings (please select all that are sent with patient):  {CHP DME Belongings:033240860}    RN SIGNATURE:  {Esignature:851864862}    CASE MANAGEMENT/SOCIAL WORK SECTION    Inpatient Status Date: ***    Readmission Risk Assessment Score:  Readmission Risk              Risk of Unplanned Readmission:  16           Discharging to Facility/ Agency   Name: Andrew Ontiveros will call for Appointment  Phone: [phone]  Fax: [phone]     Infusion Agency  Name: [facility] P[facility]  Address:  [address] [place] [address]  Phone: [phone]  Fax:    / signature: Electronically signed by Christiano Ventura Ghazala Hanley RN on 3/13/23 at 2:41 PM EDT    PHYSICIAN SECTION    Prognosis: Good    Condition at Discharge: Stable    Rehab Potential (if transferring to Rehab): Good    Recommended Labs or Other Treatments After Discharge:                             Out-patient antibiotic therapy (OPAT)/ Antibiotic Infusion orders          Diagnosis: Left knee prosthetic joint infection       Organism/ culture: Corynebacterium striatum     Name and dose of Antimicrobial: IV vancomycin 750 mg every 24 hours, p.o. linezolid 600 mg every 12 hours     Antimicrobial start date: calculated from 3/13/2023     Antimicrobial completion date planned: Stop date for oral linezolid will be 3/23/2023 and stop date for IV vancomycin will be April 28, 2023       Lab monitoring: CBC, Chem 7 ESR, CRP, vancomycin trough once a week, to be collected every Monday or Tuesday morning until the patient is off IV  antibiotics. Fax weekly lab results to Irineo Mendoza MD's office at        [phone]. Please maintain PICC line until the patient is on IV antibiotics. Ok to administer PICC line normal saline flushes as needed. The patient has given verbal informed consent for [facility] pharmacist, Kalyn Perry to manage above antibiotic therapy for the patient after the patient's discharge from the hospital.       ** Please notify Irineo Mendoza MD's office with any change in patient's        status, transfer out of facility or to hospital by calling [phone]           Outpatient Follow up: Plan for a follow-up in-person or virtual video visit with me in 4-5 weeks. Call my office at [phone] to make an appointment.              Physician Signature:  Electronically signed by Irineo Mendoza MD on                                                      3/13/23 at 12:43 PM EDT        Physician Certification: I certify the above information and transfer of Ana Petit  is necessary for the continuing treatment of the diagnosis listed and that he requires Home Care for less than 30 days.      Update Admission H&P: No change in H&P    PHYSICIAN SIGNATURE:  Manjit Mejia MD/ Roseann Kothari LPN on 59/94/13

## 2023-03-10 NOTE — FLOWSHEET NOTE
03/09/23 2140   Assessment   Charting Type Admission   Psychosocial   Psychosocial (WDL) WDL   Neurological   Neuro (WDL) WDL   Level of Consciousness 0   Orientation Level Oriented X4   Cognition Appropriate judgement; Appropriate safety awareness; Appropriate attention/concentration; Appropriate for developmental age   Speech Clear   R Foot Dorsiflexion Weak   L Foot Dorsiflexion Weak   R Foot Plantar Flexion Weak   L Foot Plantar Flexion Weak   RLE Motor Response Normal extension;Normal flexion; Responds to command   RLE Sensation  Full sensation   LLE Motor Response Normal extension;Normal flexion; Responds to command   LLE Sensation  Full sensation   Horatio Coma Scale   Eye Opening 4   Best Verbal Response 5   Best Motor Response 6   Horatio Coma Scale Score 15   HEENT (Head, Ears, Eyes, Nose, & Throat)   HEENT (WDL) WDL   Respiratory   Respiratory (WDL) X   Respiratory Pattern Regular   Respiratory Depth Normal   Respiratory Quality/Effort Unlabored   L Breath Sounds Diminished   R Breath Sounds Rhonchi   Breath Sounds   Right Upper Lobe Rhonchi   Right Middle Lobe Rhonchi   Right Lower Lobe Rhonchi   Left Upper Lobe Diminished   Left Lower Lobe Diminished   Cardiac   Cardiac (WDL) WDL   Gastrointestinal   Abdominal (WDL) WDL   Genitourinary   Genitourinary (WDL) WDL   Suprapubic Tenderness No   Dysuria (Pain/Burning w/Urination) No   Difficulty Urinating/Starting Stream No   Peripheral Vascular   Peripheral Vascular (WDL) X   Edema Right upper extremity; Left upper extremity; Left lower extremity   RUE Edema Non-pitting   LUE Edema Non-pitting   RLE Edema +2   LLE Edema +2   RLE Neurovascular Assessment   Capillary Refill Less than/Equal to 3 seconds   Color Appropriate for Ethnicity   Temperature Warm   R Pedal Pulse +1   LLE Neurovascular Assessment   Capillary Refill Less than/Equal to 3 seconds   Color Appropriate for Ethnicity   Temperature Warm   L Pedal Pulse +1   Skin Integumentary    Skin Integumentary (WDL) X   Skin Condition/Temp Warm   Skin Integrity Incision (see LDA)   Location left knee   Multiple Skin Integrity Sites Yes   Skin Integrity Site 2   Skin Integrity Location 2 Abrasion   Location 2 rt toes   Musculoskeletal   Musculoskeletal (WDL) X   RL Extremity Weakness   LL Extremity Weakness   Incision 07/14/22 Knee Anterior; Left   Date First Assessed/Time First Assessed: 07/14/22 1151   Present on Hospital Admission: No  Location: Knee  Incision Location Orientation: Anterior; Left   Dressing Status Clean;Dry; Intact   Dressing/Treatment ABD pad; Ace wrap

## 2023-03-10 NOTE — CARE COORDINATION
Discharge Planning Note:    Talked with Ruby Michael:    - Alec is running benefits. Will continue to follow.     CALEB Vo RN    [facility]  Phone: [phone]

## 2023-03-10 NOTE — CONSULTS
Consult -Internal Medicine  Dr. Jenn Mcmahon  3/9/2023      PCP: Chun Saenz  Referring Physician: Royer Novak, *    Code Status: Full Code  Current Diet: ADULT DIET; Regular      Cc: Екатерина Sierra is a79 y.o. male who presents with Direct infection of left knee in infectious and parasitic diseases classified elsewhere ([facility] Problem list of hospitalization thus far: Active Hospital Problems    Diagnosis Date Noted    Coronary artery disease due to lipid rich plaque [I25.10, I25.83] 03/09/2023     Priority: Medium    Class 2 obesity due to excess calories with body mass index (BMI) of 36.0 to 36.9 in adult [E66.09, Z68.36] 03/09/2023     Priority: Medium    History of atrial fibrillation [Z86.79] 03/09/2023     Priority: Medium    Mixed hyperlipidemia [E78.2] 03/09/2023     Priority: Medium    Recurrent cellulitis of lower extremity [L03.119] 03/09/2023     Priority: Medium    Infection of prosthetic left knee joint ([facility] Rondi Camps 03/09/2023     Priority: Medium    Direct infection of left knee in infectious and parasitic diseases classified elsewhere ([facility] [M01. X62] 03/09/2023     Priority: Medium    Essential hypertension [I10] 07/14/2022     Priority: Medium    Diabetes mellitus type 2 in obese ([facility] [E11.69, E66.9] 07/14/2022     Priority: Medium     HPI: Екатерина Sierra has been admitted by Royer Novak, * with L knee infection. Pt presents with with above complaint. Pt has has moderate pain to the L knee  Duration - going on for at least 4 month(s)  It is described as a constant pain that is aching in quality. Severity rated as 8 out of 10 at its worst  Pain is so severe that it limits usual activities  No improvement with medications, therapy or injections  As it is not improved with conservative measures, surgery has been recommended    Pt has undergone L knee poly exchange, irrigation without any apparent complications. Pt is doing well post operatively.   Pain is controlled at this time. I have been asked to see the patient for evaluation of his internal medicine issues:  has a past medical history of Arthritis, Atrial fibrillation ([address] CAD (coronary artery disease), Diabetes mellitus ([address] Hyperlipidemia, Hypertension, and Thyroid disease. Nando Castorena Home meds have been reveiwed and restartedas indicated. Pt denies having other complaints at this time. Pt has been evaluated post operatively  Pain does appear to be controlled - on iv meds  Patient has not worked with therapy at this time      Electronic chart reviewed  Home meds reviewed and restarted as indicated  Op note, anesthesia flowsheet reviewed  Case d/w nursing    Doing ok posto p  Pain controlled  ID eval pending      Review of Systems: (1 system for EPF, 2-9 for detailed, 10+ for comprehensive)  Constitutional: Negative for chills and fever. HENT: Negative for ear pain and mouth sores. Eyes: Negative for pain, redness and visual disturbance. Respiratory: Negative for cough, shortness of breath and wheezing. Cardiovascular: Negative for chest pain and leg swelling. Gastrointestinal: Negative for diarrhea, nausea and vomiting. Endocrine: Negative for polydipsia and polyphagia. Genitourinary: Negative for frequency and urgency. Musculoskeletal: Negative for back pain and neck pain. Positive for knee pain  Skin: Negative for color change. Allergic/Immunologic: Negative for food allergies. Neurological: Negative for seizures and syncope. Hematological: Does not bruise/bleed easily. Psychiatric/Behavioral: Negative for confusion. The patient is not nervous/anxious.              Past Medical History:   Past Medical History:   Diagnosis Date    Arthritis     Atrial fibrillation ([address]     CAD (coronary artery disease)     Diabetes mellitus ([address]     Hyperlipidemia     Hypertension     Thyroid disease        Past Surgical History:   Past Surgical History:   Procedure Laterality Date    BACK SURGERY      cervical   lumbar    CARDIAC SURGERY      bypassx4    CAROTID ENDARTERECTOMY      FOOT SURGERY      left    JOINT REPLACEMENT      REVISION TOTAL KNEE ARTHROPLASTY Left 7/14/2022    REVISION LEFT TOTAL KNEE ARTHROPLASTY-CHERRY; ADDUCTOR BLOCK performed by Naida Badillo MD at [address] History:   Social History       Tobacco History       Smoking Status  Never      Smokeless Tobacco Use  Never              Alcohol History       Alcohol Use Status  Yes Comment  soc              Drug Use       Drug Use Status  Never              Sexual Activity       Sexually Active  Not Asked                    Fam History: History reviewed. No pertinent family history. PFSH: The above PMHx, PSHx, SocHx, FamHx has been reviewed by myself. (1 area for detailed, 2-3 for comprehensive)      Code Status: Full Code    Meds - following list ofhome medications from electronic chart has been reviewed by myself  Prior to Admission medications    Medication Sig Start Date End Date Taking? Authorizing Provider   oxyCODONE (ROXICODONE) 5 MG immediate release tablet Take 1 tablet by mouth every 6 hours as needed for Pain for up to 7 days. Intended supply: 7 days.  Take lowest dose possible to manage pain Max Daily Amount: 20 mg 3/9/23 3/16/23 Yes Luigi Ramírez   docusate sodium (COLACE) 100 MG capsule Take 1 capsule by mouth 2 times daily 3/9/23  Yes Luigi Andrade   acetaminophen (TYLENOL) 500 MG tablet Take 2 tablets by mouth 3 times daily 7/14/22   Luigi Andrade   apixaban (ELIQUIS) 5 MG TABS tablet Take by mouth 2 times daily    Historical Provider, MD   losartan (COZAAR) 50 mg tablet Take 50 mg by mouth daily    Historical Provider, MD   amLODIPine (NORVASC) 2.5 MG tablet Take 2.5 mg by mouth at bedtime    Historical Provider, MD   levothyroxine (SYNTHROID) 125 MCG tablet Take 125 mcg by mouth Daily    Historical Provider, MD atorvastatin (LIPITOR) 40 MG tablet Take 40 mg by mouth at bedtime    Historical Provider, MD   gabapentin (NEURONTIN) 300 MG capsule Take 300 mg by mouth 4 times daily.     Historical Provider, MD   torsemide (DEMADEX) 10 MG tablet Take 10 mg by mouth daily    Historical Provider, MD   omeprazole (PRILOSEC) 20 MG delayed release capsule Take 20 mg by mouth daily    Historical Provider, MD   SITagliptin (JANUVIA) 100 MG tablet Take 100 mg by mouth daily    Historical Provider, MD   insulin glargine (LANTUS) 100 UNIT/ML injection vial Inject into the skin nightly Depends on blood sugar    35-40units    Historical Provider, MD   insulin lispro (HUMALOG) 100 UNIT/ML SOLN injection vial Inject into the skin 3 times daily (with meals) 10units-12units    Historical Provider, MD   Calcium Polycarbophil (FIBER-CAPS PO) Take by mouth    Historical Provider, MD   Fluticasone Propionate (FLONASE NA) by Nasal route    Historical Provider, MD   Probiotic Product (PROBIOTIC-10 PO) Take by mouth    Historical Provider, MD   aspirin 81 MG EC tablet Take 81 mg by mouth daily    Historical Provider, MD         No Known Allergies          EXAM: (2-7 system forEPF/Detailed, ?8 for Comprehensive)  /78   Pulse 69   Temp 97.5 °F (36.4 °C) (Oral)   Resp 12   Ht 6' (1.829 m)   Wt 270 lb (122.5 kg)   SpO2 96%   BMI 36.62 kg/m²   Constitutional: vitals as above: alert, appears stated age and cooperative    Psychiatric: normal insight and judgment, oriented to person, place, time, and general circumstances    Head: Normocephalic, without obvious abnormality, atraumatic    Eyes:lids and lashes normal, conjunctivae and sclerae normal and pupils equal, round, reactive to light and accomodation    EMNT: external ears normal, nares midline    Neck: no carotid bruit, supple, symmetrical, trachea midline and thyroid not enlarged, symmetric, no tenderness/mass/nodules     Respiratory: clear to auscultation and percussion bilaterally with normal respiratory effort    Cardiovascular: normal rate, regular rhythm, normal S1 and S2 and no murmurs    Gastrointestinal: soft, non-tender, non-distended, normal bowel sounds, no masses or organomegaly    Extremities: no clubbing, no edema - dressing  L knee CDI   Skin:No rashes or nodules noted. Neurologic:negative           LABS:  Labs Reviewed   APTT - Abnormal; Notable for the following components:       Result Value    aPTT 35.8 (*)     All other components within normal limits   SEDIMENTATION RATE - Abnormal; Notable for the following components:    Sed Rate 28 (*)     All other components within normal limits   CBC WITH AUTO DIFFERENTIAL - Abnormal; Notable for the following components:    WBC 3.4 (*)     RBC 2.90 (*)     Hemoglobin 10.0 (*)     Hematocrit 30.2 (*)     .0 (*)     MCH 34.3 (*)     RDW 21.0 (*)     Lymphocytes Absolute 0.7 (*)     All other components within normal limits   POCT GLUCOSE - Abnormal; Notable for the following components:    POC Glucose 106 (*)     All other components within normal limits   POCT GLUCOSE - Abnormal; Notable for the following components:    POC Glucose 119 (*)     All other components within normal limits   CULTURE, AERORBIC AND ANAROBIC, JOINT    Narrative:     ORDER#: [id number]                          ORDERED BY: Nidhi Keys  SOURCE: Knee left knee                     COLLECTED:  03/09/23 15:05  ANTIBIOTICS AT LISS.:                      RECEIVED :  03/09/23 15:32   CULTURE WITH SMEAR, ACID FAST BACILLIUS   CULTURE, FUNGUS   PROTIME-INR   CELL COUNT WITH DIFFERENTIAL, BODY FLUID    Narrative:     synovial Fluid left knee   C-REACTIVE PROTEIN   CK   COMPREHENSIVE METABOLIC PANEL   HEMOGLOBIN AND HEMATOCRIT   TYPE AND SCREEN         IMAGING:  Imaging has been reviewed in the computerized chart.   Independently reviewed tests as noted separately within this note   XR KNEE LEFT (1-2 VIEWS)    Result Date: 3/9/2023  EXAMINATION: 4 XRAY VIEWS OF THE LEFT KNEE 3/9/2023 6:02 pm COMPARISON: 07/14/2022. HISTORY: ORDERING SYSTEM PROVIDED HISTORY: post op film TECHNOLOGIST PROVIDED HISTORY: Of operative side while in recovery room. Reason for exam:->post op film Reason for Exam: post op films FINDINGS: There is a metallic prosthesis in the distal femur and proximal tibia which are unchanged and are held in place methylmethacrylate. There are postop changes along the patella which is unchanged. There are numerous rounded densities consistent with bone grafts seen along the diametaphyseal region of the distal femur bilaterally extending inferiorly around the joint and proximal tibia which is more apparent. There are surgical clips in the soft tissues laterally. There is moderate edema and subcutaneous air in the soft tissues anteriorly extending into the suprapatellar bursa. Previous total left knee replacement which is unchanged in position with bone graft material seen scattered along the distal femur and proximal tibia. There are extensive postop changes in the soft tissues anteriorly and extending into the suprapatellar bursa which is more prominent. EKG: reviewed if available      Lab Results   Component Value Date/Time    GLUCOSE 180 02/27/2023 10:28 AM     Lab Results   Component Value Date/Time    POCGLU 119 03/09/2023 05:35 PM     /78   Pulse 69   Temp 97.5 °F (36.4 °C) (Oral)   Resp 12   Ht 6' (1.829 m)   Wt 270 lb (122.5 kg)   SpO2 96%   BMI 36.62 kg/m²     MEDICAL DECISION MAKING:    Principal Problem:    Direct infection of left knee in infectious and parasitic diseases classified elsewhere ([facility] -New Problem to me. Plan: Continue on post-operative pathway. PT/OT to see patient. Continue pain control - on IV pain meds acutely post op. Work on transitioning to oral pain meds when possible. Will order serial h/h to monitor for acute blood loss anemia. Active Problems:    Essential hypertension -Established problem. Stable. 130/78  Plan: Pt home BP meds reviewed and will be continued. IV Hydralazine ordered for control of extremely high blood pressures. Will monitor labs to assess Creat/K for possible complications of medications. Diabetes mellitus type 2 in obese [facility]) -Established problem. Stable. Glu 180  Plan: Patient placed on controlled carbohydrate diet. Fingerstick sugars to be checked to monitor for both hypoglycemia as well as hyperglycemia. Sliding scale insulin ordered. Glucagon and dextrose ordered for hypoglycemia. Patient will be continued on home medications. Hemoglobin a1c to be ordered to assess efficacy of therapy. Coronary artery disease due to lipid rich plaque    Class 2 obesity due to excess calories with body mass index (BMI) of 36.0 to 36.9 in adult -Established problem. Stable. Plan: advised to increase activity when recovered    History of atrial fibrillation    Mixed hyperlipidemia -Established problem. Stable. Plan: cont meds    Recurrent cellulitis of lower extremity  Plan: abx started; id eval requested    Infection of prosthetic left knee joint ([facility]        Case discussed with: joann bangura      MDM Determination    PROBLEM  Moderate: new problem w/uncertain prognosis -    PLUS  moderate: 3+ tests reviewed/ordered, external notes reviewed, independent historian - cbc, bmp, cultures    OR TIME BASED  N/A - see problem based determination above    Thanks for the consult! Will follow along daily while patient is in house.       (Please note that portions of this note were completed with a voice recognition program.  Efforts were made to edit the dictations but occasionally words are mis-transcribed.)      Rosemary Lopez MD  3/9/2023

## 2023-03-10 NOTE — PROGRESS NOTES
Progress Note - Dr. Priscilla Chi - Internal Medicine  PCP: Angelica LO [address] [place] [id number] [phone]    Hospital Day: 1  Code Status: Full Code  Current Diet: ADULT DIET; Regular; 4 carb choices (60 gm/meal)        CC: follow up on medical issues    Subjective:   Dennie Maul is a 79 y.o. male. Pt seen and examined  Chart reviewed since last visit, labs and imaging below      Doing ok  Some bleeding to surgical site; dressing soaked thru per RN      Review of Systems: (1 system for EPF, 2-9 for detailed, 10+ for comprehensive)  Constitutional: Negative for chills and fever. HENT: Negative for dental problem, nosebleeds and rhinorrhea. Eyes: Negative for photophobia and visual disturbance. Respiratory: Negative for cough, chest tightness and shortness of breath. Cardiovascular: Negative for chest pain and leg swelling. Gastrointestinal: Negative for diarrhea, nausea and vomiting. Endocrine: Negative for polydipsia and polyphagia. Genitourinary: Negative for frequency, hematuria and urgency. Musculoskeletal: Negative for back pain and myalgias. Positive for kneep ain  Skin: Negative for rash. Allergic/Immunologic: Negative for food allergies. Neurological: Negative for dizziness, seizures, syncope and facial asymmetry. Hematological: Negative for adenopathy. Psychiatric/Behavioral: Negative for dysphoric mood. The patient is not nervous/anxious. I have reviewed the patient's medical and social history in detail and updated the computerized patient record. To recap: He  has a past medical history of Arthritis, Atrial fibrillation ([facility] CAD (coronary artery disease), Diabetes mellitus ([facility] Hyperlipidemia, Hypertension, and Thyroid disease. Tanda Bun He  has a past surgical history that includes Cardiac surgery; Carotid endarterectomy; sinus surgery; joint replacement; back surgery;  Foot surgery; and Revision total knee arthroplasty (Left, 7/14/2022). Author Ariana He  reports that he has never smoked. He has never used smokeless tobacco. He reports current alcohol use. He reports that he does not use drugs. .        Active Hospital Problems    Diagnosis Date Noted    Coronary artery disease due to lipid rich plaque [I25.10, I25.83] 03/09/2023     Priority: Medium    Class 2 obesity due to excess calories with body mass index (BMI) of 36.0 to 36.9 in adult [E66.09, Z68.36] 03/09/2023     Priority: Medium    History of atrial fibrillation [Z86.79] 03/09/2023     Priority: Medium    Mixed hyperlipidemia [E78.2] 03/09/2023     Priority: Medium    Recurrent cellulitis of lower extremity [L03.119] 03/09/2023     Priority: Medium    Infection of prosthetic left knee joint ([facility] Andrea Agustin 03/09/2023     Priority: Medium    Direct infection of left knee in infectious and parasitic diseases classified elsewhere ([facility] [M01. X62] 03/09/2023     Priority: Medium    Essential hypertension [I10] 07/14/2022     Priority: Medium    Diabetes mellitus type 2 in obese ([facility] [E11.69, E66.9] 07/14/2022     Priority: Medium       Current Facility-Administered Medications: midazolam PF (VERSED) injection 2 mg, 2 mg, IntraVENous, Once  amLODIPine (NORVASC) tablet 2.5 mg, 2.5 mg, Oral, Nightly  apixaban (ELIQUIS) tablet 5 mg, 5 mg, Oral, BID  aspirin EC tablet 81 mg, 81 mg, Oral, Daily  [Held by provider] atorvastatin (LIPITOR) tablet 40 mg, 40 mg, Oral, Nightly  gabapentin (NEURONTIN) capsule 300 mg, 300 mg, Oral, 4x Daily  levothyroxine (SYNTHROID) tablet 125 mcg, 125 mcg, Oral, Daily  losartan (COZAAR) tablet 50 mg, 50 mg, Oral, Daily  pantoprazole (PROTONIX) tablet 40 mg, 40 mg, Oral, QAM AC  torsemide (DEMADEX) tablet 10 mg, 10 mg, Oral, Daily  0.9 % sodium chloride infusion, , IntraVENous, Continuous  sodium chloride flush 0.9 % injection 5-40 mL, 5-40 mL, IntraVENous, 2 times per day  sodium chloride flush 0.9 % injection 5-40 mL, 5-40 mL, IntraVENous, PRN  0.9 % sodium chloride infusion, , IntraVENous, PRN  acetaminophen (TYLENOL) tablet 1,000 mg, 1,000 mg, Oral, 3 times per day  oxyCODONE (ROXICODONE) immediate release tablet 5 mg, 5 mg, Oral, Q4H PRN **OR** oxyCODONE (ROXICODONE) immediate release tablet 10 mg, 10 mg, Oral, Q4H PRN  HYDROmorphone HCl PF (DILAUDID) injection 0.25 mg, 0.25 mg, IntraVENous, Q3H PRN **OR** HYDROmorphone HCl PF (DILAUDID) injection 0.5 mg, 0.5 mg, IntraVENous, Q3H PRN  bisacodyl (DULCOLAX) suppository 10 mg, 10 mg, Rectal, Daily PRN  DAPTOmycin (CUBICIN) 450 mg in sodium chloride 0.9 % 50 mL IVPB, 6 mg/kg (Ideal), IntraVENous, Q24H  alogliptin (NESINA) tablet 12.5 mg, 12.5 mg, Oral, Daily  hydrALAZINE (APRESOLINE) injection 10 mg, 10 mg, IntraVENous, Q6H PRN  0.9 % sodium chloride bolus, 500 mL, IntraVENous, PRN  potassium chloride (KLOR-CON M) extended release tablet 40 mEq, 40 mEq, Oral, PRN **OR** potassium bicarb-citric acid (EFFER-K) effervescent tablet 40 mEq, 40 mEq, Oral, PRN **OR** potassium chloride 10 mEq/100 mL IVPB (Peripheral Line), 10 mEq, IntraVENous, PRN  insulin lispro (HUMALOG) injection vial 0-8 Units, 0-8 Units, SubCUTAneous, TID WC  insulin lispro (HUMALOG) injection vial 0-4 Units, 0-4 Units, SubCUTAneous, Nightly  dextrose bolus 10% 125 mL, 125 mL, IntraVENous, PRN **OR** dextrose bolus 10% 250 mL, 250 mL, IntraVENous, PRN  glucagon (rDNA) injection 1 mg, 1 mg, SubCUTAneous, PRN  dextrose 10 % infusion, , IntraVENous, Continuous PRN         Objective:  /66   Pulse 62   Temp 97.2 °F (36.2 °C) (Temporal)   Resp 16   Ht 6' (1.829 m)   Wt 270 lb (122.5 kg)   SpO2 95%   BMI 36.62 kg/m²      Patient Vitals for the past 24 hrs:   BP Temp Temp src Pulse Resp SpO2 Height Weight   03/10/23 0829 108/66 97.2 °F (36.2 °C) Temporal 62 16 95 % -- --   03/10/23 0530 130/72 98.1 °F (36.7 °C) Temporal 56 16 96 % -- --   03/10/23 0002 -- -- -- 72 16 96 % -- --   03/09/23 2357 (!) 143/79 (!) 96.7 °F (35.9 °C) Temporal 74 15 95 % -- --   03/09/23 2145 -- -- -- -- -- -- 6' (1.829 m) --   03/09/23 1858 130/78 97.5 °F (36.4 °C) Oral 69 12 96 % -- --   03/09/23 1830 119/79 -- -- 69 11 98 % -- --   03/09/23 1815 115/72 -- -- 71 17 96 % -- --   03/09/23 1800 124/80 -- -- 73 12 96 % -- --   03/09/23 1745 113/80 -- -- 70 (!) 9 95 % -- --   03/09/23 1730 124/73 -- -- 74 13 97 % -- --   03/09/23 1715 121/78 -- -- 71 13 97 % -- --   03/09/23 1700 118/78 -- -- 73 14 94 % -- --   03/09/23 1645 130/74 98.1 °F (36.7 °C) Temporal 74 16 100 % -- --   03/09/23 1640 125/78 -- -- 76 16 100 % -- --   03/09/23 1635 119/77 -- -- 77 17 100 % -- --   03/09/23 1631 119/77 98.1 °F (36.7 °C) Temporal 83 12 100 % -- --   03/09/23 1313 128/69 97 °F (36.1 °C) Temporal (!) 104 18 100 % -- 270 lb (122.5 kg)     Patient Vitals for the past 96 hrs (Last 3 readings):   Weight   03/09/23 1313 270 lb (122.5 kg)           Intake/Output Summary (Last 24 hours) at 3/10/2023 0848  Last data filed at 3/9/2023 2147  Gross per 24 hour   Intake 2240 ml   Output 500 ml   Net 1740 ml         Physical Exam: (2-7 system for EPF/Detailed, ?8 for Comprehensive)  /66   Pulse 62   Temp 97.2 °F (36.2 °C) (Temporal)   Resp 16   Ht 6' (1.829 m)   Wt 270 lb (122.5 kg)   SpO2 95%   BMI 36.62 kg/m²   Constitutional: vitals as above: alert, appears stated age and cooperative    Psychiatric: normal insight and judgment, oriented to person, place, time, and general circumstances    Head: Normocephalic, without obvious abnormality, atraumatic    Eyes:lids and lashes normal, conjunctivae and sclerae normal and pupils equal, round, reactive to light and accomodation    EMNT: external ears normal, nares midline    Neck: no carotid bruit, supple, symmetrical, trachea midline and thyroid not enlarged, symmetric, no tenderness/mass/nodules     Respiratory: clear to auscultation and percussion bilaterally with normal respiratory effort    Cardiovascular: normal rate, regular rhythm, normal S1 and S2 and no murmurs    Gastrointestinal: soft, non-tender, non-distended, normal bowel sounds, no masses or organomegaly    Extremities: no clubbing, no edema  ; L knee dressing bloody  Skin:No rashes or nodules noted. Neurologic:negative         Labs:  Lab Results   Component Value Date    WBC 4.7 03/10/2023    HGB 9.9 (L) 03/10/2023    HCT 30.3 (L) 03/10/2023     03/10/2023    ALT 58 (H) 03/09/2023    AST 92 (H) 03/09/2023     03/10/2023    K 4.9 03/10/2023     03/10/2023    CREATININE 1.6 (H) 03/10/2023    BUN 33 (H) 03/10/2023    CO2 27 03/10/2023    INR 1.13 03/09/2023    LABA1C 7.5 02/27/2023    LABMICR Not Indicated 06/22/2022     Lab Results   Component Value Date    CKTOTAL 1,061 (H) 03/09/2023       Recent Imaging Results are Reviewed:  XR KNEE LEFT (1-2 VIEWS)    Result Date: 3/9/2023  EXAMINATION: 4 XRAY VIEWS OF THE LEFT KNEE 3/9/2023 6:02 pm COMPARISON: 07/14/2022. HISTORY: ORDERING SYSTEM PROVIDED HISTORY: post op film TECHNOLOGIST PROVIDED HISTORY: Of operative side while in recovery room. Reason for exam:->post op film Reason for Exam: post op films FINDINGS: There is a metallic prosthesis in the distal femur and proximal tibia which are unchanged and are held in place methylmethacrylate. There are postop changes along the patella which is unchanged. There are numerous rounded densities consistent with bone grafts seen along the diametaphyseal region of the distal femur bilaterally extending inferiorly around the joint and proximal tibia which is more apparent. There are surgical clips in the soft tissues laterally. There is moderate edema and subcutaneous air in the soft tissues anteriorly extending into the suprapatellar bursa. Previous total left knee replacement which is unchanged in position with bone graft material seen scattered along the distal femur and proximal tibia.  There are extensive postop changes in the soft tissues anteriorly and extending into the suprapatellar bursa which is more prominent. Lab Results   Component Value Date/Time    GLUCOSE 249 03/10/2023 04:31 AM     Lab Results   Component Value Date/Time    POCGLU 258 03/10/2023 07:28 AM     /66   Pulse 62   Temp 97.2 °F (36.2 °C) (Temporal)   Resp 16   Ht 6' (1.829 m)   Wt 270 lb (122.5 kg)   SpO2 95%   BMI 36.62 kg/m²     Assessment and Plan:  Principal Problem:    Direct infection of left knee in infectious and parasitic diseases classified elsewhere ([facility] -Established problem. Stable. Some bleeding to dressing  Plan: stay on post op pathway. To work with pt/ot today. Transition to oral pain meds. Cont to follow h/h to assess post op anemia. Active Problems:    Essential hypertension -Established problem. Stable. 108/66  Plan: Continue medications. Continue to check BP q shift. CBC and BMP ordered to monitor for disease progression, medication side effect. Diabetes mellitus type 2 in [facility]) -Established problem. Stable. Glu 249  Plan: Continue on CCC diet, home medications. CBC and BMP ordered to monitor sugars and for other medication complications. Fingerstick glucose ordered to check for alterations in levels throughout day. Sliding scale insulin ordered to cover fingerstick levels. Coronary artery disease due to lipid rich plaque    Class 2 obesity due to excess calories with body mass index (BMI) of 36.0 to 36.9 in adult    History of atrial fibrillation    Mixed hyperlipidemia  Plan: cont on home meds    Recurrent cellulitis of lower extremity    Infection of prosthetic left knee joint ([facility]  Plan: on iv abx per ID.  Awaiting cx results      Case discussed with: ortho  Tests ordered/reviewed: cbc, bmp, cultures          (Please note that portions of this note were completed with a voice recognition program.  Efforts were made to edit the dictations but occasionally words are mis-transcribed.)        Kim Jensen MD  3/10/2023

## 2023-03-10 NOTE — PROGRESS NOTES
Department of Orthopedic Surgery     Progress Note    Subjective:   Admit Day:3/9/2023    Patient is comfortable appearing. Denies chest pain, shortness of air or calf pain. Tolerating PO. Objective[de-identified]    height is 6' (1.829 m) and weight is 270 lb (122.5 kg). His temporal temperature is 98.1 °F (36.7 °C). His blood pressure is 130/72 and his pulse is 56. His respiration is 16 and oxygen saturation is 96%. Dressing cdi, calf soft, neg mary, nvi      Labs:  Lab Results   Component Value Date/Time    WBC 4.7 03/10/2023 04:31 AM    HGB 9.9 03/10/2023 04:31 AM    HCT 30.3 03/10/2023 04:31 AM     03/10/2023 04:31 AM       Lab Results   Component Value Date/Time    INR 1.13 03/09/2023 01:00 PM       Lab Results   Component Value Date/Time     03/10/2023 04:31 AM    K 4.9 03/10/2023 04:31 AM    CO2 27 03/10/2023 04:31 AM    BUN 33 03/10/2023 04:31 AM    CREATININE 1.6 03/10/2023 04:31 AM    CALCIUM 8.7 03/10/2023 04:31 AM       Assessment:   Principal Problem:    Direct infection of left knee in infectious and parasitic diseases classified elsewhere [facility])  Active Problems:    Essential hypertension    Diabetes mellitus type 2 in obese ([facility]    Coronary artery disease due to lipid rich plaque    Class 2 obesity due to excess calories with body mass index (BMI) of 36.0 to 36.9 in adult    History of atrial fibrillation    Mixed hyperlipidemia    Recurrent cellulitis of lower extremity    Infection of prosthetic left knee joint ([facility]  Resolved Problems:    * No resolved hospital problems.  *      s/p I&D, synovectomy poly exchange      Plan:   Cont w pathway    Dc planning home when medically stable per Dr Jenn Richardson with IV abx per ID    Ace wrap to stay on until 2 week post op visit  Knee immobilizer when oob    Fu w ortho 2 weeks

## 2023-03-10 NOTE — PROGRESS NOTES
Patient admitted to room 70 from ed. Patient oriented to room, call light, bed rails, phone, lights and bathroom. Patient instructed about the schedule of the day including: vital sign frequency, lab draws, possible tests, frequency of MD and staff rounds, including RN/MD rounding together at bedside, daily weights, and I &O's. Patient instructed about prescribed diet, how to use 8MENU, and television. bed alarm in place, patient aware of placement and reason. Telemetry box  in place, patient aware of placement and reason. Bed locked, in lowest position, side rails up 2/4, call light within reach. Will continue to monitor.

## 2023-03-10 NOTE — PROGRESS NOTES
[facility] [address] Department   Phone: [phone]    Occupational Therapy    [x] Initial Evaluation            [] Daily Treatment Note         [] Discharge Summary      Patient: Amarilys Griffin   : 1940   MRN: [id number]   Date of Service:  3/10/2023    Admitting Diagnosis:  Direct infection of left knee in infectious and parasitic diseases classified elsewhere [facility])  Current Admission Summary: The pt was admited with a L knee infection. S/p I&D with polyethylene exchange and antibiotic bead placement on 3/9. Past Medical History:  has a past medical history of Arthritis, Atrial fibrillation ([facility] CAD (coronary artery disease), Diabetes mellitus ([facility] Hyperlipidemia, Hypertension, and Thyroid disease. Past Surgical History:  has a past surgical history that includes Cardiac surgery; Carotid endarterectomy; sinus surgery; joint replacement; back surgery; Foot surgery; Revision total knee arthroplasty (Left, 2022); Knee Arthrotomy (Left, 3/9/2023); and Revision total knee arthroplasty (Left, 3/9/2023). Discharge Recommendations: Amarilys Griffin scored a 18/24 on the AM-PAC ADL Inpatient form. Current research shows that an AM-PAC score of 18 or greater is typically associated with a discharge to the patient's home setting. Based on the patient's AM-PAC score, and their current ADL deficits, it is recommended that the patient have 2-3 sessions per week of Occupational Therapy at d/c to increase the patient's independence. At this time, this patient demonstrates the endurance and safety to discharge home with home based OT (home vs OP services) and a follow up treatment frequency of 2-3x/wk. Please see assessment section for further patient specific details. If patient discharges prior to next session this note will serve as a discharge summary. Please see below for the latest assessment towards goals.    HOME HEALTH CARE: LEVEL 3 SAFETY     - Initial home health evaluation to occur within 24-48 hours, in patient home   - Therapy evaluations in home within 24-48 hours of discharge; including DME and home safety   - Frontload therapy 5 days, then 3x a week   - Therapy to evaluate if patient has [address] needs for personal care   -  evaluation within 24-48 hours, includes evaluation of resources and insurance to determine [place], [place], LTC, and Medicaid options       DME Required For Discharge: patient has all required DME for discharge    Precautions/Restrictions: high fall risk  Weight Bearing Restrictions: weight bearing as tolerated  [] Right Upper Extremity  [] Left Upper Extremity [] Right Lower Extremity  [] Left Lower Extremity     Required Braces/Orthotics: knee immobilizer   [x] Right  [] Left  Positional Restrictions:no positional restrictions    Pre-Admission Information   Lives With: spouse                  Type of Home: house  Home Layout: one level  Home Access: level entry  [facility] 45: walk in shower, . Comment: bathroom not accessible with RW  Toilet Height: elevated height  Bathroom Equipment: grab bars in shower, shower chair, hand held shower head  Home Equipment: rollator - 4 wheeled walker, rolling walker  Transfer Assistance: Mod I with [id number]  Ambulation Assistance: modified independent with use of 4WW d/t pain  ADL Assistance: Independent with ADLs  IADL Assistance: requires assistance with all homemaking tasks, requires assistance with meal prep, requires assistance with laundry, requires assistance with vacuuming, requires assistance with cleaning, pt empties , pt gets his own breakfast and lunch  Active :        [x] Yes                 [] No--does not drive at night or in the rain. Current Employment: retired--. Hobbies: hang out with friends and go to [place]: No recent falls since x1 year ago.       Comment: pt battling cellulitis in LLE    Examination   Vision:   Vision Gross Assessment: Impaired and Vision Corrective Device: wears glasses at all times  Pt with diabetic retinopathy and extremely poor vision in Right eye. Hearing:   WFL  Observation:   General Observation:  audible crackling in breathing, pt stated this is his baseline, O2 sat 96% on room air  Posture:   Good  Sensation:   WFL  Proprioception:    WFL  Tone:   Normotonic  Coordination Testing:   WFL      ROM:   (B) Shoulder AROM WFL  Strength:   (B) UE strength grossly WFL    Therapist Clinical Decision Making (Complexity): low complexity  Clinical Presentation: stable      Subjective  General: Pt sitting in recliner upon arrival. Flat affect. Spouse present. Pt pleasant and agreeable to PT/OT evaluations. Baseline/chronic cough notable. Not productive. Pain: 3/10. Location: R knee  Pain Interventions: pain medication in place prior to arrival and repositioned         Activities of Daily Living  Basic Activities of Daily Living  Lower Extremity Dressing: setup assistance moderate assistance Comment: Mod A to thread over R knee immobilizer; Min A to pull up and adjust from knees to hips in stance. Toileting: contact guard assistance Increased time to complete task Comment: pt attempting to urinate in stance at commode. Increased difficulty with accuracy due to walker as an obstacle. Pt accidentally urinated on floor, missing commode. Encouraged pt to complete all toileting in sitting for safety and effectiveness. .    Comment: pt declines other ADLs this session. Instrumental Activities of Daily Living  No IADL completed on this date. Functional Mobility  Bed Mobility  Bed mobility not completed on this date. Comments:  Transfers  Sit to stand transfer:minimal assistance, moderate assistance, 2 person assistance with Min A + Mod A from recliner x3 trials. Difficulty standing due to R knee in immobilizer. Stand to sit transfer: contact guard assistance  Comments: Cues for hand placement.    Functional Mobility:  Sitting Balance: Independent. Standing Balance: contact guard assistance. Functional Mobility: .  contact guard assistance  Functional Mobility Activity: to/from bathroom, pt also ambulated in unit hallway with RW CGA. Comment: No SOB or LOB noted. Decreased jackson. See PT notes for further details regarding gait quality and distance ambulated. Other Therapeutic Interventions    Functional Outcomes  AM-PAC Inpatient Daily Activity Raw Score: 18    Cognition  WFL  Orientation:    alert and oriented x 4  Command Following:   [employer]     Education  Barriers To Learning: none  Patient Education: patient educated on goals, OT role and benefits, plan of care, ADL adaptive strategies, IADL safety, adaptive device training, transfer training, discharge recommendations  Learning Assessment:  patient verbalizes understanding, would benefit from continued reinforcement    Assessment  Activity Tolerance: Pt tolerated treatment well. Impairments Requiring Therapeutic Intervention: decreased functional mobility, decreased ADL status, decreased ROM, decreased endurance, decreased balance, increased pain  Prognosis: excellent  Clinical Assessment: Pt presenting below his functional baseline with the above deficits associated with s/p I&D with polyethylene exchange and antibiotic bead placement on 3/9. Continued OT indicated in order to facilitate safe and successful return to PLOF. Safety Interventions: patient left in chair, chair alarm in place, call light within reach, nurse notified, and family/caregiver present    Plan  Frequency: 7 x/week  Current Treatment Recommendations: strengthening, balance training, functional mobility training, transfer training, gait training, patient/caregiver education, ADL/self-care training, IADL training, and safety education    Goals  Patient Goals: Return to home.     Short Term Goals:  Time Frame: Discharge  Patient will complete upper body ADL at Independent   Patient will complete lower body ADL at modified independent   Patient will complete toileting at modified independent   Patient will complete grooming at Independent   Patient will complete functional transfers at modified independent     Therapy Session Time     Individual Group Co-treatment   Time In    1252   Time Out    1345   Minutes    53        Timed Code Treatment Minutes: 38 Minutes  Total Treatment Minutes:  53 minutes       Electronically Signed By: SUSIE Campos OTR/L  [id number]

## 2023-03-10 NOTE — PROGRESS NOTES
4 Eyes Skin Assessment     NAME:  Carline Law  YOB: 1940  MEDICAL RECORD NUMBER:  [id number]    The patient is being assessed for  Admission    I agree that One RN has performed a thorough Head to Toe Skin Assessment on the patient. ALL assessment sites listed below have been assessed. Areas assessed by both nurses:    Head, Face, Ears, Shoulders, Back, Chest, Arms, Elbows, Hands, Sacrum. Buttock, Coccyx, Ischium, and Legs. Feet and Heels        Does the Patient have a Wound?  No noted wound(s)       Mario Prevention initiated by RN: No   Wound Care Orders initiated by RN: No    Pressure Injury (Stage 3,4, Unstageable, DTI, NWPT, and Complex wounds) if present, place referral order by RN under : No    New and Established Ostomies, if present place, referral order under : No      Nurse 1 eSignature: Electronically signed by Katie Boone RN on 3/9/23 at 9:52 PM EST    **SHARE this note so that the co-signing nurse can place an eSignature**    Nurse 2 eSignature: Electronically signed by Allen Sandoval RN on 3/9/23 at 9:54 PM EST

## 2023-03-10 NOTE — PROGRESS NOTES
Infectious Diseases   Progress Note      Admission Date: 3/9/2023  Hospital Day: Hospital Day: 2   Attending: Shabnam Llanos, *  Date of service: 3/10/2023     Chief complaint/ Reason for consult:     Left knee prosthetic joint infection  S/p left knee surgical debridement and hardware retention with polyethylene exchange on 3/9/2023  History of index left knee arthroplasty several years ago followed by revision arthroplasty 16 years later  History of recurrent leg cellulitis    Microbiology:        I have reviewed allavailable micro lab data and cultures    Left knee surgical  culture  - collected on 3/9/2023: in process      Antibiotics and immunizations:       Current antibiotics: All antibiotics and their doses were reviewed by me    Recent Abx Admin                     vancomycin (VANCOCIN) 2,000 mg in sodium chloride 0.9 % 500 mL IVPB (mg) 2,000 mg New Bag 03/09/23 2238    DAPTOmycin (CUBICIN) 450 mg in sodium chloride 0.9 % 50 mL IVPB (mg) 450 mg New Bag 03/09/23 2126    gentamicin (GARAMYCIN) 320 mg ()  Given 03/09/23 1536    vancomycin (VANCOCIN) injection (mg) 4,000 mg Given 03/09/23 1535    ceFAZolin (ANCEF) 3000 mg in sodium chloride 0.9% 100 mL IVPB (mg) 3,000 mg Given 03/09/23 1511                      Immunization History: All immunization history was reviewed by me today. There is no immunization history on file for this patient. Known drug allergies: All allergies were reviewed and updated    No Known Allergies    Social history:     Social History:  All social andepidemiologic history was reviewed and updated by me today as needed. Tobacco use:   reports that he has never smoked. He has never used smokeless tobacco.  Alcohol use:   reports current alcohol use. Currently lives in: [address] [place] [address]   reports no history of drug use.      COVID VACCINATION AND LAB RESULT RECORDS:     Internal Administration   First Dose      Second Dose           Last COVID Lab No results found for: SARS-COV-2, SARS-COV-2 RNA, SARS-COV-2, SARS-COV-2, SARS-COV-2 BY PCR, SARS-COV-2, SARS-COV-2, SARS-COV-2         Assessment:     The patient is a 79 y.o. old male who  has a past medical history of Arthritis, Atrial fibrillation ([facility] [address] CAD (coronary artery disease), Diabetes mellitus ([facility] [address] Hyperlipidemia, Hypertension, and Thyroid disease. 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 Discussion:      The patient is afebrile surgical culture from 3/9/2023 is in process. Serum creatinine is 1.6 today    CK level was 1061 yesterday. CRP was 12.5 yesterday. ESR was 28 yesterday    Left knee surgical culture from orthopedic office from 2/22/2023 was positive for corynebacterium stratum. The organism was susceptible to daptomycin, linezolid and vancomycin. It was resistant to Bactrim and penicillin and meropenem and was not susceptible to tetracycline according to the report I received      Plan:     Diagnostic Workup:    Follow-up on surgical cultures  Continue to follow  fever curve, WBC count and blood cultures. Continue to monitor blood counts, liver and renal function. Repeat CK level every other day  Complains of productive cough. Will order sputum culture    Antimicrobials:    Patient has a high baseline CK level.   I will have to stop IV daptomycin at this time  Will order IV linezolid 600 mg every 12 hours for now for corynebacterium coverage. Needs better glycemic control  Due to risk of thrombocytopenia, IV linezolid cannot be continued for long-term  We will consider switching patient's antibiotics to IV vancomycin or IV daptomycin on Monday based on creatinine and CK levels  Left knee surgical site care  Monitor renal function closely. We will follow up on the culture results and clinical progress and will make further recommendations accordingly. Continue close vitals monitoring. Maintain good glycemic control. Fall precautions. Aspiration precautions. Continue to watch for new fever or diarrhea. DVT prophylaxis. Discussed all above with patient and RN. Discussed with his wife at bedside      Drug Monitoring:    Continue monitoring for antibiotic toxicity as follows: CBC, CMP   Continue to watch for following: new or worsening fever, new hypotension, hives, lip swelling and redness or purulence at vascular access sites. I/v access Management:    Continue to monitor i.v access sites for erythema, induration, discharge or tenderness. As always, continue efforts to minimize tubes/lines/drains as clinically appropriate to reduce chances of line associated infections. Patient education and counseling: The patient was educated in detail about the side-effects of various antibiotics and things to watch for like new rashes, lip swelling, severe reaction, worsening diarrhea, break through fever etc.  Discussed patient's condition and what to expect. All of the patient's questions were addressed in a satisfactory manner and patient verbalized understanding all instructions. Level of complexity of visit and medical decision making: High     TIME SPENT TODAY:     - Spent over  38 minutes on visit (including interval history, physical exam, review of data including labs, cultures, imaging, development and implementation of treatment plan and coordination of complex care).  More than 50 percent of this includes face-to-face time spent with the patient for counseling and coordination of care. Thank you for involving me in the care of your patient. I will continue to follow. If you have anyadditional questions, please do not hesitate to contact me. Subjective: Interval history: Interval history was obtained from chart review and patient/ RN. The patient is afebrile. He is on IV daptomycin. He is tolerating antibiotics okay     REVIEW OF SYSTEMS:      Review of Systems   Constitutional:  Negative for chills, diaphoresis and fever. HENT:  Negative for ear discharge, ear pain, postnasal drip, rhinorrhea, sinus pressure, sinus pain and sore throat. Eyes:  Negative for discharge and redness. Respiratory:  Negative for cough, shortness of breath and wheezing. Cardiovascular:  Negative for chest pain and leg swelling. Gastrointestinal:  Negative for abdominal pain, constipation, diarrhea and nausea. Endocrine: Negative for cold intolerance, heat intolerance and polydipsia. Genitourinary:  Negative for dysuria, flank pain, frequency, hematuria and urgency. Musculoskeletal:  Positive for arthralgias. Negative for back pain and myalgias. Postop pain in left knee   Skin:  Negative for rash. Allergic/Immunologic: Negative for immunocompromised state. Neurological:  Negative for dizziness, seizures and headaches. Hematological:  Does not bruise/bleed easily. Psychiatric/Behavioral:  Negative for agitation, hallucinations and suicidal ideas. The patient is not nervous/anxious. All other systems reviewed and are negative. Past Medical History: All past medical history reviewed today. Past Medical History:   Diagnosis Date    Arthritis     Atrial fibrillation ([facility] [address]     CAD (coronary artery disease)     Diabetes mellitus ([facility] [address]     Hyperlipidemia     Hypertension     Thyroid disease        Past Surgical History: All past surgical history was reviewed today.     Past Surgical History:   Procedure Laterality Date    BACK SURGERY      cervical   lumbar    CARDIAC SURGERY      bypassx4    CAROTID ENDARTERECTOMY      FOOT SURGERY      left    JOINT REPLACEMENT      REVISION TOTAL KNEE ARTHROPLASTY Left 7/14/2022    REVISION LEFT TOTAL KNEE ARTHROPLASTY-CHERRY; ADDUCTOR BLOCK performed by Consuelo Campa MD at [facility]         Family History: All family history was reviewed today. History reviewed. No pertinent family history. Objective:       PHYSICAL EXAM:      Vitals:   Vitals:    03/09/23 2357 03/10/23 0002 03/10/23 0530 03/10/23 0829   BP: (!) 143/79  130/72 108/66   Pulse: 74 72 56 62   Resp: 15 16 16 16   Temp: (!) 96.7 °F (35.9 °C)  98.1 °F (36.7 °C) 97.2 °F (36.2 °C)   TempSrc: Temporal  Temporal Temporal   SpO2: 95% 96% 96% 95%   Weight:       Height:           Physical Exam  Vitals and nursing note reviewed. Constitutional:       Appearance: He is well-developed. He is not diaphoretic. Comments: The patient was seen earlier today. HENT:      Head: Normocephalic and atraumatic. Right Ear: External ear normal. There is no impacted cerumen. Left Ear: External ear normal. There is no impacted cerumen. Nose: Nose normal.      Mouth/Throat:      Mouth: Mucous membranes are moist.      Pharynx: Oropharynx is clear. No oropharyngeal exudate. Eyes:      General: No scleral icterus. Right eye: No discharge. Left eye: No discharge. Conjunctiva/sclera: Conjunctivae normal.      Pupils: Pupils are equal, round, and reactive to light. Neck:      Thyroid: No thyromegaly. Cardiovascular:      Rate and Rhythm: Normal rate and regular rhythm. Heart sounds: Normal heart sounds. No murmur heard. No friction rub. Pulmonary:      Effort: No respiratory distress. Breath sounds: No stridor. No wheezing or rales. Abdominal:      General: Bowel sounds are normal.      Palpations: Abdomen is soft. Tenderness: There is no abdominal tenderness. There is no guarding or rebound. Musculoskeletal:         General: Tenderness present. No swelling or deformity. Normal range of motion. Cervical back: Normal range of motion and neck supple. Right lower leg: No edema. Left lower leg: No edema. Comments: Surgical dressing on the left knee   Lymphadenopathy:      Cervical: No cervical adenopathy. Skin:     General: Skin is warm and dry. Coloration: Skin is not jaundiced. Findings: No bruising, erythema or rash. Neurological:      General: No focal deficit present. Mental Status: He is alert and oriented to person, place, and time. Mental status is at baseline. Motor: No abnormal muscle tone. Psychiatric:         Mood and Affect: Mood normal.         Behavior: Behavior normal.          Lines and drains: All vascular access sites are healthy with no local erythema, discharge or tenderness. Intake and output:    I/O last 3 completed shifts: In: 0 [P.O.:480; I.V.:1700; IV Piggyback:60]  Out: 500 [Urine:400; Blood:100]    Lab Data:   All available labs and old records have been reviewed by me.     CBC:  Recent Labs     03/09/23  1822 03/10/23  0431   WBC 3.4* 4.7   RBC 2.90* 2.87*   HGB 10.0* 9.9*   HCT 30.2* 30.3*    188   .0* 105.4*   MCH 34.3* 34.4*   MCHC 33.0 32.7   RDW 21.0* 21.0*        BMP:  Recent Labs     03/09/23  2205 03/10/23  0431    138   K 4.8 4.9    102   CO2 28 27   BUN 30* 33*   CREATININE 1.4* 1.6*   CALCIUM 8.5 8.7   GLUCOSE 211* 249*        Hepatic Function Panel:   Lab Results   Component Value Date/Time    ALKPHOS 108 03/09/2023 10:05 PM    ALT 58 03/09/2023 10:05 PM    AST 92 03/09/2023 10:05 PM    PROT 6.8 03/09/2023 10:05 PM    BILITOT <0.2 03/09/2023 10:05 PM    LABALBU 3.4 03/09/2023 10:05 PM       CPK:   Lab Results   Component Value Date    CKTOTAL 1,061 (H) 03/09/2023     ESR:   Lab Results   Component Value Date SEDRATE 28 (H) 03/09/2023     CRP:   Lab Results   Component Value Date    CRP 12.5 (H) 03/09/2023           Imaging: All pertinent images and reports for the current visit were reviewed by me during this visit. I reviewed the chest x-ray/CT scan/MRI images and independently interpreted the findings and results today. XR KNEE LEFT (1-2 VIEWS)   Final Result   Previous total left knee replacement which is unchanged in position with bone   graft material seen scattered along the distal femur and proximal tibia. There are extensive postop changes in the soft tissues anteriorly and   extending into the suprapatellar bursa which is more prominent. Medications: All current and past medications were reviewed.      midazolam  2 mg IntraVENous Once    amLODIPine  2.5 mg Oral Nightly    apixaban  5 mg Oral BID    aspirin  81 mg Oral Daily    [Held by provider] atorvastatin  40 mg Oral Nightly    gabapentin  300 mg Oral 4x Daily    levothyroxine  125 mcg Oral Daily    losartan  50 mg Oral Daily    pantoprazole  40 mg Oral QAM AC    torsemide  10 mg Oral Daily    sodium chloride flush  5-40 mL IntraVENous 2 times per day    acetaminophen  1,000 mg Oral 3 times per day    daptomycin (CUBICIN) IVPB  6 mg/kg (Ideal) IntraVENous Q24H    alogliptin  12.5 mg Oral Daily    insulin lispro  0-8 Units SubCUTAneous TID WC    insulin lispro  0-4 Units SubCUTAneous Nightly        sodium chloride 75 mL/hr at 03/09/23 1909    sodium chloride      dextrose         sodium chloride flush, sodium chloride, oxyCODONE **OR** oxyCODONE, HYDROmorphone **OR** HYDROmorphone, bisacodyl, hydrALAZINE, sodium chloride, potassium chloride **OR** potassium alternative oral replacement **OR** potassium chloride, dextrose bolus **OR** dextrose bolus, glucagon (rDNA), dextrose      Problem list:       Patient Active Problem List   Diagnosis Code    Failed total knee, left, 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    Failed total knee, right, subsequent encounter T84.012D    Essential hypertension I10    Hypothyroid E03.9    Diabetes mellitus type 2 in obese (HCC) E11.69, E66.9    Acute blood loss as cause of postoperative anemia D62    Preop testing Z01.818    Coronary artery disease due to lipid rich plaque I25.10, I25.83    Class 2 obesity due to excess calories with body mass index (BMI) of 36.0 to 36.9 in adult E66.09, Z68.36    History of atrial fibrillation Z86.79    Mixed hyperlipidemia E78.2    Recurrent cellulitis of lower extremity L03.119    Infection of prosthetic left knee joint (HCC) T84.54XA    Direct infection of left knee in infectious and parasitic diseases classified elsewhere ([facility] [address] M01. A92       Please note that this chart was generated using Dragon dictation software. Although every effort was made to ensure the accuracy of this automated transcription, some errors in transcription may have occurred inadvertently. If you may need any clarification, please do not hesitate to contact me through EPIC or at the phone number provided below with my electronic signature. Any pictures or media included in this note were obtained after taking informed verbal consent from the patient and with their approval to include those in the patient's medical record. Hayder Jacobs MD, MPH, EMELIA Guo  3/10/2023, 11:34 AM  [facility] [place] [facility]   [address] [place] [address], [address] ([address] [place] [address])  [place] [address]  Office: [phone]  Fax: [phone]  In-person Clinic days:  Tuesday & Thursday a.m. Virtual clinic days: Monday, Wednesday & Friday a.m.

## 2023-03-10 NOTE — PROGRESS NOTES
Incentive Spirometry education and demonstration completed by Respiratory Therapy Yes      Response to education: Excellent     Teaching Time: 5 minutes    Minimum Predicted Vital Capacity - 776 mL. Patient's Actual Vital Capacity - 1000 mL. Turning over to Nursing for routine follow-up Yes.     Comments: pt. Performed IS very well    Electronically signed by Maicol Wall RCP on 3/10/2023 at 12:07 AM

## 2023-03-10 NOTE — CARE COORDINATION
03/10/23 0852   IMM Letter   IMM Letter given to Patient/Family/Significant other/Guardian/POA/by: Patient's Spouse   IMM Letter date given: 03/10/23   IMM Letter time given: 0805       Copy given to the patient's spouse.     Pari Neely, LORENAN RN    [facility]  Phone: [phone]

## 2023-03-10 NOTE — CARE COORDINATION
[facility] home care referral. Spoke with pt and re: home care plan of care/services. Agreeable. Demographic's verified. Will follow for home care.

## 2023-03-10 NOTE — PROGRESS NOTES
2078  Call placed to Dr. Bon Guillaume office due to bleeding noted through dressing to left leg. Dressing reinforced. Waiting for any additional orders. 0689  Notified Dr. Bertin Herrera of breakthrough bleeding noted to dressing to left leg. Order received to hold eliquis and aspirin. 8522  Call received from [facility] from Dr. Bon Guillaume office. Notified to keep dressing reinforced and PA will come today to look and patient's dressing to left leg.

## 2023-03-10 NOTE — PLAN OF CARE
Problem: Chronic Conditions and Co-morbidities  Goal: Patient's chronic conditions and co-morbidity symptoms are monitored and maintained or improved  3/10/2023 0914 by Karen Fuentes RN  Outcome: Progressing  3/9/2023 2153 by Ginny Dancer, RN  Outcome: Progressing     Problem: Discharge Planning  Goal: Discharge to home or other facility with appropriate resources  Outcome: Progressing     Problem: Safety - Adult  Goal: Free from fall injury  Outcome: Progressing     Problem: ABCDS Injury Assessment  Goal: Absence of physical injury  Outcome: Progressing     Problem: Skin/Tissue Integrity  Goal: Absence of new skin breakdown  Description: 1. Monitor for areas of redness and/or skin breakdown  2. Assess vascular access sites hourly  3. Every 4-6 hours minimum:  Change oxygen saturation probe site  4. Every 4-6 hours:  If on nasal continuous positive airway pressure, respiratory therapy assess nares and determine need for appliance change or resting period.   Outcome: Progressing

## 2023-03-10 NOTE — PROGRESS NOTES
[facility] [address]   Phone: [phone]    Physical Therapy    [x] Initial Evaluation            [] Daily Treatment Note         [] Discharge Summary      Patient: Brenna Carey   : 1940   MRN: [id number]   Date of Service:  3/10/2023  Admitting Diagnosis: Direct infection of left knee in infectious and parasitic diseases classified elsewhere [facility])  Current Admission Summary: The pt was admited with a L knee infection. S/p I&D with polyethylene exchange and antibiotic bead placement on 3/9. Past Medical History:  has a past medical history of Arthritis, Atrial fibrillation ([facility] [address] CAD (coronary artery disease), Diabetes mellitus ([facility] [address] Hyperlipidemia, Hypertension, and Thyroid disease. Past Surgical History:  has a past surgical history that includes Cardiac surgery; Carotid endarterectomy; sinus surgery; joint replacement; back surgery; Foot surgery; Revision total knee arthroplasty (Left, 2022); Knee Arthrotomy (Left, 3/9/2023); and Revision total knee arthroplasty (Left, 3/9/2023). Discharge Recommendations: Brenna Carey scored a 18/24 on the AM-PAC short mobility form. Current research shows that an AM-PAC score of 18 or greater is typically associated with a discharge to the patient's home setting. Based on the patient's AM-PAC score and their current functional mobility deficits, it is recommended that the patient have 2-3 sessions per week of Physical Therapy at d/c to increase the patient's independence. At this time, this patient demonstrates the endurance and safety to discharge home with home services and a follow up treatment frequency of 2-3x/wk. Please see assessment section for further patient specific details. If patient discharges prior to next session this note will serve as a discharge summary. Please see below for the latest assessment towards goals.    HOME HEALTH CARE: LEVEL 1 STANDARD    - Initial home health evaluation to occur within 24-48 hours, in patient home   - Therapy to evaluate with goal of regaining prior level of functioning   - Therapy to evaluate if patient has [id number] needs for personal care   DME Required For Discharge: patient has all required DME for discharge  Precautions/Restrictions: high fall risk  Weight Bearing Restrictions: weight bearing as tolerated  [] Right Upper Extremity  [] Left Upper Extremity [] Right Lower Extremity  [x] Left Lower Extremity     Required Braces/Orthotics: knee immobilizer, KI when OOB, no knee ROM , quad sets, ankle pumps, transfers, gait training in knee immob   [] Right  [x] Left  Positional Restrictions:no positional restrictions    Pre-Admission Information   Lives With: spouse                  Type of Home: house  Home Layout: one level  Home Access: level entry  [facility] 45: walk in shower, . Comment: bathroom not accessible with RW  Toilet Height: elevated height  Bathroom Equipment: grab bars in shower, shower chair, hand held shower head  Home Equipment: rollator - 4 wheeled walker, rolling walker  Transfer Assistance: Mod I with [id number]  Ambulation Assistance: modified independent with use of 4WW d/t pain  ADL Assistance: Independent with ADLs  IADL Assistance: requires assistance with all homemaking tasks, requires assistance with meal prep, requires assistance with laundry, requires assistance with vacuuming, requires assistance with cleaning, pt empties , pt gets his own breakfast and lunch  Active :        [x] Yes                 [] No--does not drive at night or in the rain. Current Employment: retired--. Hobbies: hang out with friends and go to [facility] [place]: No recent falls since x1 year ago.       Comment: pt battling cellulitis in LLE    Examination   Vision:   Vision Gross Assessment: Impaired and Vision Corrective Device: wears glasses at all times  Pt with diabetic retinopathy and extremely poor vision in Right eye.  Hearing:   WFL  Observation:   General Observation:  audible crackling in breathing, pt stated this is his baseline, O2 sat 96% on room air  Posture:   Good  Sensation:   WFL  Proprioception:    WFL  Tone:   Normotonic  Coordination Testing:   WFL    ROM:   WFL RLE ROM, WFL L hip and ankle ROM, Lacking full knee extension, unable to test knee flexion  Strength:   WFL RLE strength, weakness in L knee and hip  Therapist Clinical Decision Making (Complexity): medium complexity  Clinical Presentation: stable      Subjective  General: pt was sitting in a chair upon PT arrival with no alarm on, agreeable to PT, wife present  Pain: 3/10. Location: L knee   Pain Interventions: pain medication in place prior to arrival and repositioned        Functional Mobility  Bed Mobility  Bed mobility not completed on this date. Comments:  Transfers  Sit to stand transfer: minimal assistance, moderate assistance, mod A first STS, min A next two sit to stands  Stand to sit transfer: contact guard assistance  Comments: Pt has a seat-up assist that he can use in his recliner chair at home. Ambulation  Assistive Device: rolling walker  Assistance: contact guard assistance  Distance: 30', 80'  Gait Mechanics: step to gait pattern, slow jackson, heavy UE WB on RW   Comments:    Stair Mobility  Stair mobility not completed on this date. Comments:  Wheelchair Mobility:  No w/c mobility completed on this date. Comments:  Balance  Static Sitting Balance: fair (+): maintains balance at SBA/supervision without use of UE support  Dynamic Sitting Balance: fair: maintains balance at CGA without use of UE support  Static Standing Balance: fair (-): maintains balance at CGA with use of UE support  Dynamic Standing Balance: fair (-): maintains balance at CGA with use of UE support  Comments: The pt stood to urinate with SBA for balance. He had difficulty reaching his feet for LB dressing while wearing knee immobilizer.     Other Therapeutic Interventions  Quad sets x10 LLE. DC options discussed. Pt and his wife prefer HHPT over discharging to a SNF. They have a daughter that can assist them intermittently as well. Functional Outcomes  AM-PAC Inpatient Mobility Raw Score : 18              Cognition  WFL  Orientation:    alert and oriented x 4  Command Following:   [facility]    Education  Barriers To Learning: none  Patient Education: patient educated on PT role and benefits, plan of care, transfer training, discharge recommendations  Learning Assessment:  patient verbalizes and demonstrates understanding    Assessment  Activity Tolerance: Fair, SOB at times which pt states is his normal, O2 sat was normal on room air, BP 99/62 after mobility. Impairments Requiring Therapeutic Intervention: decreased functional mobility, decreased ROM, decreased strength, decreased endurance, decreased balance, increased pain  Prognosis: good  Clinical Assessment: The pt presents with decreased LLE strength and ROM due to recent knee infections and surgery. This impacts his balance, gait pattern, activity tolerance and safety at home. He prefers to DC home with his wife at this time and work with [email or web address]. Safety Interventions: patient left in chair, call light within reach, nurse notified, and family/caregiver present    Plan  Frequency: 7 x/week  Current Treatment Recommendations: strengthening, ROM, balance training, functional mobility training, transfer training, gait training, endurance training, neuromuscular re-education, and safety education    Goals  Patient Goals: to DC home   Short Term Goals:  Time Frame:  To be met prior to Dc  Patient will complete bed mobility at modified independent   Patient will complete transfers at modified independent   Patient will ambulate 100 ft with use of rolling walker at supervision  Patient will complete car transfer at minimal assistance    Therapy Session Time      Individual Group Co-treatment   Time In     5477 Time Out     1345   Minutes     53     Timed Code Treatment Minutes:  38 Minutes  Total Treatment Minutes:  53       Electronically Signed By: Joselin Lockhart, PT DPT [id number]

## 2023-03-11 LAB
ANION GAP SERPL CALCULATED.3IONS-SCNC: 4 MMOL/L (ref 3–16)
BASOPHILS ABSOLUTE: 0 K/UL (ref 0–0.2)
BASOPHILS RELATIVE PERCENT: 0.6 %
BUN BLDV-MCNC: 36 MG/DL (ref 7–20)
CALCIUM SERPL-MCNC: 9 MG/DL (ref 8.3–10.6)
CHLORIDE BLD-SCNC: 101 MMOL/L (ref 99–110)
CO2: 30 MMOL/L (ref 21–32)
CREAT SERPL-MCNC: 1.7 MG/DL (ref 0.8–1.3)
EOSINOPHILS ABSOLUTE: 0.1 K/UL (ref 0–0.6)
EOSINOPHILS RELATIVE PERCENT: 1 %
FUNGUS STAIN: NORMAL
GFR SERPL CREATININE-BSD FRML MDRD: 39 ML/MIN/{1.73_M2}
GLUCOSE BLD-MCNC: 152 MG/DL (ref 70–99)
GLUCOSE BLD-MCNC: 152 MG/DL (ref 70–99)
GLUCOSE BLD-MCNC: 184 MG/DL (ref 70–99)
GLUCOSE BLD-MCNC: 216 MG/DL (ref 70–99)
GLUCOSE BLD-MCNC: 234 MG/DL (ref 70–99)
HCT VFR BLD CALC: 25.7 % (ref 40.5–52.5)
HEMOGLOBIN: 8.5 G/DL (ref 13.5–17.5)
LYMPHOCYTES ABSOLUTE: 2.3 K/UL (ref 1–5.1)
LYMPHOCYTES RELATIVE PERCENT: 35 %
MCH RBC QN AUTO: 34.6 PG (ref 26–34)
MCHC RBC AUTO-ENTMCNC: 33 G/DL (ref 31–36)
MCV RBC AUTO: 104.9 FL (ref 80–100)
MONOCYTES ABSOLUTE: 0.3 K/UL (ref 0–1.3)
MONOCYTES RELATIVE PERCENT: 4.6 %
NEUTROPHILS ABSOLUTE: 3.8 K/UL (ref 1.7–7.7)
NEUTROPHILS RELATIVE PERCENT: 58.8 %
PDW BLD-RTO: 20.7 % (ref 12.4–15.4)
PERFORMED ON: ABNORMAL
PLATELET # BLD: 180 K/UL (ref 135–450)
PMV BLD AUTO: 8.2 FL (ref 5–10.5)
POTASSIUM SERPL-SCNC: 4.9 MMOL/L (ref 3.5–5.1)
RBC # BLD: 2.45 M/UL (ref 4.2–5.9)
SODIUM BLD-SCNC: 135 MMOL/L (ref 136–145)
TOTAL CK: 743 U/L (ref 39–308)
WBC # BLD: 6.5 K/UL (ref 4–11)

## 2023-03-11 PROCEDURE — 6360000002 HC RX W HCPCS: Performed by: INTERNAL MEDICINE

## 2023-03-11 PROCEDURE — 97530 THERAPEUTIC ACTIVITIES: CPT

## 2023-03-11 PROCEDURE — 82550 ASSAY OF CK (CPK): CPT

## 2023-03-11 PROCEDURE — 80048 BASIC METABOLIC PNL TOTAL CA: CPT

## 2023-03-11 PROCEDURE — 85025 COMPLETE CBC W/AUTO DIFF WBC: CPT

## 2023-03-11 PROCEDURE — 97535 SELF CARE MNGMENT TRAINING: CPT

## 2023-03-11 PROCEDURE — 93005 ELECTROCARDIOGRAM TRACING: CPT | Performed by: INTERNAL MEDICINE

## 2023-03-11 PROCEDURE — 6370000000 HC RX 637 (ALT 250 FOR IP): Performed by: ORTHOPAEDIC SURGERY

## 2023-03-11 PROCEDURE — 2580000003 HC RX 258: Performed by: ORTHOPAEDIC SURGERY

## 2023-03-11 PROCEDURE — 6370000000 HC RX 637 (ALT 250 FOR IP): Performed by: INTERNAL MEDICINE

## 2023-03-11 PROCEDURE — 1200000000 HC SEMI PRIVATE

## 2023-03-11 PROCEDURE — 6370000000 HC RX 637 (ALT 250 FOR IP): Performed by: PHYSICIAN ASSISTANT

## 2023-03-11 PROCEDURE — 36415 COLL VENOUS BLD VENIPUNCTURE: CPT

## 2023-03-11 PROCEDURE — 97110 THERAPEUTIC EXERCISES: CPT

## 2023-03-11 RX ADMIN — ASPIRIN 81 MG: 81 TABLET, COATED ORAL at 09:57

## 2023-03-11 RX ADMIN — ACETAMINOPHEN 1000 MG: 500 TABLET ORAL at 22:06

## 2023-03-11 RX ADMIN — GABAPENTIN 300 MG: 300 CAPSULE ORAL at 17:08

## 2023-03-11 RX ADMIN — AMLODIPINE BESYLATE 2.5 MG: 5 TABLET ORAL at 20:43

## 2023-03-11 RX ADMIN — OXYCODONE 10 MG: 5 TABLET ORAL at 13:16

## 2023-03-11 RX ADMIN — APIXABAN 5 MG: 5 TABLET, FILM COATED ORAL at 09:57

## 2023-03-11 RX ADMIN — SODIUM CHLORIDE: 9 INJECTION, SOLUTION INTRAVENOUS at 03:39

## 2023-03-11 RX ADMIN — LINEZOLID 600 MG: 600 INJECTION, SOLUTION INTRAVENOUS at 13:22

## 2023-03-11 RX ADMIN — GABAPENTIN 300 MG: 300 CAPSULE ORAL at 20:43

## 2023-03-11 RX ADMIN — ALOGLIPTIN 12.5 MG: 12.5 TABLET, FILM COATED ORAL at 09:57

## 2023-03-11 RX ADMIN — LINEZOLID 600 MG: 600 INJECTION, SOLUTION INTRAVENOUS at 00:11

## 2023-03-11 RX ADMIN — PANTOPRAZOLE SODIUM 40 MG: 40 TABLET, DELAYED RELEASE ORAL at 05:11

## 2023-03-11 RX ADMIN — ACETAMINOPHEN 1000 MG: 500 TABLET ORAL at 13:17

## 2023-03-11 RX ADMIN — ACETAMINOPHEN 1000 MG: 500 TABLET ORAL at 05:11

## 2023-03-11 RX ADMIN — APIXABAN 2.5 MG: 2.5 TABLET, FILM COATED ORAL at 20:43

## 2023-03-11 RX ADMIN — INSULIN LISPRO 2 UNITS: 100 INJECTION, SOLUTION INTRAVENOUS; SUBCUTANEOUS at 17:09

## 2023-03-11 RX ADMIN — Medication 10 ML: at 20:43

## 2023-03-11 RX ADMIN — LEVOTHYROXINE SODIUM 125 MCG: 0.12 TABLET ORAL at 05:11

## 2023-03-11 RX ADMIN — GABAPENTIN 300 MG: 300 CAPSULE ORAL at 09:57

## 2023-03-11 RX ADMIN — GABAPENTIN 300 MG: 300 CAPSULE ORAL at 13:17

## 2023-03-11 ASSESSMENT — PAIN DESCRIPTION - ORIENTATION
ORIENTATION: LEFT

## 2023-03-11 ASSESSMENT — PAIN SCALES - GENERAL
PAINLEVEL_OUTOF10: 7
PAINLEVEL_OUTOF10: 2
PAINLEVEL_OUTOF10: 3
PAINLEVEL_OUTOF10: 3

## 2023-03-11 ASSESSMENT — PAIN DESCRIPTION - LOCATION
LOCATION: LEG
LOCATION: KNEE

## 2023-03-11 ASSESSMENT — PAIN DESCRIPTION - DESCRIPTORS
DESCRIPTORS: ACHING
DESCRIPTORS: ACHING

## 2023-03-11 ASSESSMENT — PAIN DESCRIPTION - PAIN TYPE: TYPE: SURGICAL PAIN

## 2023-03-11 NOTE — PROGRESS NOTES
Pt fatigued after working with therapy and up to chair. Unable to come to standing position with walker and assist. Used maxi move to transfer back to bed. Pt tolerated well.

## 2023-03-11 NOTE — PLAN OF CARE
Problem: Chronic Conditions and Co-morbidities  Goal: Patient's chronic conditions and co-morbidity symptoms are monitored and maintained or improved  3/10/2023 2042 by Torsten Vivar RN  Outcome: Progressing

## 2023-03-11 NOTE — PROGRESS NOTES
[facility] [address]   Phone: [phone]    Physical Therapy    [] Initial Evaluation            [x] Daily Treatment Note         [] Discharge Summary      Patient: Radha Echevarria   : 1940   MRN: [id number]   Date of Service:  3/11/2023  Admitting Diagnosis: Direct infection of left knee in infectious and parasitic diseases classified elsewhere [facility])  Current Admission Summary: The pt was admited with a L knee infection. S/p I&D with polyethylene exchange and antibiotic bead placement on 3/9. Past Medical History:  has a past medical history of Arthritis, Atrial fibrillation ([facility] [address] CAD (coronary artery disease), Diabetes mellitus ([facility] [address] Hyperlipidemia, Hypertension, and Thyroid disease. Past Surgical History:  has a past surgical history that includes Cardiac surgery; Carotid endarterectomy; sinus surgery; joint replacement; back surgery; Foot surgery; Revision total knee arthroplasty (Left, 2022); Knee Arthrotomy (Left, 3/9/2023); and Revision total knee arthroplasty (Left, 3/9/2023). Discharge Recommendations: Radha Echevarria scored a 18/24 on the AM-PAC short mobility form. Current research shows that an AM-PAC score of 18 or greater is typically associated with a discharge to the patient's home setting. Based on the patient's AM-PAC score and their current functional mobility deficits, it is recommended that the patient have 2-3 sessions per week of Physical Therapy at d/c to increase the patient's independence. At this time, this patient demonstrates the endurance and safety to discharge home with home services and a follow up treatment frequency of 2-3x/wk. Please see assessment section for further patient specific details. If patient discharges prior to next session this note will serve as a discharge summary. Please see below for the latest assessment towards goals.    HOME HEALTH CARE: LEVEL 1 STANDARD    - Initial home health evaluation to occur within 24-48 hours, in patient home   - Therapy to evaluate with goal of regaining prior level of functioning   - Therapy to evaluate if patient has [address] needs for personal care   DME Required For Discharge: patient has all required DME for discharge  Precautions/Restrictions: high fall risk  Weight Bearing Restrictions: weight bearing as tolerated  [] Right Upper Extremity  [] Left Upper Extremity [] Right Lower Extremity  [x] Left Lower Extremity     Required Braces/Orthotics: knee immobilizer, KI when OOB, no knee ROM , quad sets, ankle pumps, transfers, gait training in knee immob   [] Right  [x] Left  Positional Restrictions:no positional restrictions    Pre-Admission Information   Lives With: spouse                  Type of Home: house  Home Layout: one level  Home Access: level entry  [facility] 45: walk in shower, . Comment: bathroom not accessible with RW  Toilet Height: elevated height  Bathroom Equipment: grab bars in shower, shower chair, hand held shower head  Home Equipment: rollator - 4 wheeled walker, rolling walker  Transfer Assistance: Mod I with [id number]  Ambulation Assistance: modified independent with use of 4WW d/t pain  ADL Assistance: Independent with ADLs  IADL Assistance: requires assistance with all homemaking tasks, requires assistance with meal prep, requires assistance with laundry, requires assistance with vacuuming, requires assistance with cleaning, pt empties , pt gets his own breakfast and lunch  Active :        [x] Yes                 [] No--does not drive at night or in the rain. Current Employment: retired--. Hobbies: hang out with friends and go to [facility]: No recent falls since x1 year ago. Comment: pt battling cellulitis in LLE        Subjective  General: Patient sitting in recliner chair upon arrival with spouse present. Patient is agreeable to PT. Pain: 3/10.   Location: L knee   Pain Interventions: pain medication in place prior to arrival and repositioned        Functional Mobility  Bed Mobility  Bed mobility not completed on this date. Comments: Patient seated in recliner chair at beginning and end of session  Transfers  Sit to stand transfer: moderate assistance  Stand to sit transfer: minimal assistance  Comments: Patient completed STS from recliner chair with RW placed in front requiring increased time. Patient initially remains in flexed posture prior to standing upright    Ambulation  Surface:level surface  Assistive Device: rolling walker  Assistance: contact guard assistance  Distance: 100'  Gait Mechanics: step to gait pattern, slow jackson, heavy UE WB on RW   Comments:  Patient experienced one LOB forward during turn self corrected by patient with min A  Stair Mobility  Stair mobility not completed on this date. Comments:  Wheelchair Mobility:  No w/c mobility completed on this date.   Comments:  Balance  Static Sitting Balance: fair (+): maintains balance at SBA/supervision without use of UE support  Dynamic Sitting Balance: fair: maintains balance at CGA without use of UE support  Static Standing Balance: fair (-): maintains balance at CGA with use of UE support  Dynamic Standing Balance: fair (-): maintains balance at CGA with use of UE support  Comments:     Other Therapeutic Interventions  Seated Exercises: B ankle pumps, LAQ, marching     Functional Outcomes  AM-PAC Inpatient Mobility Raw Score : 18              Cognition  WFL  Orientation:    alert and oriented x 4  Command Following:   [facility]    Education  Barriers To Learning: none  Patient Education: patient educated on PT role and benefits, plan of care, transfer training, discharge recommendations  Learning Assessment:  patient verbalizes and demonstrates understanding    Assessment  Activity Tolerance: Fair; limited by weakness and decreased endurance   Impairments Requiring Therapeutic Intervention: decreased functional mobility, decreased ROM, decreased strength, decreased endurance, decreased balance, increased pain  Prognosis: good  Clinical Assessment: Patient continues to require mod A from recliner chair this date. Patient experienced one minor LOB forward while turning during ambulation this date requiring min A to correct. Patient will continue to benefit from skilled PT in order to return to PLOF with all functional mobility prior to d/c from hospital.   Safety Interventions: patient left in chair, call light within reach, nurse notified, and family/caregiver present    Plan  Frequency: 7 x/week  Current Treatment Recommendations: strengthening, ROM, balance training, functional mobility training, transfer training, gait training, endurance training, neuromuscular re-education, and safety education    Goals  Patient Goals: to DC home   Short Term Goals:  Time Frame:  To be met prior to Dc  Patient will complete bed mobility at modified independent   Patient will complete transfers at modified independent   Patient will ambulate 100 ft with use of rolling walker at supervision  Patient will complete car transfer at minimal assistance    Therapy Session Time      Individual Group Co-treatment   Time In 1148       Time Out 1206       Minutes 18         Timed Code Treatment Minutes:  18 Minutes  Total Treatment Minutes:  18 Minutes       Electronically Signed By: Lisa Conn, PT   Lisa Conn PT, DPT, [id number]

## 2023-03-11 NOTE — PROGRESS NOTES
Pt resting up in chair with left leg elevated on foot stool. Ace wrap noted to be clean dry and intact.

## 2023-03-11 NOTE — PROGRESS NOTES
Progress Note - Dr. Robyn Rodriguez - Internal Medicine  PCP: Ramin LO [id number] [facility] / Agustin Good [phone]    Hospital Day: 2  Code Status: Full Code  Current Diet: ADULT DIET; Regular; 4 carb choices (60 gm/meal)        CC: follow up on medical issues    Subjective:   Baylee Manley is a 79 y.o. male. Pt seen and examined  Chart reviewed since last visit, labs and imaging below      Doing ok  Some bleeding to surgical site; but better than yest  Case d/w Ortho PAC, Ellie    Per ID -   Will order IV linezolid 600 mg every 12 hours for now for corynebacterium coverage. Due to risk of thrombocytopenia, IV linezolid cannot be continued for long-term  We will consider switching patient's antibiotics to IV vancomycin and IV daptomycin on Monday based on creatinine and CK levels    WBC 6.5 today    Review of Systems: (1 system for EPF, 2-9 for detailed, 10+ for comprehensive)  Constitutional: Negative for chills and fever. HENT: Negative for dental problem, nosebleeds and rhinorrhea. Eyes: Negative for photophobia and visual disturbance. Respiratory: Negative for cough, chest tightness and shortness of breath. Cardiovascular: Negative for chest pain and leg swelling. Gastrointestinal: Negative for diarrhea, nausea and vomiting. Endocrine: Negative for polydipsia and polyphagia. Genitourinary: Negative for frequency, hematuria and urgency. Musculoskeletal: Negative for back pain and myalgias. Positive for kneep ain  Skin: Negative for rash. Allergic/Immunologic: Negative for food allergies. Neurological: Negative for dizziness, seizures, syncope and facial asymmetry. Hematological: Negative for adenopathy. Psychiatric/Behavioral: Negative for dysphoric mood. The patient is not nervous/anxious. I have reviewed the patient's medical and social history in detail and updated the computerized patient record.   To recap: He  has a past medical history of Arthritis, Atrial fibrillation ([facility] CAD (coronary artery disease), Diabetes mellitus ([facility] Hyperlipidemia, Hypertension, and Thyroid disease. Malathi Rene He  has a past surgical history that includes Cardiac surgery; Carotid endarterectomy; sinus surgery; joint replacement; back surgery; Foot surgery; Revision total knee arthroplasty (Left, 7/14/2022); Knee Arthrotomy (Left, 3/9/2023); and Revision total knee arthroplasty (Left, 3/9/2023). Malathi Rene He  reports that he has never smoked. He has never used smokeless tobacco. He reports current alcohol use. He reports that he does not use drugs. .        Active Hospital Problems    Diagnosis Date Noted    Coronary artery disease due to lipid rich plaque [I25.10, I25.83] 03/09/2023     Priority: Medium    Class 2 obesity due to excess calories with body mass index (BMI) of 36.0 to 36.9 in adult [E66.09, Z68.36] 03/09/2023     Priority: Medium    History of atrial fibrillation [Z86.79] 03/09/2023     Priority: Medium    Mixed hyperlipidemia [E78.2] 03/09/2023     Priority: Medium    Recurrent cellulitis of lower extremity [L03.119] 03/09/2023     Priority: Medium    Infection of prosthetic left knee joint ([facility] Lee Adhikari 03/09/2023     Priority: Medium    Direct infection of left knee in infectious and parasitic diseases classified elsewhere ([facility] [M01. X62] 03/09/2023     Priority: Medium    Essential hypertension [I10] 07/14/2022     Priority: Medium    Diabetes mellitus type 2 in obese ([facility] [E11.69, E66.9] 07/14/2022     Priority: Medium       Current Facility-Administered Medications: linezolid (ZYVOX) IVPB 600 mg, 600 mg, IntraVENous, Q12H  midazolam PF (VERSED) injection 2 mg, 2 mg, IntraVENous, Once  amLODIPine (NORVASC) tablet 2.5 mg, 2.5 mg, Oral, Nightly  apixaban (ELIQUIS) tablet 5 mg, 5 mg, Oral, BID  aspirin EC tablet 81 mg, 81 mg, Oral, Daily  [Held by provider] atorvastatin (LIPITOR) tablet 40 mg, 40 mg, Oral, Nightly  gabapentin (NEURONTIN) capsule 300 mg, 300 mg, Oral, 4x Daily  levothyroxine (SYNTHROID) tablet 125 mcg, 125 mcg, Oral, Daily  losartan (COZAAR) tablet 50 mg, 50 mg, Oral, Daily  pantoprazole (PROTONIX) tablet 40 mg, 40 mg, Oral, QAM AC  torsemide (DEMADEX) tablet 10 mg, 10 mg, Oral, Daily  0.9 % sodium chloride infusion, , IntraVENous, Continuous  sodium chloride flush 0.9 % injection 5-40 mL, 5-40 mL, IntraVENous, 2 times per day  sodium chloride flush 0.9 % injection 5-40 mL, 5-40 mL, IntraVENous, PRN  0.9 % sodium chloride infusion, , IntraVENous, PRN  acetaminophen (TYLENOL) tablet 1,000 mg, 1,000 mg, Oral, 3 times per day  oxyCODONE (ROXICODONE) immediate release tablet 5 mg, 5 mg, Oral, Q4H PRN **OR** oxyCODONE (ROXICODONE) immediate release tablet 10 mg, 10 mg, Oral, Q4H PRN  HYDROmorphone HCl PF (DILAUDID) injection 0.25 mg, 0.25 mg, IntraVENous, Q3H PRN **OR** HYDROmorphone HCl PF (DILAUDID) injection 0.5 mg, 0.5 mg, IntraVENous, Q3H PRN  bisacodyl (DULCOLAX) suppository 10 mg, 10 mg, Rectal, Daily PRN  alogliptin (NESINA) tablet 12.5 mg, 12.5 mg, Oral, Daily  hydrALAZINE (APRESOLINE) injection 10 mg, 10 mg, IntraVENous, Q6H PRN  0.9 % sodium chloride bolus, 500 mL, IntraVENous, PRN  potassium chloride (KLOR-CON M) extended release tablet 40 mEq, 40 mEq, Oral, PRN **OR** potassium bicarb-citric acid (EFFER-K) effervescent tablet 40 mEq, 40 mEq, Oral, PRN **OR** potassium chloride 10 mEq/100 mL IVPB (Peripheral Line), 10 mEq, IntraVENous, PRN  insulin lispro (HUMALOG) injection vial 0-8 Units, 0-8 Units, SubCUTAneous, TID WC  insulin lispro (HUMALOG) injection vial 0-4 Units, 0-4 Units, SubCUTAneous, Nightly  dextrose bolus 10% 125 mL, 125 mL, IntraVENous, PRN **OR** dextrose bolus 10% 250 mL, 250 mL, IntraVENous, PRN  glucagon (rDNA) injection 1 mg, 1 mg, SubCUTAneous, PRN  dextrose 10 % infusion, , IntraVENous, Continuous PRN         Objective:  /60   Pulse (!) 45   Temp 97.6 °F (36.4 °C) (Oral)   Resp 16   Ht 6' (1.829 m)   [employer] 270 lb (122.5 kg)   SpO2 95%   BMI 36.62 kg/m²      Patient Vitals for the past 24 hrs:   BP Temp Temp src Pulse Resp SpO2   03/11/23 0800 102/60 97.6 °F (36.4 °C) Oral (!) 45 16 95 %   03/11/23 0515 110/66 (!) 96.7 °F (35.9 °C) Temporal 58 18 96 %   03/10/23 2321 120/66 (!) 96.2 °F (35.7 °C) Temporal 50 18 96 %   03/10/23 1945 109/67 (!) 96.7 °F (35.9 °C) Temporal 58 18 97 %   03/10/23 1730 (!) 92/52 -- -- -- -- --   03/10/23 1710 (!) 84/52 97.5 °F (36.4 °C) Oral 52 18 96 %   03/10/23 1315 118/69 -- -- -- -- --   03/10/23 1213 (!) 91/51 97.7 °F (36.5 °C) Axillary 61 18 97 %       Patient Vitals for the past 96 hrs (Last 3 readings):   Weight   03/09/23 1313 270 lb (122.5 kg)             Intake/Output Summary (Last 24 hours) at 3/11/2023 1038  Last data filed at 3/11/2023 0526  Gross per 24 hour   Intake 360 ml   Output --   Net 360 ml           Physical Exam: (2-7 system for EPF/Detailed, ?8 for Comprehensive)  /60   Pulse (!) 45   Temp 97.6 °F (36.4 °C) (Oral)   Resp 16   Ht 6' (1.829 m)   Wt 270 lb (122.5 kg)   SpO2 95%   BMI 36.62 kg/m²   Constitutional: vitals as above: alert, appears stated age and cooperative    Psychiatric: normal insight and judgment, oriented to person, place, time, and general circumstances    Head: Normocephalic, without obvious abnormality, atraumatic    Eyes:lids and lashes normal, conjunctivae and sclerae normal and pupils equal, round, reactive to light and accomodation    EMNT: external ears normal, nares midline    Neck: no carotid bruit, supple, symmetrical, trachea midline and thyroid not enlarged, symmetric, no tenderness/mass/nodules     Respiratory: clear to auscultation and percussion bilaterally with normal respiratory effort    Cardiovascular: normal rate, regular rhythm, normal S1 and S2 and no murmurs    Gastrointestinal: soft, non-tender, non-distended, normal bowel sounds, no masses or organomegaly    Extremities: no clubbing, no edema  ; L knee dressing bloody  Skin:No rashes or nodules noted. Neurologic:negative         Labs:  Lab Results   Component Value Date    WBC 6.5 03/11/2023    HGB 8.5 (L) 03/11/2023    HCT 25.7 (L) 03/11/2023     03/11/2023    ALT 58 (H) 03/09/2023    AST 92 (H) 03/09/2023     (L) 03/11/2023    K 4.9 03/11/2023     03/11/2023    CREATININE 1.7 (H) 03/11/2023    BUN 36 (H) 03/11/2023    CO2 30 03/11/2023    INR 1.13 03/09/2023    LABA1C 7.2 03/10/2023    LABMICR Not Indicated 06/22/2022     Lab Results   Component Value Date    CKTOTAL 743 (H) 03/11/2023       Recent Imaging Results are Reviewed:  XR KNEE LEFT (1-2 VIEWS)    Result Date: 3/9/2023  EXAMINATION: 4 XRAY VIEWS OF THE LEFT KNEE 3/9/2023 6:02 pm COMPARISON: 07/14/2022. HISTORY: ORDERING SYSTEM PROVIDED HISTORY: post op film TECHNOLOGIST PROVIDED HISTORY: Of operative side while in recovery room. Reason for exam:->post op film Reason for Exam: post op films FINDINGS: There is a metallic prosthesis in the distal femur and proximal tibia which are unchanged and are held in place methylmethacrylate. There are postop changes along the patella which is unchanged. There are numerous rounded densities consistent with bone grafts seen along the diametaphyseal region of the distal femur bilaterally extending inferiorly around the joint and proximal tibia which is more apparent. There are surgical clips in the soft tissues laterally. There is moderate edema and subcutaneous air in the soft tissues anteriorly extending into the suprapatellar bursa. Previous total left knee replacement which is unchanged in position with bone graft material seen scattered along the distal femur and proximal tibia. There are extensive postop changes in the soft tissues anteriorly and extending into the suprapatellar bursa which is more prominent.        Lab Results   Component Value Date/Time    GLUCOSE 152 03/11/2023 05:55 AM     Lab Results   Component Value Date/Time    POCGLU 152 03/11/2023 07:26 AM     /60   Pulse (!) 45   Temp 97.6 °F (36.4 °C) (Oral)   Resp 16   Ht 6' (1.829 m)   Wt 270 lb (122.5 kg)   SpO2 95%   BMI 36.62 kg/m²     Assessment and Plan:  Principal Problem:    Direct infection of left knee in infectious and parasitic diseases classified elsewhere ([facility] -Established problem. Stable. Some bleeding to dressing  Plan: stay on post op pathway. To work with pt/ot today. Transition to oral pain meds. Cont to follow h/h to assess post op anemia. Active Problems:    Essential hypertension -Established problem. Stable. 102/60  Plan: Continue medications. Continue to check BP q shift. CBC and BMP ordered to monitor for disease progression, medication side effect. Diabetes mellitus type 2 in [facility]) -Established problem. Stable. Glu 152  Plan: Continue on CCC diet, home medications. CBC and BMP ordered to monitor sugars and for other medication complications. Fingerstick glucose ordered to check for alterations in levels throughout day. Sliding scale insulin ordered to cover fingerstick levels. Coronary artery disease due to lipid rich plaque    Class 2 obesity due to excess calories with body mass index (BMI) of 36.0 to 36.9 in adult    History of atrial fibrillation    Mixed hyperlipidemia  Plan: cont on home meds    Recurrent cellulitis of lower extremity    Infection of prosthetic left knee joint ([facility]  Plan: on iv abx per ID.  Awaiting cx results - per ID: consider switching patient's antibiotics to IV vancomycin and IV daptomycin on Monday      Case discussed with: ortho  Tests ordered/reviewed: cbc, bmp, cultures          (Please note that portions of this note were completed with a voice recognition program.  Efforts were made to edit the dictations but occasionally words are mis-transcribed.)        Rickey Sanchez MD  3/11/2023

## 2023-03-11 NOTE — PROGRESS NOTES
Pt resting awake in bd. Initially refused Elequis and aspirin. DANILO Antonio in to round and discussed with pt that he is ok to take. Pt noted to have bloody drainage to proximal ace wrap. Dressing removed and replaced by DANILO Antonio at bedside, additional kerlex and 4x4 applied. New skin tear noted to right wrist. Mepilex border applied. PO meds taken well with water. Pt is axox4 and able to answer questions and follow commands throughout assessment.

## 2023-03-11 NOTE — FLOWSHEET NOTE
03/10/23 2041   Assessment   Charting Type Shift assessment   Psychosocial   Psychosocial (WDL) WDL   Neurological   Neuro (WDL) WDL   Level of Consciousness 0   Orientation Level Oriented X4   Cognition Appropriate judgement; Appropriate safety awareness; Appropriate attention/concentration; Appropriate for developmental age   Speech Clear   R Foot Dorsiflexion Moderate   L Foot Dorsiflexion Moderate   R Foot Plantar Flexion Moderate   L Foot Plantar Flexion Moderate   RLE Motor Response Normal extension;Normal flexion; Responds to command   RLE Sensation  Full sensation   LLE Motor Response Normal extension;Normal flexion; Responds to command   LLE Sensation  Full sensation   [place] Coma Scale   Eye Opening 4   Best Verbal Response 5   Best Motor Response 6   [place]ira Coma Scale Score 15   HEENT (Head, Ears, Eyes, Nose, & Throat)   HEENT (WDL) X   Right Eye Glasses   Left Eye Glasses   Respiratory   Respiratory (WDL) X   Respiratory Pattern Regular   Respiratory Depth Normal   Respiratory Quality/Effort Unlabored   Chest Assessment Chest expansion symmetrical   L Breath Sounds Diminished   R Breath Sounds Rhonchi   Breath Sounds   Right Upper Lobe Rhonchi   Right Middle Lobe Rhonchi   Right Lower Lobe Rhonchi   Left Upper Lobe Diminished   Left Lower Lobe Diminished   Cardiac   Cardiac (WDL) WDL   Gastrointestinal   Abdominal (WDL) WDL   Genitourinary   Genitourinary (WDL) WDL   Peripheral Vascular   Peripheral Vascular (WDL) X   Edema Right upper extremity; Left upper extremity; Left lower extremity   RUE Edema Non-pitting   LUE Edema Non-pitting   RLE Edema +2   LLE Edema +2   RLE Neurovascular Assessment   Capillary Refill Less than/Equal to 3 seconds   Color Appropriate for Ethnicity   Temperature Warm   R Pedal Pulse +1   LLE Neurovascular Assessment   Capillary Refill Less than/Equal to 3 seconds   Color Appropriate for Ethnicity   Temperature Warm   L Pedal Pulse +1   Skin Integumentary    Skin Integumentary (WDL) X   Musculoskeletal   Musculoskeletal (WDL) X   RL Extremity Weakness   LL Extremity Surgery;Weakness   Incision 07/14/22 Knee Anterior; Left   Date First Assessed/Time First Assessed: 07/14/22 1151   Present on Hospital Admission: No  Location: Knee  Incision Location Orientation: Anterior; Left   Dressing Status Clean;Dry; Intact   Dressing/Treatment ABD pad; Ace wrap   Closure Sutures   Incision Assessment Other (Comment)  (PARIS, dressing in place)

## 2023-03-11 NOTE — PROGRESS NOTES
[facility] [address] Department   Phone: [phone]    Occupational Therapy    [] Initial Evaluation            [x] Daily Treatment Note         [] Discharge Summary      Patient: Author Sánchez   : 1940   MRN: [id number]   Date of Service:  3/11/2023    Admitting Diagnosis:  Direct infection of left knee in infectious and parasitic diseases classified elsewhere [facility])  Current Admission Summary: The pt was admited with a L knee infection. S/p I&D with polyethylene exchange and antibiotic bead placement on 3/9. Past Medical History:  has a past medical history of Arthritis, Atrial fibrillation ([facility] CAD (coronary artery disease), Diabetes mellitus ([facility] Hyperlipidemia, Hypertension, and Thyroid disease. Past Surgical History:  has a past surgical history that includes Cardiac surgery; Carotid endarterectomy; sinus surgery; joint replacement; back surgery; Foot surgery; Revision total knee arthroplasty (Left, 2022); Knee Arthrotomy (Left, 3/9/2023); and Revision total knee arthroplasty (Left, 3/9/2023). Discharge Recommendations: Author Sánchez scored a 18/24 on the AM-PAC ADL Inpatient form. Current research shows that an AM-PAC score of 18 or greater is typically associated with a discharge to the patient's home setting. Based on the patient's AM-PAC score, and their current ADL deficits, it is recommended that the patient have 2-3 sessions per week of Occupational Therapy at d/c to increase the patient's independence. At this time, this patient demonstrates the endurance and safety to discharge home with home based OT (home vs OP services) and a follow up treatment frequency of 2-3x/wk. Please see assessment section for further patient specific details. If patient discharges prior to next session this note will serve as a discharge summary. Please see below for the latest assessment towards goals.    HOME HEALTH CARE: LEVEL 3 SAFETY     - Initial home health evaluation to occur within 24-48 hours, in patient home   - Therapy evaluations in home within 24-48 hours of discharge; including DME and home safety   - Frontload therapy 5 days, then 3x a week   - Therapy to evaluate if patient has [address] needs for personal care   -  evaluation within 24-48 hours, includes evaluation of resources and insurance to determine [place], [place], LTC, and Medicaid options       DME Required For Discharge: patient has all required DME for discharge    Precautions/Restrictions: high fall risk  Weight Bearing Restrictions: weight bearing as tolerated  [] Right Upper Extremity  [] Left Upper Extremity [] Right Lower Extremity  [] Left Lower Extremity     Required Braces/Orthotics: knee immobilizer   [x] Right  [] Left  Positional Restrictions:no positional restrictions    Pre-Admission Information   Lives With: spouse                  Type of Home: house  Home Layout: one level  Home Access: level entry  [facility]: walk in shower, . Comment: bathroom not accessible with RW  Toilet Height: elevated height  Bathroom Equipment: grab bars in shower, shower chair, hand held shower head  Home Equipment: rollator - 4 wheeled walker, rolling walker  Transfer Assistance: Mod I with [id number]  Ambulation Assistance: modified independent with use of 4WW d/t pain  ADL Assistance: Independent with ADLs  IADL Assistance: requires assistance with all homemaking tasks, requires assistance with meal prep, requires assistance with laundry, requires assistance with vacuuming, requires assistance with cleaning, pt empties , pt gets his own breakfast and lunch  Active :        [x] Yes                 [] No--does not drive at night or in the rain. Current Employment: retired--. Hobbies: hang out with friends and go to [place]: No recent falls since x1 year ago.       Comment: pt battling cellulitis in LLE      Subjective  General: Pt sitting in recliner upon arrival. Flat affect. Spouse present. Pt pleasant and agreeable to OT.    Pain: 3/10.  Location: R knee  Pain Interventions: pain medication in place prior to arrival and repositioned         Activities of Daily Living  Basic Activities of Daily Living  Lower Extremity Dressing: setup assistance moderate assistance Comment: Mod A to thread over R knee immobilizer; Min A to pull up and adjust from knees to hips in stance.   Dressing Comments: Patient found to have urinated on knee immobilizer following toileting. Immobilizer replaced, adjusted accordingly, family education regarding doffing/donning and placement of immobilizer  Toileting: contact guard assistance Increased time to complete task Comment: pt attempting to urinate in stance at commode. Increased difficulty with accuracy despite proper RW placement. Pt accidentally urinated on floor, missing commode..    Toileting Comments: family educated, use of urinal or sitting may be more appropriate at home  Comment: pt declines other ADLs this session.  Instrumental Activities of Daily Living  No IADL completed on this date.    Functional Mobility  Bed Mobility  Bed mobility not completed on this date.  Comments:  Transfers  Sit to stand transfer:minimal assistance, moderate assistance  Stand to sit transfer: contact guard assistance, minimal assistance  Bed / Chair transfer: contact guard assistance.    Bed / Chair equipment: rolling walker  Bed / Chair comments: recliner<>toilet  Toilet transfer: contact guard assistance  Toilet transfer comments: stance at commode, RW over toilet appropriately  Comments:    Functional Mobility:  Sitting Balance: Independent.    Standing Balance: contact guard assistance.    Functional Mobility: .  contact guard assistance  Functional Mobility Activity: to/from bathroom, pt also ambulated to/from hallway with RW CGA. (~12-15 feet +25 feet)   Comment: No SOB or LOB noted. Decreased jackson.   Other Therapeutic  Interventions    Functional Outcomes       Cognition  WFL  Orientation:    alert and oriented x 4  Command Following:   [facility]     Education  Barriers To Learning: none  Patient Education: patient educated on goals, OT role and benefits, plan of care, ADL adaptive strategies, IADL safety, adaptive device training, transfer training, discharge recommendations  Learning Assessment:  patient verbalizes understanding, would benefit from continued reinforcement    Assessment  Activity Tolerance: Pt tolerated treatment well. Impairments Requiring Therapeutic Intervention: decreased functional mobility, decreased ADL status, decreased ROM, decreased endurance, decreased balance, increased pain  Prognosis: excellent  Clinical Assessment: Pt presenting below his functional baseline with the above deficits associated with s/p I&D with polyethylene exchange and antibiotic bead placement on 3/9. Continued OT indicated in order to facilitate safe and successful return to OF. Safety Interventions: patient left in chair, chair alarm in place, call light within reach, nurse notified, and family/caregiver present    Plan  Frequency: 7 x/week  Current Treatment Recommendations: strengthening, balance training, functional mobility training, transfer training, gait training, patient/caregiver education, ADL/self-care training, IADL training, and safety education    Goals  Patient Goals: Return to home.     Short Term Goals:  Time Frame: Discharge  Patient will complete upper body ADL at Independent   Patient will complete lower body ADL at modified independent   Patient will complete toileting at modified independent   Patient will complete grooming at Independent   Patient will complete functional transfers at modified independent     Goals ongoing 3/11    Therapy Session Time     Individual Group Co-treatment   Time In 1300     Time Out 1342     Minutes 42          Timed Code Treatment Minutes: 42 Minutes  Total Treatment Minutes: 42 minutes       Electronically Signed By: Noreen Murdock OTR/L [id number]

## 2023-03-11 NOTE — PROGRESS NOTES
Department of Orthopedic Surgery     Progress Note    Subjective:   Admit Day:3/9/2023    Patient is comfortable appearing. Denies chest pain, shortness of air or calf pain. Tolerating PO. Objective[de-identified]    height is 6' (1.829 m) and weight is 270 lb (122.5 kg). His oral temperature is 97.6 °F (36.4 °C). His blood pressure is 102/60 and his pulse is 45 (abnormal). His respiration is 16 and oxygen saturation is 95%. Bloody show in dressing, calf soft, neg mary, nvi      Labs:  Lab Results   Component Value Date/Time    WBC 6.5 03/11/2023 05:55 AM    HGB 8.5 03/11/2023 05:55 AM    HCT 25.7 03/11/2023 05:55 AM     03/11/2023 05:55 AM       Lab Results   Component Value Date/Time    INR 1.13 03/09/2023 01:00 PM       Lab Results   Component Value Date/Time     03/11/2023 05:55 AM    K 4.9 03/11/2023 05:55 AM    CO2 30 03/11/2023 05:55 AM    BUN 36 03/11/2023 05:55 AM    CREATININE 1.7 03/11/2023 05:55 AM    CALCIUM 9.0 03/11/2023 05:55 AM       Assessment:   Principal Problem:    Direct infection of left knee in infectious and parasitic diseases classified elsewhere [facility])  Active Problems:    Essential hypertension    Diabetes mellitus type 2 in obese ([facility] [address]    Coronary artery disease due to lipid rich plaque    Class 2 obesity due to excess calories with body mass index (BMI) of 36.0 to 36.9 in adult    History of atrial fibrillation    Mixed hyperlipidemia    Recurrent cellulitis of lower extremity    Infection of prosthetic left knee joint ([facility] [address]  Resolved Problems:    * No resolved hospital problems.  *      s/p I&D, ext synovectomy, poly exchange      Plan:   Cont w pathway  A new compression dressing was applied  Pt instructed to keep on until 2 week post op appt  NO KNEE ROM EXERCISES  Knee immobilizer on at all times  Ok to restart eliquis, asa 80    Pt's wife said he is taking 2.5 mg eliquis bid, not 5 mg    IV abx per ID     Dc home with [facility] when ok with Dr Ysabel Sprague w ortho 2 weeks

## 2023-03-12 LAB
ANION GAP SERPL CALCULATED.3IONS-SCNC: 4 MMOL/L (ref 3–16)
BASOPHILS ABSOLUTE: 0 K/UL (ref 0–0.2)
BASOPHILS RELATIVE PERCENT: 0.3 %
BUN BLDV-MCNC: 31 MG/DL (ref 7–20)
CALCIUM SERPL-MCNC: 9.4 MG/DL (ref 8.3–10.6)
CHLORIDE BLD-SCNC: 103 MMOL/L (ref 99–110)
CO2: 29 MMOL/L (ref 21–32)
CREAT SERPL-MCNC: 1.5 MG/DL (ref 0.8–1.3)
EKG ATRIAL RATE: 61 BPM
EKG DIAGNOSIS: NORMAL
EKG P AXIS: -12 DEGREES
EKG P-R INTERVAL: 80 MS
EKG Q-T INTERVAL: 450 MS
EKG QRS DURATION: 130 MS
EKG QTC CALCULATION (BAZETT): 453 MS
EKG R AXIS: -27 DEGREES
EKG T AXIS: -34 DEGREES
EKG VENTRICULAR RATE: 61 BPM
EOSINOPHILS ABSOLUTE: 0.1 K/UL (ref 0–0.6)
EOSINOPHILS RELATIVE PERCENT: 1 %
GFR SERPL CREATININE-BSD FRML MDRD: 46 ML/MIN/{1.73_M2}
GLUCOSE BLD-MCNC: 216 MG/DL (ref 70–99)
GLUCOSE BLD-MCNC: 218 MG/DL (ref 70–99)
GLUCOSE BLD-MCNC: 228 MG/DL (ref 70–99)
GLUCOSE BLD-MCNC: 256 MG/DL (ref 70–99)
GLUCOSE BLD-MCNC: 265 MG/DL (ref 70–99)
HCT VFR BLD CALC: 28.2 % (ref 40.5–52.5)
HEMOGLOBIN: 9 G/DL (ref 13.5–17.5)
LYMPHOCYTES ABSOLUTE: 1.9 K/UL (ref 1–5.1)
LYMPHOCYTES RELATIVE PERCENT: 32 %
MCH RBC QN AUTO: 33.5 PG (ref 26–34)
MCHC RBC AUTO-ENTMCNC: 31.9 G/DL (ref 31–36)
MCV RBC AUTO: 105 FL (ref 80–100)
MONOCYTES ABSOLUTE: 0.3 K/UL (ref 0–1.3)
MONOCYTES RELATIVE PERCENT: 4.5 %
NEUTROPHILS ABSOLUTE: 3.6 K/UL (ref 1.7–7.7)
NEUTROPHILS RELATIVE PERCENT: 62.2 %
PDW BLD-RTO: 20.6 % (ref 12.4–15.4)
PERFORMED ON: ABNORMAL
PLATELET # BLD: 184 K/UL (ref 135–450)
PMV BLD AUTO: 8.3 FL (ref 5–10.5)
POTASSIUM SERPL-SCNC: 4.6 MMOL/L (ref 3.5–5.1)
RBC # BLD: 2.68 M/UL (ref 4.2–5.9)
SODIUM BLD-SCNC: 136 MMOL/L (ref 136–145)
WBC # BLD: 5.8 K/UL (ref 4–11)

## 2023-03-12 PROCEDURE — 6370000000 HC RX 637 (ALT 250 FOR IP): Performed by: PHYSICIAN ASSISTANT

## 2023-03-12 PROCEDURE — 97116 GAIT TRAINING THERAPY: CPT

## 2023-03-12 PROCEDURE — 97530 THERAPEUTIC ACTIVITIES: CPT

## 2023-03-12 PROCEDURE — 85025 COMPLETE CBC W/AUTO DIFF WBC: CPT

## 2023-03-12 PROCEDURE — 80048 BASIC METABOLIC PNL TOTAL CA: CPT

## 2023-03-12 PROCEDURE — 93010 ELECTROCARDIOGRAM REPORT: CPT | Performed by: INTERNAL MEDICINE

## 2023-03-12 PROCEDURE — 6370000000 HC RX 637 (ALT 250 FOR IP): Performed by: ORTHOPAEDIC SURGERY

## 2023-03-12 PROCEDURE — 2580000003 HC RX 258: Performed by: ORTHOPAEDIC SURGERY

## 2023-03-12 PROCEDURE — 6360000002 HC RX W HCPCS: Performed by: INTERNAL MEDICINE

## 2023-03-12 PROCEDURE — 97535 SELF CARE MNGMENT TRAINING: CPT

## 2023-03-12 PROCEDURE — 1200000000 HC SEMI PRIVATE

## 2023-03-12 PROCEDURE — 36415 COLL VENOUS BLD VENIPUNCTURE: CPT

## 2023-03-12 PROCEDURE — 6370000000 HC RX 637 (ALT 250 FOR IP): Performed by: INTERNAL MEDICINE

## 2023-03-12 RX ORDER — SODIUM CHLORIDE 0.9 % (FLUSH) 0.9 %
5-40 SYRINGE (ML) INJECTION EVERY 12 HOURS SCHEDULED
Status: DISCONTINUED | OUTPATIENT
Start: 2023-03-12 | End: 2023-03-13 | Stop reason: HOSPADM

## 2023-03-12 RX ORDER — LIDOCAINE HYDROCHLORIDE 10 MG/ML
5 INJECTION, SOLUTION EPIDURAL; INFILTRATION; INTRACAUDAL; PERINEURAL ONCE
Status: DISCONTINUED | OUTPATIENT
Start: 2023-03-12 | End: 2023-03-13 | Stop reason: HOSPADM

## 2023-03-12 RX ORDER — POLYETHYLENE GLYCOL 3350 17 G/17G
17 POWDER, FOR SOLUTION ORAL DAILY
Status: DISCONTINUED | OUTPATIENT
Start: 2023-03-12 | End: 2023-03-13 | Stop reason: HOSPADM

## 2023-03-12 RX ORDER — SODIUM CHLORIDE 0.9 % (FLUSH) 0.9 %
5-40 SYRINGE (ML) INJECTION PRN
Status: DISCONTINUED | OUTPATIENT
Start: 2023-03-12 | End: 2023-03-13 | Stop reason: HOSPADM

## 2023-03-12 RX ORDER — SODIUM CHLORIDE 9 MG/ML
25 INJECTION, SOLUTION INTRAVENOUS PRN
Status: DISCONTINUED | OUTPATIENT
Start: 2023-03-12 | End: 2023-03-13 | Stop reason: HOSPADM

## 2023-03-12 RX ADMIN — GABAPENTIN 300 MG: 300 CAPSULE ORAL at 10:43

## 2023-03-12 RX ADMIN — Medication 10 ML: at 10:47

## 2023-03-12 RX ADMIN — INSULIN LISPRO 2 UNITS: 100 INJECTION, SOLUTION INTRAVENOUS; SUBCUTANEOUS at 12:12

## 2023-03-12 RX ADMIN — ASPIRIN 81 MG: 81 TABLET, COATED ORAL at 10:45

## 2023-03-12 RX ADMIN — LOSARTAN POTASSIUM 50 MG: 25 TABLET, FILM COATED ORAL at 10:43

## 2023-03-12 RX ADMIN — LINEZOLID 600 MG: 600 INJECTION, SOLUTION INTRAVENOUS at 13:50

## 2023-03-12 RX ADMIN — INSULIN LISPRO 4 UNITS: 100 INJECTION, SOLUTION INTRAVENOUS; SUBCUTANEOUS at 16:39

## 2023-03-12 RX ADMIN — ALOGLIPTIN 12.5 MG: 12.5 TABLET, FILM COATED ORAL at 10:44

## 2023-03-12 RX ADMIN — AMLODIPINE BESYLATE 2.5 MG: 5 TABLET ORAL at 20:44

## 2023-03-12 RX ADMIN — LEVOTHYROXINE SODIUM 125 MCG: 0.12 TABLET ORAL at 05:31

## 2023-03-12 RX ADMIN — GABAPENTIN 300 MG: 300 CAPSULE ORAL at 20:44

## 2023-03-12 RX ADMIN — APIXABAN 2.5 MG: 2.5 TABLET, FILM COATED ORAL at 10:50

## 2023-03-12 RX ADMIN — POLYETHYLENE GLYCOL 3350 17 G: 17 POWDER, FOR SOLUTION ORAL at 16:58

## 2023-03-12 RX ADMIN — TORSEMIDE 10 MG: 20 TABLET ORAL at 10:45

## 2023-03-12 RX ADMIN — LINEZOLID 600 MG: 600 INJECTION, SOLUTION INTRAVENOUS at 23:38

## 2023-03-12 RX ADMIN — PANTOPRAZOLE SODIUM 40 MG: 40 TABLET, DELAYED RELEASE ORAL at 05:31

## 2023-03-12 RX ADMIN — ACETAMINOPHEN 1000 MG: 500 TABLET ORAL at 20:44

## 2023-03-12 RX ADMIN — LINEZOLID 600 MG: 600 INJECTION, SOLUTION INTRAVENOUS at 00:19

## 2023-03-12 RX ADMIN — GABAPENTIN 300 MG: 300 CAPSULE ORAL at 12:15

## 2023-03-12 RX ADMIN — ACETAMINOPHEN 1000 MG: 500 TABLET ORAL at 05:00

## 2023-03-12 RX ADMIN — SODIUM CHLORIDE: 9 INJECTION, SOLUTION INTRAVENOUS at 00:18

## 2023-03-12 RX ADMIN — APIXABAN 2.5 MG: 2.5 TABLET, FILM COATED ORAL at 20:44

## 2023-03-12 RX ADMIN — ACETAMINOPHEN 1000 MG: 500 TABLET ORAL at 12:12

## 2023-03-12 RX ADMIN — GABAPENTIN 300 MG: 300 CAPSULE ORAL at 16:39

## 2023-03-12 ASSESSMENT — PAIN SCALES - GENERAL
PAINLEVEL_OUTOF10: 2
PAINLEVEL_OUTOF10: 4
PAINLEVEL_OUTOF10: 5
PAINLEVEL_OUTOF10: 2

## 2023-03-12 ASSESSMENT — PAIN DESCRIPTION - ORIENTATION
ORIENTATION: LEFT
ORIENTATION: LEFT
ORIENTATION: RIGHT;LEFT

## 2023-03-12 ASSESSMENT — PAIN DESCRIPTION - DESCRIPTORS
DESCRIPTORS: ACHING
DESCRIPTORS: DISCOMFORT
DESCRIPTORS: DISCOMFORT
DESCRIPTORS: ACHING

## 2023-03-12 ASSESSMENT — PAIN DESCRIPTION - LOCATION
LOCATION: LEG
LOCATION: KNEE
LOCATION: KNEE
LOCATION: LEG

## 2023-03-12 ASSESSMENT — PAIN - FUNCTIONAL ASSESSMENT: PAIN_FUNCTIONAL_ASSESSMENT: ACTIVITIES ARE NOT PREVENTED

## 2023-03-12 ASSESSMENT — PAIN DESCRIPTION - PAIN TYPE
TYPE: SURGICAL PAIN
TYPE: SURGICAL PAIN

## 2023-03-12 NOTE — PROGRESS NOTES
Progress Note - Dr. Fermín Lindsey - Internal Medicine  PCP: Lizzie LO [id number]  / Klaus Pickett [phone]    Hospital Day: 3  Code Status: Full Code  Current Diet: ADULT DIET; Regular; 4 carb choices (60 gm/meal)        CC: follow up on medical issues    Subjective:   Author Gonzalo is a 79 y.o. male. Pt seen and examined  Chart reviewed since last visit, labs and imaging below      Doing ok  Some bleeding to surgical site; but better than yest    Per ID (3/10) -   Will order IV linezolid 600 mg every 12 hours for now for corynebacterium coverage. Due to risk of thrombocytopenia, IV linezolid cannot be continued for long-term  We will consider switching patient's antibiotics to IV vancomycin and IV daptomycin on Monday based on creatinine and CK levels    WBC 5.8 today    Review of Systems: (1 system for EPF, 2-9 for detailed, 10+ for comprehensive)  Constitutional: Negative for chills and fever. HENT: Negative for dental problem, nosebleeds and rhinorrhea. Eyes: Negative for photophobia and visual disturbance. Respiratory: Negative for cough, chest tightness and shortness of breath. Cardiovascular: Negative for chest pain and leg swelling. Gastrointestinal: Negative for diarrhea, nausea and vomiting. Endocrine: Negative for polydipsia and polyphagia. Genitourinary: Negative for frequency, hematuria and urgency. Musculoskeletal: Negative for back pain and myalgias. Positive for kneep ain  Skin: Negative for rash. Allergic/Immunologic: Negative for food allergies. Neurological: Negative for dizziness, seizures, syncope and facial asymmetry. Hematological: Negative for adenopathy. Psychiatric/Behavioral: Negative for dysphoric mood. The patient is not nervous/anxious. I have reviewed the patient's medical and social history in detail and updated the computerized patient record.   To recap: He  has a past medical history of Arthritis, Atrial fibrillation ([facility] CAD (coronary artery disease), Diabetes mellitus ([facility] Hyperlipidemia, Hypertension, and Thyroid disease. Tate Kiser He  has a past surgical history that includes Cardiac surgery; Carotid endarterectomy; sinus surgery; joint replacement; back surgery; Foot surgery; Revision total knee arthroplasty (Left, 7/14/2022); Knee Arthrotomy (Left, 3/9/2023); and Revision total knee arthroplasty (Left, 3/9/2023). Tate Kiser He  reports that he has never smoked. He has never used smokeless tobacco. He reports current alcohol use. He reports that he does not use drugs. .        Active Hospital Problems    Diagnosis Date Noted    Coronary artery disease due to lipid rich plaque [I25.10, I25.83] 03/09/2023     Priority: Medium    Class 2 obesity due to excess calories with body mass index (BMI) of 36.0 to 36.9 in adult [E66.09, Z68.36] 03/09/2023     Priority: Medium    History of atrial fibrillation [Z86.79] 03/09/2023     Priority: Medium    Mixed hyperlipidemia [E78.2] 03/09/2023     Priority: Medium    Recurrent cellulitis of lower extremity [L03.119] 03/09/2023     Priority: Medium    Infection of prosthetic left knee joint ([facility] Timmothy Mew 03/09/2023     Priority: Medium    Direct infection of left knee in infectious and parasitic diseases classified elsewhere ([facility] [M01. X62] 03/09/2023     Priority: Medium    Essential hypertension [I10] 07/14/2022     Priority: Medium    Diabetes mellitus type 2 in obese ([facility] [E11.69, E66.9] 07/14/2022     Priority: Medium       Current Facility-Administered Medications: apixaban (ELIQUIS) tablet 2.5 mg, 2.5 mg, Oral, BID  linezolid (ZYVOX) IVPB 600 mg, 600 mg, IntraVENous, Q12H  midazolam PF (VERSED) injection 2 mg, 2 mg, IntraVENous, Once  amLODIPine (NORVASC) tablet 2.5 mg, 2.5 mg, Oral, Nightly  aspirin EC tablet 81 mg, 81 mg, Oral, Daily  [Held by provider] atorvastatin (LIPITOR) tablet 40 mg, 40 mg, Oral, Nightly  gabapentin (NEURONTIN) capsule 300 mg, 300 mg, Oral, 4x Daily  levothyroxine (SYNTHROID) tablet 125 mcg, 125 mcg, Oral, Daily  losartan (COZAAR) tablet 50 mg, 50 mg, Oral, Daily  pantoprazole (PROTONIX) tablet 40 mg, 40 mg, Oral, QAM AC  torsemide (DEMADEX) tablet 10 mg, 10 mg, Oral, Daily  sodium chloride flush 0.9 % injection 5-40 mL, 5-40 mL, IntraVENous, 2 times per day  sodium chloride flush 0.9 % injection 5-40 mL, 5-40 mL, IntraVENous, PRN  0.9 % sodium chloride infusion, , IntraVENous, PRN  acetaminophen (TYLENOL) tablet 1,000 mg, 1,000 mg, Oral, 3 times per day  oxyCODONE (ROXICODONE) immediate release tablet 5 mg, 5 mg, Oral, Q4H PRN **OR** oxyCODONE (ROXICODONE) immediate release tablet 10 mg, 10 mg, Oral, Q4H PRN  HYDROmorphone HCl PF (DILAUDID) injection 0.25 mg, 0.25 mg, IntraVENous, Q3H PRN **OR** HYDROmorphone HCl PF (DILAUDID) injection 0.5 mg, 0.5 mg, IntraVENous, Q3H PRN  bisacodyl (DULCOLAX) suppository 10 mg, 10 mg, Rectal, Daily PRN  alogliptin (NESINA) tablet 12.5 mg, 12.5 mg, Oral, Daily  hydrALAZINE (APRESOLINE) injection 10 mg, 10 mg, IntraVENous, Q6H PRN  0.9 % sodium chloride bolus, 500 mL, IntraVENous, PRN  potassium chloride (KLOR-CON M) extended release tablet 40 mEq, 40 mEq, Oral, PRN **OR** potassium bicarb-citric acid (EFFER-K) effervescent tablet 40 mEq, 40 mEq, Oral, PRN **OR** potassium chloride 10 mEq/100 mL IVPB (Peripheral Line), 10 mEq, IntraVENous, PRN  insulin lispro (HUMALOG) injection vial 0-8 Units, 0-8 Units, SubCUTAneous, TID WC  insulin lispro (HUMALOG) injection vial 0-4 Units, 0-4 Units, SubCUTAneous, Nightly  dextrose bolus 10% 125 mL, 125 mL, IntraVENous, PRN **OR** dextrose bolus 10% 250 mL, 250 mL, IntraVENous, PRN  glucagon (rDNA) injection 1 mg, 1 mg, SubCUTAneous, PRN  dextrose 10 % infusion, , IntraVENous, Continuous PRN         Objective:  /75   Pulse 64   Temp 97.7 °F (36.5 °C) (Oral)   Resp 16   Ht 6' (1.829 m)   Wt 270 lb (122.5 kg)   SpO2 94%   BMI 36.62 kg/m²      Patient Vitals for the past 24 hrs:   BP Temp Temp src Pulse Resp SpO2   03/12/23 0412 128/75 97.7 °F (36.5 °C) Oral 64 16 94 %   03/11/23 2305 125/68 98 °F (36.7 °C) Oral 65 16 94 %   03/11/23 2036 128/74 97.8 °F (36.6 °C) Oral 64 16 91 %   03/11/23 1635 (!) 124/59 97.3 °F (36.3 °C) Oral 64 12 91 %   03/11/23 1316 -- -- -- -- 16 --   03/11/23 1000 -- -- -- 64 -- --       Patient Vitals for the past 96 hrs (Last 3 readings):   Weight   03/09/23 1313 270 lb (122.5 kg)             Intake/Output Summary (Last 24 hours) at 3/12/2023 2295  Last data filed at 3/12/2023 0119  Gross per 24 hour   Intake 3314.1 ml   Output --   Net 3314.1 ml           Physical Exam: (2-7 system for EPF/Detailed, ?8 for Comprehensive)  /75   Pulse 64   Temp 97.7 °F (36.5 °C) (Oral)   Resp 16   Ht 6' (1.829 m)   Wt 270 lb (122.5 kg)   SpO2 94%   BMI 36.62 kg/m²   Constitutional: vitals as above: alert, appears stated age and cooperative    Psychiatric: normal insight and judgment, oriented to person, place, time, and general circumstances    Head: Normocephalic, without obvious abnormality, atraumatic    Eyes:lids and lashes normal, conjunctivae and sclerae normal and pupils equal, round, reactive to light and accomodation    EMNT: external ears normal, nares midline    Neck: no carotid bruit, supple, symmetrical, trachea midline and thyroid not enlarged, symmetric, no tenderness/mass/nodules     Respiratory: clear to auscultation and percussion bilaterally with normal respiratory effort    Cardiovascular: normal rate, regular rhythm, normal S1 and S2 and no murmurs    Gastrointestinal: soft, non-tender, non-distended, normal bowel sounds, no masses or organomegaly    Extremities: no clubbing, no edema  ; L knee dressing bloody  Skin:No rashes or nodules noted.     Neurologic:negative         Labs:  Lab Results   Component Value Date    WBC 5.8 03/12/2023    HGB 9.0 (L) 03/12/2023    HCT 28.2 (L) 03/12/2023     03/12/2023    ALT 58 (H) 03/09/2023    AST 92 (H) 03/09/2023     03/12/2023    K 4.6 03/12/2023     03/12/2023    CREATININE 1.5 (H) 03/12/2023    BUN 31 (H) 03/12/2023    CO2 29 03/12/2023    INR 1.13 03/09/2023    LABA1C 7.2 03/10/2023    LABMICR Not Indicated 06/22/2022     Lab Results   Component Value Date    HDOASNW 403 (H) 03/11/2023       Recent Imaging Results are Reviewed:  XR KNEE LEFT (1-2 VIEWS)    Result Date: 3/9/2023  EXAMINATION: 4 XRAY VIEWS OF THE LEFT KNEE 3/9/2023 6:02 pm COMPARISON: 07/14/2022. HISTORY: ORDERING SYSTEM PROVIDED HISTORY: post op film TECHNOLOGIST PROVIDED HISTORY: Of operative side while in recovery room. Reason for exam:->post op film Reason for Exam: post op films FINDINGS: There is a metallic prosthesis in the distal femur and proximal tibia which are unchanged and are held in place methylmethacrylate. There are postop changes along the patella which is unchanged. There are numerous rounded densities consistent with bone grafts seen along the diametaphyseal region of the distal femur bilaterally extending inferiorly around the joint and proximal tibia which is more apparent. There are surgical clips in the soft tissues laterally. There is moderate edema and subcutaneous air in the soft tissues anteriorly extending into the suprapatellar bursa. Previous total left knee replacement which is unchanged in position with bone graft material seen scattered along the distal femur and proximal tibia. There are extensive postop changes in the soft tissues anteriorly and extending into the suprapatellar bursa which is more prominent.        Lab Results   Component Value Date/Time    GLUCOSE 228 03/12/2023 04:54 AM     Lab Results   Component Value Date/Time    POCGLU 218 03/12/2023 07:20 AM     /75   Pulse 64   Temp 97.7 °F (36.5 °C) (Oral)   Resp 16   Ht 6' (1.829 m)   Wt 270 lb (122.5 kg)   SpO2 94%   BMI 36.62 kg/m²     Assessment and Plan:  Principal Problem:    Direct infection of left knee in infectious and parasitic diseases classified elsewhere [facility]) -Established problem. Stable. Some bleeding to dressing  Plan: stay on post op pathway. To work with pt/ot today. Transition to oral pain meds. Cont to follow h/h to assess post op anemia. Active Problems:    Essential hypertension -Established problem. Stable. 128/75  Plan: Continue medications. Continue to check BP q shift. CBC and BMP ordered to monitor for disease progression, medication side effect. Diabetes mellitus type 2 in obese [facility]) -Established problem. Stable. Glu 228  Plan: Continue on CCC diet, home medications. CBC and BMP ordered to monitor sugars and for other medication complications. Fingerstick glucose ordered to check for alterations in levels throughout day. Sliding scale insulin ordered to cover fingerstick levels. Coronary artery disease due to lipid rich plaque    Class 2 obesity due to excess calories with body mass index (BMI) of 36.0 to 36.9 in adult    History of atrial fibrillation    Mixed hyperlipidemia  Plan: cont on home meds    Recurrent cellulitis of lower extremity    Infection of prosthetic left knee joint ([address]  Plan: on iv abx per ID.  Awaiting cx results - per ID: consider switching patient's antibiotics to IV vancomycin and IV daptomycin on Monday      Case discussed with: ortho  Tests ordered/reviewed: cbc, bmp, cultures          (Please note that portions of this note were completed with a voice recognition program.  Efforts were made to edit the dictations but occasionally words are mis-transcribed.)        Noé Armenta MD  3/12/2023

## 2023-03-12 NOTE — PROGRESS NOTES
[facility] [address] Department   Phone: [phone]    Physical Therapy    [] Initial Evaluation            [x] Daily Treatment Note         [] Discharge Summary      Patient: Rambo Mendenhall   : 1940   MRN: [id number]   Date of Service:  3/12/2023  Admitting Diagnosis: Direct infection of left knee in infectious and parasitic diseases classified elsewhere [facility])  Current Admission Summary: The pt was admited with a L knee infection. S/p I&D with polyethylene exchange and antibiotic bead placement on 3/9. Past Medical History:  has a past medical history of Arthritis, Atrial fibrillation ([facility] [address] CAD (coronary artery disease), Diabetes mellitus ([facility] [address] Hyperlipidemia, Hypertension, and Thyroid disease. Past Surgical History:  has a past surgical history that includes Cardiac surgery; Carotid endarterectomy; sinus surgery; joint replacement; back surgery; Foot surgery; Revision total knee arthroplasty (Left, 2022); Knee Arthrotomy (Left, 3/9/2023); and Revision total knee arthroplasty (Left, 3/9/2023). Discharge Recommendations: Rambo Mendenhall scored a 17/24 on the AM-PAC short mobility form. Current research shows that an AM-PAC score of 18 or greater is typically associated with a discharge to the patient's home setting. Based on the patient's AM-PAC score and their current functional mobility deficits, it is recommended that the patient have 2-3 sessions per week of Physical Therapy at d/c to increase the patient's independence. At this time, this patient demonstrates the endurance and safety to discharge home with home services and a follow up treatment frequency of 2-3x/wk. Please see assessment section for further patient specific details.     HOME HEALTH CARE: LEVEL 3 SAFETY     - Initial home health evaluation to occur within 24-48 hours, in patient home   - Therapy evaluations in home within 24-48 hours of discharge; including DME and home safety   - Ayad Agustin therapy 5 days, then 3x a week   - Therapy to evaluate if patient has [address] needs for personal care   -  evaluation within 24-48 hours, includes evaluation of resources and insurance to determine [place], [place], LTC, and Medicaid options      DME Required For Discharge: patient has all required DME for discharge  Precautions/Restrictions: high fall risk  Weight Bearing Restrictions: weight bearing as tolerated  [] Right Upper Extremity  [] Left Upper Extremity [] Right Lower Extremity  [x] Left Lower Extremity     Required Braces/Orthotics: knee immobilizer, KI when OOB, no knee ROM , quad sets, ankle pumps, transfers, gait training in knee immob   [] Right  [x] Left  Positional Restrictions:no positional restrictions    Pre-Admission Information   Lives With: spouse                  Type of Home: house  Home Layout: one level  Home Access: level entry  [facility]: walk in shower, . Comment: bathroom not accessible with RW  Toilet Height: elevated height  Bathroom Equipment: grab bars in shower, shower chair, hand held shower head  Home Equipment: rollator - 4 wheeled walker, rolling walker  Transfer Assistance: Mod I with [id number]  Ambulation Assistance: modified independent with use of 4WW d/t pain  ADL Assistance: Independent with ADLs  IADL Assistance: requires assistance with all homemaking tasks, requires assistance with meal prep, requires assistance with laundry, requires assistance with vacuuming, requires assistance with cleaning, pt empties , pt gets his own breakfast and lunch  Active :        [x] Yes                 [] No--does not drive at night or in the rain. Current Employment: retired--. Hobbies: [place] out [place] friends [place] [facility] [place] recent falls since x1 year ago. Comment: pt battling cellulitis in LLE        Subjective  General: Patient sitting in recliner chair upon arrival with spouse present.  Patient is agreeable to PT. Wife present   Pain: 3/10. Location: L knee   Pain Interventions: pain medication in place prior to arrival and repositioned        Functional Mobility  Bed Mobility  Bed mobility not completed on this date. Comments: Patient seated in recliner chair at beginning and end of session  Transfers  Sit to stand transfer: moderate assistance  Stand to sit transfer: minimal assistance  Toilet transfer: minimal assistance  Comments: Pt wife brought in a chair lift device and put in recliner for transfers however pt has difficulty coordinating at this time, if anything it pushes pt towards edge of chair and makes him feel like he is sliding. Therapist requests pt does not use from this chair at this time and completes the transfer with mod A progressing to min A with proper sequencing and technique. Ambulation  Surface:level surface  Assistive Device: rolling walker  Assistance: contact guard assistance  Distance: 15ft+15ft   Gait Mechanics: step to gait pattern, slow jackson, heavy UE WB on RW   Comments:  Pt fatigues quickly with ambulation   Stair Mobility  Stair mobility not completed on this date. Comments:  Wheelchair Mobility:  No w/c mobility completed on this date.   Comments:  Balance  Static Sitting Balance: fair (+): maintains balance at SBA/supervision without use of UE support  Dynamic Sitting Balance: fair: maintains balance at CGA without use of UE support  Static Standing Balance: fair (-): maintains balance at CGA with use of UE support  Dynamic Standing Balance: fair (-): maintains balance at CGA with use of UE support  Comments: Pt requires assistance with breif management at toilet     Other Therapeutic Interventions  Discussed safe exercises to perform in recliner, declines print out     Functional Outcomes  AM-PAC Inpatient Mobility Raw Score : 17              Cognition  WFL  Orientation:    alert and oriented x 4  Command Following:   [facility]    Education  Barriers To Learning: none  Patient Education: patient educated on PT role and benefits, plan of care, transfer training, discharge recommendations  Learning Assessment:  patient verbalizes and demonstrates understanding    Assessment  Activity Tolerance: Fair; limited by weakness and decreased endurance   Impairments Requiring Therapeutic Intervention: decreased functional mobility, decreased ROM, decreased strength, decreased endurance, decreased balance, increased pain  Prognosis: good  Clinical Assessment: Patient continues to require mod A from recliner chair this date. Pt fatigues quickly, unable to ambulate as far as last session. Patient will continue to benefit from skilled PT in order to return to [facility] with all functional mobility prior to d/c from hospital.   Safety Interventions: patient left in chair, chair alarm in place, call light within reach, gait belt, patient at risk for falls, nurse notified, and family/caregiver present    Plan  Frequency: 7 x/week  Current Treatment Recommendations: strengthening, ROM, balance training, functional mobility training, transfer training, gait training, endurance training, neuromuscular re-education, and safety education    Goals  Patient Goals: to DC home   Short Term Goals:  Time Frame: To be met prior to Dc  Patient will complete bed mobility at modified independent   Patient will complete transfers at modified independent   Patient will ambulate 100 ft with use of rolling walker at supervision  Patient will complete car transfer at minimal assistance  NO GOALS MET 3/12     Therapy Session Time      Individual Group Co-treatment   Time In     1312   Time Out     1351   Minutes     39     Timed Code Treatment Minutes:      Total Treatment Minutes: 44       Electronically Signed By: Sigifredo Vick, [address] [place] [address], [address]

## 2023-03-12 NOTE — PROGRESS NOTES
[facility] [address]   Phone: [phone]    Occupational Therapy    [] Initial Evaluation            [x] Daily Treatment Note         [] Discharge Summary      Patient: Amarilys Griffin   : 1940   MRN: [id number]   Date of Service:  3/12/2023    Admitting Diagnosis:  Direct infection of left knee in infectious and parasitic diseases classified elsewhere [facility])  Current Admission Summary: The pt was admited with a L knee infection. S/p I&D with polyethylene exchange and antibiotic bead placement on 3/9. Past Medical History:  has a past medical history of Arthritis, Atrial fibrillation ([facility] [address] CAD (coronary artery disease), Diabetes mellitus ([facility] [address] Hyperlipidemia, Hypertension, and Thyroid disease. Past Surgical History:  has a past surgical history that includes Cardiac surgery; Carotid endarterectomy; sinus surgery; joint replacement; back surgery; Foot surgery; Revision total knee arthroplasty (Left, 2022); Knee Arthrotomy (Left, 3/9/2023); and Revision total knee arthroplasty (Left, 3/9/2023). Discharge Recommendations: Amarilys Griffin scored a 18/24 on the AM-PAC ADL Inpatient form. Current research shows that an AM-PAC score of 18 or greater is typically associated with a discharge to the patient's home setting. Based on the patient's AM-PAC score, and their current ADL deficits, it is recommended that the patient have 2-3 sessions per week of Occupational Therapy at d/c to increase the patient's independence. At this time, this patient demonstrates the endurance and safety to discharge home with home based OT (home vs OP services) and a follow up treatment frequency of 2-3x/wk. Please see assessment section for further patient specific details. If patient discharges prior to next session this note will serve as a discharge summary. Please see below for the latest assessment towards goals.    HOME HEALTH CARE: LEVEL 3 SAFETY     - Initial home health evaluation to occur within 24-48 hours, in patient home   - Therapy evaluations in home within 24-48 hours of discharge; including DME and home safety   - Frontload therapy 5 days, then 3x a week   - Therapy to evaluate if patient has [address] needs for personal care   -  evaluation within 24-48 hours, includes evaluation of resources and insurance to determine [place], [place], LTC, and Medicaid options       DME Required For Discharge: patient has all required DME for discharge    Precautions/Restrictions: high fall risk  Weight Bearing Restrictions: weight bearing as tolerated  [] Right Upper Extremity  [] Left Upper Extremity [] Right Lower Extremity  [x] Left Lower Extremity     Required Braces/Orthotics: knee immobilizer   [] Right  [x] Left  Positional Restrictions: no knee ROM L LE    Pre-Admission Information   Lives With: spouse                  Type of Home: house  Home Layout: one level  Home Access: level entry  [facility]: walk in shower, . Comment: bathroom not accessible with RW  Toilet Height: elevated height  Bathroom Equipment: grab bars in shower, shower chair, hand held shower head  Home Equipment: rollator - 4 wheeled walker, rolling walker  Transfer Assistance: Mod I with [id number]  Ambulation Assistance: modified independent with use of 4WW d/t pain  ADL Assistance: Independent with ADLs  IADL Assistance: requires assistance with all homemaking tasks, requires assistance with meal prep, requires assistance with laundry, requires assistance with vacuuming, requires assistance with cleaning, pt empties , pt gets his own breakfast and lunch  Active :        [x] Yes                 [] No--does not drive at night or in the rain. Current Employment: retired--. Hobbies: hang out with friends and [facility]: No recent falls since x1 year ago.       Comment: pt battling cellulitis in LLE      Subjective  General: Pt supine upon arrival, daughter and granddaughter present. Wife arriving at end of session, pt reporting 2/10 pain in L LE   Pain: 2/10. Location: L LE  Pain Interventions: pain medication in place prior to arrival and repositioned         Activities of Daily Living  Basic Activities of Daily Living  Grooming: setup assistance  Grooming Comments: oral care seated, face washing standing at sink   Upper Extremity Bathing: setup assistance  Bathing Comments: standing at sink   Upper Extremity Dressing: setup assistance  Lower Extremity Dressing: moderate assistance  Dressing Comments: knee immobilizer donned upon arrival, assist to don brief. Male external catheter present and left on d/t urgency and incontinence during admission   Toileting: moderate assistance. Toileting Comments: pt used external catheter to urinate once in chair, tarun care done standing at sink in bathroom, noted redness on buttocks, RN aware. Air cushion used for skin protection   General Comments: pt ambulated to/from bathroom, stood at sink for ADL bathing and dressing, seated for LB dressing  Comment:   Instrumental Activities of Daily Living  No IADL completed on this date. Functional Mobility  Bed Mobility  Supine to Sit: stand by assistance  Comments: HOB elevated, pt has adjustable bed  Transfers  Sit to stand transfer:minimal assistance, moderate assistance  Stand to sit transfer: contact guard assistance, minimal assistance  Toilet transfer: contact guard assistance  Toilet transfer comments: BSC used for sitting at sink for LB dressing, CGA from raised BSC. Family bringing in small lift pad from pt's house. Used in recliner at the hospital. Pt having difficulty with transfer secondary to L knee immobilizer and R LE weakness. Family education and HEP given for strengthening and ADL safety at home   Comments:    Functional Mobility:  Sitting Balance: Independent. Standing Balance: stand by assistance.     Functional Mobility: .  contact guard assistance  Functional Mobility Activity: to/from bathroom  Comment: slower jackson, RW used  Other Therapeutic Interventions    Functional Outcomes  AM-PAC Inpatient Daily Activity Raw Score: 18    Cognition  WFL-slower to process information/education   Orientation:    alert and oriented x 4  Command Following:   [facility]  Barriers To Learning: none  Patient Education: patient educated on goals, OT role and benefits, plan of care, ADL adaptive strategies, IADL safety, adaptive device training, transfer training, discharge recommendations  Learning Assessment:  patient verbalizes understanding, would benefit from continued reinforcement    Assessment  Activity Tolerance: Pt tolerated treatment well  Impairments Requiring Therapeutic Intervention: decreased functional mobility, decreased ADL status, decreased ROM, decreased endurance, decreased balance, increased pain  Prognosis: excellent  Clinical Assessment: pt not at baseline level of functioning, currently limited by LE weakness and use of L knee immobilizer. Pt would benefit from ongoing skilled OT services in order to maximize independence for ADL/IADL tasks and functional mobility   Safety Interventions: patient left in chair, chair alarm in place, call light within reach, nurse notified, and family/caregiver present    Plan  Frequency: 7 x/week  Current Treatment Recommendations: strengthening, balance training, functional mobility training, transfer training, gait training, patient/caregiver education, ADL/self-care training, IADL training, and safety education    Goals  Patient Goals: Return to home.     Short Term Goals:  Time Frame: Discharge  Patient will complete upper body ADL at Independent   Patient will complete lower body ADL at modified independent   Patient will complete toileting at modified independent   Patient will complete grooming at Independent   Patient will complete functional transfers at modified independent     Goals ongoing 3/12    Therapy Session Time     Individual Group Co-treatment   Time In 1022     Time Out 1115     Minutes 53          Timed Code Treatment Minutes: 53 Minutes  Total Treatment Minutes:  53 minutes       Electronically Signed By: MIR Hay/BHAVNA [id number]

## 2023-03-12 NOTE — PLAN OF CARE
Problem: Chronic Conditions and Co-morbidities  Goal: Patient's chronic conditions and co-morbidity symptoms are monitored and maintained or improved  Outcome: Progressing     Problem: Discharge Planning  Goal: Discharge to home or other facility with appropriate resources  Outcome: Progressing     Problem: Safety - Adult  Goal: Free from fall injury  Outcome: Progressing     Problem: ABCDS Injury Assessment  Goal: Absence of physical injury  Outcome: Progressing     Problem: Skin/Tissue Integrity  Goal: Absence of new skin breakdown  Description: 1. Monitor for areas of redness and/or skin breakdown  2. Assess vascular access sites hourly  3. Every 4-6 hours minimum:  Change oxygen saturation probe site  4. Every 4-6 hours:  If on nasal continuous positive airway pressure, respiratory therapy assess nares and determine need for appliance change or resting period. Outcome: Progressing     Problem: Pain  Goal: Verbalizes/displays adequate comfort level or baseline comfort level  Outcome: Progressing  Flowsheets (Taken 3/12/2023 0500)  Verbalizes/displays adequate comfort level or baseline comfort level: Encourage patient to monitor pain and request assistance     Problem: Chronic Conditions and Co-morbidities  Goal: Patient's chronic conditions and co-morbidity symptoms are monitored and maintained or improved  Outcome: Progressing     Problem: Discharge Planning  Goal: Discharge to home or other facility with appropriate resources  Outcome: Progressing     Problem: Safety - Adult  Goal: Free from fall injury  Outcome: Progressing     Problem: ABCDS Injury Assessment  Goal: Absence of physical injury  Outcome: Progressing     Problem: Skin/Tissue Integrity  Goal: Absence of new skin breakdown  Description: 1. Monitor for areas of redness and/or skin breakdown  2. Assess vascular access sites hourly  3. Every 4-6 hours minimum:  Change oxygen saturation probe site  4.   Every 4-6 hours:  If on nasal continuous positive airway pressure, respiratory therapy assess nares and determine need for appliance change or resting period.   Outcome: Progressing     Problem: Pain  Goal: Verbalizes/displays adequate comfort level or baseline comfort level  Outcome: Progressing  Flowsheets (Taken 3/12/2023 0500)  Verbalizes/displays adequate comfort level or baseline comfort level: Encourage patient to monitor pain and request assistance 28.4

## 2023-03-13 ENCOUNTER — CLINICAL DOCUMENTATION (OUTPATIENT)
Dept: INFECTIOUS DISEASES | Age: 83
End: 2023-03-13

## 2023-03-13 VITALS
SYSTOLIC BLOOD PRESSURE: 134 MMHG | DIASTOLIC BLOOD PRESSURE: 75 MMHG | OXYGEN SATURATION: 95 % | HEART RATE: 65 BPM | HEIGHT: 72 IN | WEIGHT: 270 LBS | RESPIRATION RATE: 18 BRPM | BODY MASS INDEX: 36.57 KG/M2 | TEMPERATURE: 97.4 F

## 2023-03-13 PROBLEM — Z79.2 RECEIVING INTRAVENOUS ANTIBIOTIC TREATMENT AS OUTPATIENT: Status: ACTIVE | Noted: 2023-03-13

## 2023-03-13 PROBLEM — Z71.89 COMPLEX CARE COORDINATION: Status: ACTIVE | Noted: 2023-03-09

## 2023-03-13 LAB
ANION GAP SERPL CALCULATED.3IONS-SCNC: 8 MMOL/L (ref 3–16)
BASOPHILS ABSOLUTE: 0 K/UL (ref 0–0.2)
BASOPHILS RELATIVE PERCENT: 0.4 %
BUN BLDV-MCNC: 28 MG/DL (ref 7–20)
CALCIUM SERPL-MCNC: 9.8 MG/DL (ref 8.3–10.6)
CHLORIDE BLD-SCNC: 99 MMOL/L (ref 99–110)
CO2: 29 MMOL/L (ref 21–32)
CREAT SERPL-MCNC: 1.3 MG/DL (ref 0.8–1.3)
EOSINOPHILS ABSOLUTE: 0.1 K/UL (ref 0–0.6)
EOSINOPHILS RELATIVE PERCENT: 1.5 %
GFR SERPL CREATININE-BSD FRML MDRD: 54 ML/MIN/{1.73_M2}
GLUCOSE BLD-MCNC: 218 MG/DL (ref 70–99)
GLUCOSE BLD-MCNC: 236 MG/DL (ref 70–99)
GLUCOSE BLD-MCNC: 266 MG/DL (ref 70–99)
HCT VFR BLD CALC: 28.7 % (ref 40.5–52.5)
HEMOGLOBIN: 9.4 G/DL (ref 13.5–17.5)
LYMPHOCYTES ABSOLUTE: 1.9 K/UL (ref 1–5.1)
LYMPHOCYTES RELATIVE PERCENT: 42.3 %
MCH RBC QN AUTO: 33.6 PG (ref 26–34)
MCHC RBC AUTO-ENTMCNC: 32.6 G/DL (ref 31–36)
MCV RBC AUTO: 103.1 FL (ref 80–100)
MONOCYTES ABSOLUTE: 0.2 K/UL (ref 0–1.3)
MONOCYTES RELATIVE PERCENT: 3.9 %
NEUTROPHILS ABSOLUTE: 2.3 K/UL (ref 1.7–7.7)
NEUTROPHILS RELATIVE PERCENT: 51.9 %
PDW BLD-RTO: 20 % (ref 12.4–15.4)
PERFORMED ON: ABNORMAL
PERFORMED ON: ABNORMAL
PLATELET # BLD: 190 K/UL (ref 135–450)
PMV BLD AUTO: 8 FL (ref 5–10.5)
POTASSIUM SERPL-SCNC: 4.2 MMOL/L (ref 3.5–5.1)
PROCALCITONIN: 0.07 NG/ML (ref 0–0.15)
RBC # BLD: 2.79 M/UL (ref 4.2–5.9)
SODIUM BLD-SCNC: 136 MMOL/L (ref 136–145)
TOTAL CK: 625 U/L (ref 39–308)
WBC # BLD: 4.4 K/UL (ref 4–11)

## 2023-03-13 PROCEDURE — 2580000003 HC RX 258: Performed by: INTERNAL MEDICINE

## 2023-03-13 PROCEDURE — 84145 PROCALCITONIN (PCT): CPT

## 2023-03-13 PROCEDURE — 82550 ASSAY OF CK (CPK): CPT

## 2023-03-13 PROCEDURE — 36569 INSJ PICC 5 YR+ W/O IMAGING: CPT

## 2023-03-13 PROCEDURE — 99233 SBSQ HOSP IP/OBS HIGH 50: CPT | Performed by: INTERNAL MEDICINE

## 2023-03-13 PROCEDURE — 6370000000 HC RX 637 (ALT 250 FOR IP): Performed by: ORTHOPAEDIC SURGERY

## 2023-03-13 PROCEDURE — 85025 COMPLETE CBC W/AUTO DIFF WBC: CPT

## 2023-03-13 PROCEDURE — 2580000003 HC RX 258: Performed by: ORTHOPAEDIC SURGERY

## 2023-03-13 PROCEDURE — 6370000000 HC RX 637 (ALT 250 FOR IP): Performed by: PHYSICIAN ASSISTANT

## 2023-03-13 PROCEDURE — C1751 CATH, INF, PER/CENT/MIDLINE: HCPCS

## 2023-03-13 PROCEDURE — 6370000000 HC RX 637 (ALT 250 FOR IP): Performed by: INTERNAL MEDICINE

## 2023-03-13 PROCEDURE — 80048 BASIC METABOLIC PNL TOTAL CA: CPT

## 2023-03-13 PROCEDURE — 97530 THERAPEUTIC ACTIVITIES: CPT

## 2023-03-13 PROCEDURE — 6360000002 HC RX W HCPCS: Performed by: INTERNAL MEDICINE

## 2023-03-13 PROCEDURE — 97116 GAIT TRAINING THERAPY: CPT

## 2023-03-13 PROCEDURE — 36415 COLL VENOUS BLD VENIPUNCTURE: CPT

## 2023-03-13 RX ORDER — LINEZOLID 600 MG/1
600 TABLET, FILM COATED ORAL EVERY 12 HOURS SCHEDULED
Qty: 20 TABLET | Refills: 0 | Status: SHIPPED | OUTPATIENT
Start: 2023-03-13 | End: 2023-03-23

## 2023-03-13 RX ORDER — LINEZOLID 600 MG/1
600 TABLET, FILM COATED ORAL EVERY 12 HOURS SCHEDULED
Status: DISCONTINUED | OUTPATIENT
Start: 2023-03-13 | End: 2023-03-13 | Stop reason: HOSPADM

## 2023-03-13 RX ORDER — INSULIN LISPRO 100 [IU]/ML
0-16 INJECTION, SOLUTION INTRAVENOUS; SUBCUTANEOUS
Status: DISCONTINUED | OUTPATIENT
Start: 2023-03-13 | End: 2023-03-13 | Stop reason: HOSPADM

## 2023-03-13 RX ORDER — LINEZOLID 600 MG/1
600 TABLET, FILM COATED ORAL ONCE
Status: COMPLETED | OUTPATIENT
Start: 2023-03-13 | End: 2023-03-13

## 2023-03-13 RX ADMIN — INSULIN LISPRO 2 UNITS: 100 INJECTION, SOLUTION INTRAVENOUS; SUBCUTANEOUS at 09:17

## 2023-03-13 RX ADMIN — POLYETHYLENE GLYCOL 3350 17 G: 17 POWDER, FOR SOLUTION ORAL at 09:16

## 2023-03-13 RX ADMIN — PANTOPRAZOLE SODIUM 40 MG: 40 TABLET, DELAYED RELEASE ORAL at 05:21

## 2023-03-13 RX ADMIN — LINEZOLID 600 MG: 600 TABLET, FILM COATED ORAL at 12:52

## 2023-03-13 RX ADMIN — TORSEMIDE 10 MG: 20 TABLET ORAL at 09:15

## 2023-03-13 RX ADMIN — GABAPENTIN 300 MG: 300 CAPSULE ORAL at 09:15

## 2023-03-13 RX ADMIN — LEVOTHYROXINE SODIUM 125 MCG: 0.12 TABLET ORAL at 05:22

## 2023-03-13 RX ADMIN — ASPIRIN 81 MG: 81 TABLET, COATED ORAL at 09:16

## 2023-03-13 RX ADMIN — Medication 10 ML: at 09:16

## 2023-03-13 RX ADMIN — ACETAMINOPHEN 1000 MG: 500 TABLET ORAL at 12:45

## 2023-03-13 RX ADMIN — ALOGLIPTIN 12.5 MG: 12.5 TABLET, FILM COATED ORAL at 09:16

## 2023-03-13 RX ADMIN — APIXABAN 2.5 MG: 2.5 TABLET, FILM COATED ORAL at 09:15

## 2023-03-13 RX ADMIN — ACETAMINOPHEN 1000 MG: 500 TABLET ORAL at 05:21

## 2023-03-13 RX ADMIN — INSULIN LISPRO 8 UNITS: 100 INJECTION, SOLUTION INTRAVENOUS; SUBCUTANEOUS at 13:01

## 2023-03-13 RX ADMIN — Medication 10 ML: at 09:17

## 2023-03-13 RX ADMIN — VANCOMYCIN HYDROCHLORIDE 750 MG: 750 INJECTION, POWDER, LYOPHILIZED, FOR SOLUTION INTRAVENOUS at 14:33

## 2023-03-13 RX ADMIN — LOSARTAN POTASSIUM 50 MG: 25 TABLET, FILM COATED ORAL at 09:15

## 2023-03-13 RX ADMIN — GABAPENTIN 300 MG: 300 CAPSULE ORAL at 12:45

## 2023-03-13 ASSESSMENT — PAIN - FUNCTIONAL ASSESSMENT
PAIN_FUNCTIONAL_ASSESSMENT: ACTIVITIES ARE NOT PREVENTED
PAIN_FUNCTIONAL_ASSESSMENT: ACTIVITIES ARE NOT PREVENTED

## 2023-03-13 ASSESSMENT — PAIN DESCRIPTION - DESCRIPTORS
DESCRIPTORS: ACHING
DESCRIPTORS: DISCOMFORT

## 2023-03-13 ASSESSMENT — PAIN DESCRIPTION - LOCATION
LOCATION: KNEE
LOCATION: LEG

## 2023-03-13 ASSESSMENT — PAIN DESCRIPTION - ORIENTATION
ORIENTATION: LEFT
ORIENTATION: LEFT

## 2023-03-13 ASSESSMENT — ENCOUNTER SYMPTOMS
RHINORRHEA: 0
SORE THROAT: 0
SINUS PAIN: 0
EYE REDNESS: 0
EYE DISCHARGE: 0
DIARRHEA: 0
COUGH: 0
NAUSEA: 0
ABDOMINAL PAIN: 0
WHEEZING: 0
SINUS PRESSURE: 0
SHORTNESS OF BREATH: 0
BACK PAIN: 0
CONSTIPATION: 0

## 2023-03-13 ASSESSMENT — PAIN SCALES - GENERAL
PAINLEVEL_OUTOF10: 0
PAINLEVEL_OUTOF10: 3
PAINLEVEL_OUTOF10: 0
PAINLEVEL_OUTOF10: 4

## 2023-03-13 ASSESSMENT — PAIN DESCRIPTION - PAIN TYPE: TYPE: SURGICAL PAIN

## 2023-03-13 NOTE — PROGRESS NOTES
Clinical Pharmacy Note: Pharmacy to Dose Vancomycin    Rao Castellanos is a 79 y.o. male started on Vancomycin for Bone and Joint infection; consult received from Dr. Eber Colón to manage therapy. Also receiving the following antibiotics: linezolid. Goal AUC: 400-600 mg/L*hr  Goal Trough Level: 15-20 mcg/mL    Assessment/Plan:  Initiate vancomycin 750 mg IV every 24 hours. A vancomycin random level will be ordered as per ID management. Changes in regimen will be determined based on culture results, renal function, and clinical response. Pharmacy will continue to monitor and adjust regimen as necessary. Allergies:  Patient has no known allergies. Recent Labs     03/12/23  0454 03/13/23  0448   CREATININE 1.5* 1.3       Recent Labs     03/12/23  0454 03/13/23  0449   WBC 5.8 4.4       Ht Readings from Last 1 Encounters:   03/09/23 6' (1.829 m)        Wt Readings from Last 1 Encounters:   03/09/23 270 lb (122.5 kg)         Estimated Creatinine Clearance: 57 mL/min (based on SCr of 1.3 mg/dL). Thank you for the consult,    Trinh Adhikari, PharmD.   PGY-1 Resident  [id number]  03/13/23 1:25 PM

## 2023-03-13 NOTE — CONSULTS
PICC line education:    -Risks  -Benefits  -Alternatives  -Procedure    Discussed the above with patient, verbalized understanding, answered all questions. Provided with information on PICC care to review. PICC tip verified via [employer] (Ok to use). Reported off to patient's  Nurse 4T staff RN.

## 2023-03-13 NOTE — PLAN OF CARE
Problem: Chronic Conditions and Co-morbidities  Goal: Patient's chronic conditions and co-morbidity symptoms are monitored and maintained or improved  3/12/2023 2055 by Eliz Grande RN  Outcome: Progressing

## 2023-03-13 NOTE — PROGRESS NOTES
Infectious Diseases   Progress Note      Admission Date: 3/9/2023  Hospital Day: Hospital Day: 5   Attending: Ruth Ann Vega, *  Date of service: 3/13/2023     Chief complaint/ Reason for consult:     Left knee prosthetic joint infection  S/p left knee surgical debridement and hardware retention with polyethylene exchange on 3/9/2023  History of index left knee arthroplasty several years ago followed by revision arthroplasty 16 years later  History of recurrent leg cellulitis    Microbiology:        I have reviewed allavailable micro lab data and cultures    Left knee surgical  culture  - collected on 3/9/2023: in process      Antibiotics and immunizations:       Current antibiotics: All antibiotics and their doses were reviewed by me    Recent Abx Admin                     linezolid (ZYVOX) IVPB 600 mg (mg) 600 mg New Bag 03/12/23 2338     600 mg New Bag  1350                      Immunization History: All immunization history was reviewed by me today. There is no immunization history on file for this patient. Known drug allergies: All allergies were reviewed and updated    No Known Allergies    Social history:     Social History:  All social andepidemiologic history was reviewed and updated by me today as needed. Tobacco use:   reports that he has never smoked. He has never used smokeless tobacco.  Alcohol use:   reports current alcohol use. Currently lives in: [address] [place] [address]   reports no history of drug use.      COVID VACCINATION AND LAB RESULT RECORDS:     Internal Administration   First Dose      Second Dose           Last COVID Lab No results found for: SARS-COV-2, SARS-COV-2 RNA, SARS-COV-2, SARS-COV-2, SARS-COV-2 BY PCR, SARS-COV-2, SARS-COV-2, SARS-COV-2         Assessment:     The patient is a 79 y.o. old male who  has a past medical history of Arthritis, Atrial fibrillation ([facility] CAD (coronary artery disease), Diabetes mellitus ([facility] Hyperlipidemia, Hypertension, and Thyroid disease. with following problems:    Left knee prosthetic joint infection-we will start IV vancomycin  S/p left knee surgical debridement and hardware retention with polyethylene exchange on 3/9/2023  History of index left knee arthroplasty several years ago followed by revision arthroplasty 16 years later  History of recurrent leg cellulitis  Essential hypertension-BP controlled  Mixed hyperlipidemia  History of atrial fibrillation-rate controlled  Coronary artery disease  Type 2 diabetes mellitus -maintain good glycemic control, counseling done  Obesity Class 2 due to excess calorie intake : Body mass index is 36.62 kg/m². Discussion:      The patient is afebrile. He is on IV linezolid. White cell count is 4400 today. Serum creatinine is 1.3. CK level is 625 today. Platelet count is 153,756. Left knee surgical culture from 3/9/2023 interestingly remains negative. Plan:     Diagnostic Workup:      Continue to follow  fever curve, WBC count and blood cultures. Continue to monitor blood counts, liver and renal function. Antimicrobials:    His CK level is slowly improving but not down enough to safely start IV daptomycin. His serum creatinine is 1.3. I will start IV vancomycin at a conservative dose of 750 mg daily given his advanced age and renal issues. Will titrate vancomycin dose as an outpatient with the help of ID pharmacist Michael Westbrook   We will switch IV linezolid to p.o. linezolid 600 mg every 12 hours  We will continue linezolid for another 10 days to give time for vancomycin levels to stabilize  Plan for 6 weeks of IV vancomycin for now  Continue close vitals monitoring. Maintain good glycemic control. Fall precautions. Aspiration precautions. Continue to watch for new fever or diarrhea. DVT prophylaxis. Discussed all above with patient and RN. CAYLA has been updated with infusion orders as follows:                           Out-patient antibiotic therapy (OPAT)/ Antibiotic Infusion orders          Diagnosis: Left knee prosthetic joint infection       Organism/ culture: Corynebacterium striatum     Name and dose of Antimicrobial: IV vancomycin 750 mg every 24 hours, p.o. linezolid 600 mg every 12 hours     Antimicrobial start date: calculated from 3/13/2023     Antimicrobial completion date planned: Stop date for oral linezolid will be 3/23/2023 and stop date for IV vancomycin will be April 28, 2023       Lab monitoring: CBC, Chem 7 ESR, CRP, vancomycin trough once a week, to be collected every Monday or Tuesday morning until the patient is off IV  antibiotics. Fax weekly lab results to Talha Martinez MD's office at        [phone]. Please maintain PICC line until the patient is on IV antibiotics. Ok to administer PICC line normal saline flushes as needed. The patient has given verbal informed consent for [facility] pharmacist, Maria Alejandra Olvera to manage above antibiotic therapy for the patient after the patient's discharge from the hospital.       ** Please notify Talha Martinez MD's office with any change in patient's        status, transfer out of facility or to hospital by calling [phone]           Outpatient Follow up: Plan for a follow-up in-person or virtual video visit with me in 4-5 weeks. Call my office at [phone] to make an appointment. Physician Signature:  Electronically signed by Talha Martinez MD on                                                      3/13/23 at 12:43 PM EDT          Drug Monitoring:    Continue monitoring for antibiotic toxicity as follows: CBC, CMP, vancomycin trough  Continue to watch for following: new or worsening fever, new hypotension, hives, lip swelling and redness or purulence at vascular access sites. I/v access Management:    Continue to monitor i.v access sites for erythema, induration, discharge or tenderness.    As always, continue efforts to minimize tubes/lines/drains as clinically appropriate to reduce chances of line associated infections. Patient education and counseling: The patient was educated in detail about the side-effects of various antibiotics and things to watch for like new rashes, lip swelling, severe reaction, worsening diarrhea, break through fever etc.  Discussed patient's condition and what to expect. All of the patient's questions were addressed in a satisfactory manner and patient verbalized understanding all instructions. Level of complexity of visit and medical decision making: High     Diabetes mellitus education and counseling:    Patient was educated in detail about the importance of keeping diabetes under control to improve the cure rate of infection and prevent future infections. Patient was advised to check blood glucose level regularly and to stay compliant with the diabetes medications. Patient was advised to the trim the toe nails carefully, wear shoes or slippers at all times and check both feet everyday before going to bed to look for any cuts, blisters, swelling or redness. Importance of washing the feet everyday with soap and water and keeping them dry, and seeking prompt medical attention in case of a non-healing wound or ulcer on the feet was also highlighted. TIME SPENT TODAY:     - Spent over  38 minutes on visit (including interval history, physical exam, review of data including labs, cultures, imaging, development and implementation of treatment plan and coordination of complex care). More than 50 percent of this includes face-to-face time spent with the patient for counseling and coordination of care. Thank you for involving me in the care of your patient. I will continue to follow. If you have anyadditional questions, please do not hesitate to contact me. Subjective: Interval history: Interval history was obtained from chart review and patient/ RN. He is afebrile. He is tolerating antibiotics okay.   No diarrhea     REVIEW OF SYSTEMS:      Review of Systems   Constitutional:  Positive for fatigue. Negative for chills, diaphoresis and fever. HENT:  Negative for ear discharge, ear pain, postnasal drip, rhinorrhea, sinus pressure, sinus pain and sore throat. Eyes:  Negative for discharge and redness. Respiratory:  Negative for cough, shortness of breath and wheezing. Cardiovascular:  Negative for chest pain and leg swelling. Gastrointestinal:  Negative for abdominal pain, constipation, diarrhea and nausea. Endocrine: Negative for cold intolerance, heat intolerance and polydipsia. Genitourinary:  Negative for dysuria, flank pain, frequency, hematuria and urgency. Musculoskeletal:  Negative for back pain and myalgias. Skin:  Negative for rash. Allergic/Immunologic: Negative for immunocompromised state. Neurological:  Negative for dizziness, seizures and headaches. Hematological:  Does not bruise/bleed easily. Psychiatric/Behavioral:  Negative for agitation, hallucinations and suicidal ideas. The patient is not nervous/anxious. All other systems reviewed and are negative. Past Medical History: All past medical history reviewed today. Past Medical History:   Diagnosis Date    Arthritis     Atrial fibrillation ([facility]     CAD (coronary artery disease)     Diabetes mellitus ([facility]     Hyperlipidemia     Hypertension     Thyroid disease        Past Surgical History: All past surgical history was reviewed today.     Past Surgical History:   Procedure Laterality Date    BACK SURGERY      cervical   lumbar    CARDIAC SURGERY      bypassx4    CAROTID ENDARTERECTOMY      FOOT SURGERY      left    JOINT REPLACEMENT      KNEE ARTHROTOMY Left 3/9/2023    LEFT KNEE IRRIGATION AND DEBRIDEMENT-ADDUCTOR BLOCK; Payton Gong performed by Roosevelt Gill MD at [address] Left 7/14/2022    REVISION LEFT TOTAL KNEE ARTHROPLASTY-CHERRY; ADDUCTOR BLOCK performed by Roosevelt Gill MD at [facility] OR    REVISION TOTAL KNEE ARTHROPLASTY Left 3/9/2023    LEFT KNEE POLYETHLENE EXCHANGE WITH SYNOVECTOMY, AN\TIBIOTIC BEAD PLACEMENT performed by Ronald Rodriguez MD at [facility]         Family History: All family history was reviewed today. History reviewed. No pertinent family history. Objective:       PHYSICAL EXAM:      Vitals:   Vitals:    03/13/23 0035 03/13/23 0521 03/13/23 0900 03/13/23 1140   BP: (!) 146/87 120/73 114/67 134/75   Pulse: 65 64 66 65   Resp: 18 18 18 18   Temp: 97.5 °F (36.4 °C) 97.3 °F (36.3 °C) 98 °F (36.7 °C) 97.4 °F (36.3 °C)   TempSrc: Axillary Oral Oral Oral   SpO2: 93% 94% 98% 95%   Weight:       Height:           Physical Exam  Vitals and nursing note reviewed. Constitutional:       Appearance: He is well-developed. He is not diaphoretic. Comments: The patient was seen earlier today. HENT:      Head: Normocephalic and atraumatic. Right Ear: External ear normal. There is no impacted cerumen. Left Ear: External ear normal. There is no impacted cerumen. Nose: Nose normal.      Mouth/Throat:      Mouth: Mucous membranes are moist.      Pharynx: Oropharynx is clear. No oropharyngeal exudate. Eyes:      General: No scleral icterus. Right eye: No discharge. Left eye: No discharge. Conjunctiva/sclera: Conjunctivae normal.      Pupils: Pupils are equal, round, and reactive to light. Neck:      Thyroid: No thyromegaly. Cardiovascular:      Rate and Rhythm: Normal rate and regular rhythm. Heart sounds: Normal heart sounds. No murmur heard. No friction rub. Pulmonary:      Effort: No respiratory distress. Breath sounds: No stridor. No wheezing or rales. Abdominal:      General: Bowel sounds are normal.      Palpations: Abdomen is soft. Tenderness: There is no abdominal tenderness. There is no guarding or rebound. Musculoskeletal:         General: No swelling, tenderness or deformity.  Normal range of motion. Cervical back: Normal range of motion and neck supple. Right lower leg: No edema. Left lower leg: No edema. Comments: Surgical dressing on the left knee   Lymphadenopathy:      Cervical: No cervical adenopathy. Skin:     General: Skin is warm and dry. Coloration: Skin is not jaundiced. Findings: No bruising, erythema or rash. Neurological:      General: No focal deficit present. Mental Status: He is alert and oriented to person, place, and time. Mental status is at baseline. Motor: No abnormal muscle tone. Psychiatric:         Mood and Affect: Mood normal.         Behavior: Behavior normal.     *    Lines and drains: All vascular access sites are healthy with no local erythema, discharge or tenderness. Intake and output:    I/O last 3 completed shifts: In: 80 [P.O.:480; I.V.:300]  Out: 1700 [Urine:1700]    Lab Data:   All available labs and old records have been reviewed by me.     CBC:  Recent Labs     03/11/23  0555 03/12/23 0454 03/13/23  0449   WBC 6.5 5.8 4.4   RBC 2.45* 2.68* 2.79*   HGB 8.5* 9.0* 9.4*   HCT 25.7* 28.2* 28.7*    184 190   .9* 105.0* 103.1*   MCH 34.6* 33.5 33.6   MCHC 33.0 31.9 32.6   RDW 20.7* 20.6* 20.0*          BMP:  Recent Labs     03/11/23  0555 03/12/23 0454 03/13/23  0448   * 136 136   K 4.9 4.6 4.2    103 99   CO2 30 29 29   BUN 36* 31* 28*   CREATININE 1.7* 1.5* 1.3   CALCIUM 9.0 9.4 9.8   GLUCOSE 152* 228* 218*          Hepatic Function Panel:   Lab Results   Component Value Date/Time    ALKPHOS 108 03/09/2023 10:05 PM    ALT 58 03/09/2023 10:05 PM    AST 92 03/09/2023 10:05 PM    PROT 6.8 03/09/2023 10:05 PM    BILITOT <0.2 03/09/2023 10:05 PM    LABALBU 3.4 03/09/2023 10:05 PM       CPK:   Lab Results   Component Value Date    CKTOTAL 625 (H) 03/13/2023     ESR:   Lab Results   Component Value Date    SEDRATE 28 (H) 03/09/2023     CRP:   Lab Results   Component Value Date    CRP 12.5 (H) 03/09/2023           Imaging: All pertinent images and reports for the current visit were reviewed by me during this visit. I reviewed the chest x-ray/CT scan/MRI images and independently interpreted the findings and results today. XR KNEE LEFT (1-2 VIEWS)   Final Result   Previous total left knee replacement which is unchanged in position with bone   graft material seen scattered along the distal femur and proximal tibia. There are extensive postop changes in the soft tissues anteriorly and   extending into the suprapatellar bursa which is more prominent. Medications: All current and past medications were reviewed.      insulin lispro  0-16 Units SubCUTAneous TID WC    polyethylene glycol  17 g Oral Daily    lidocaine 1 % injection  5 mL IntraDERmal Once    sodium chloride flush  5-40 mL IntraVENous 2 times per day    apixaban  2.5 mg Oral BID    linezolid  600 mg IntraVENous Q12H    midazolam  2 mg IntraVENous Once    amLODIPine  2.5 mg Oral Nightly    aspirin  81 mg Oral Daily    [Held by provider] atorvastatin  40 mg Oral Nightly    gabapentin  300 mg Oral 4x Daily    levothyroxine  125 mcg Oral Daily    losartan  50 mg Oral Daily    pantoprazole  40 mg Oral QAM AC    torsemide  10 mg Oral Daily    sodium chloride flush  5-40 mL IntraVENous 2 times per day    acetaminophen  1,000 mg Oral 3 times per day    alogliptin  12.5 mg Oral Daily    insulin lispro  0-4 Units SubCUTAneous Nightly        sodium chloride      sodium chloride 5 mL/hr at 03/12/23 0018    dextrose         sodium chloride flush, sodium chloride, sodium chloride flush, sodium chloride, oxyCODONE **OR** oxyCODONE, HYDROmorphone **OR** HYDROmorphone, bisacodyl, hydrALAZINE, sodium chloride, potassium chloride **OR** potassium alternative oral replacement **OR** potassium chloride, dextrose bolus **OR** dextrose bolus, glucagon (rDNA), dextrose      Problem list:       Patient Active Problem List   Diagnosis Code    Failed total knee, left, 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    Failed total knee, right, subsequent encounter T84.012D    Essential hypertension I10    Hypothyroid E03.9    Diabetes mellitus type 2 in obese (HCC) E11.69, E66.9    Acute blood loss as cause of postoperative anemia D62    Preop testing Z01.818    Coronary artery disease due to lipid rich plaque I25.10, I25.83    Class 2 obesity due to excess calories with body mass index (BMI) of 36.0 to 36.9 in adult E66.09, Z68.36    History of atrial fibrillation Z86.79    Mixed hyperlipidemia E78.2    Recurrent cellulitis of lower extremity L03.119    Infection of prosthetic left knee joint (HCC) T84.54XA    Direct infection of left knee in infectious and parasitic diseases classified elsewhere ([facility] M01. U40       Please note that this chart was generated using Dragon dictation software. Although every effort was made to ensure the accuracy of this automated transcription, some errors in transcription may have occurred inadvertently. If you may need any clarification, please do not hesitate to contact me through EPIC or at the phone number provided below with my electronic signature. Any pictures or media included in this note were obtained after taking informed verbal consent from the patient and with their approval to include those in the patient's medical record. Tima Delgado MD, MPH, [address] [place] [address]  3/13/2023, 12:39 PM  [facility] Infectious Disease   [address] [place] [address] ([address] [place] [address])  [address]  Office: [phone]  Fax: [phone]  In-person Clinic days:  Tuesday & Thursday a.m. Virtual clinic days: Monday, Wednesday & Friday a.m.

## 2023-03-13 NOTE — PLAN OF CARE
Problem: Chronic Conditions and Co-morbidities  Goal: Patient's chronic conditions and co-morbidity symptoms are monitored and maintained or improved  Outcome: Completed     Problem: Discharge Planning  Goal: Discharge to home or other facility with appropriate resources  Outcome: Completed     Problem: Safety - Adult  Goal: Free from fall injury  Outcome: Completed     Problem: ABCDS Injury Assessment  Goal: Absence of physical injury  Outcome: Completed     Problem: Skin/Tissue Integrity  Goal: Absence of new skin breakdown  Description: 1. Monitor for areas of redness and/or skin breakdown  2. Assess vascular access sites hourly  3. Every 4-6 hours minimum:  Change oxygen saturation probe site  4. Every 4-6 hours:  If on nasal continuous positive airway pressure, respiratory therapy assess nares and determine need for appliance change or resting period.   Outcome: Completed     Problem: Pain  Goal: Verbalizes/displays adequate comfort level or baseline comfort level  Outcome: Completed  Flowsheets (Taken 3/13/2023 0900)  Verbalizes/displays adequate comfort level or baseline comfort level: Encourage patient to monitor pain and request assistance

## 2023-03-13 NOTE — PROGRESS NOTES
Progress Note - Dr. Mcgowan - Internal Medicine  PCP: Katie  [address] [place] / Emelia Altamirano [phone]    Hospital Day: 4  Code Status: Full Code  Current Diet: ADULT DIET; Regular; 4 carb choices (60 gm/meal)        CC: follow up on medical issues    Subjective:   Antony Link is a 79 y.o. male. Pt seen and examined  Chart reviewed since last visit, labs and imaging below      Doing ok  Some bleeding to surgical site; but better than yest    Per ID (3/10) -   Will order IV linezolid 600 mg every 12 hours for now for corynebacterium coverage. Due to risk of thrombocytopenia, IV linezolid cannot be continued for long-term  We will consider switching patient's antibiotics to IV vancomycin and IV daptomycin on Monday based on creatinine and CK levels    WBC 4.4 today    Review of Systems: (1 system for EPF, 2-9 for detailed, 10+ for comprehensive)  Constitutional: Negative for chills and fever. HENT: Negative for dental problem, nosebleeds and rhinorrhea. Eyes: Negative for photophobia and visual disturbance. Respiratory: Negative for cough, chest tightness and shortness of breath. Cardiovascular: Negative for chest pain and leg swelling. Gastrointestinal: Negative for diarrhea, nausea and vomiting. Endocrine: Negative for polydipsia and polyphagia. Genitourinary: Negative for frequency, hematuria and urgency. Musculoskeletal: Negative for back pain and myalgias. Positive for kneep ain  Skin: Negative for rash. Allergic/Immunologic: Negative for food allergies. Neurological: Negative for dizziness, seizures, syncope and facial asymmetry. Hematological: Negative for adenopathy. Psychiatric/Behavioral: Negative for dysphoric mood. The patient is not nervous/anxious. I have reviewed the patient's medical and social history in detail and updated the computerized patient record.   To recap: He  has a past medical history of Arthritis, Atrial fibrillation ([facility] CAD (coronary artery disease), Diabetes mellitus ([facility] Hyperlipidemia, Hypertension, and Thyroid disease. Lauren Matthew He  has a past surgical history that includes Cardiac surgery; Carotid endarterectomy; sinus surgery; joint replacement; back surgery; Foot surgery; Revision total knee arthroplasty (Left, 7/14/2022); Knee Arthrotomy (Left, 3/9/2023); and Revision total knee arthroplasty (Left, 3/9/2023). Lauren Matthew He  reports that he has never smoked. He has never used smokeless tobacco. He reports current alcohol use. He reports that he does not use drugs. .        Active Hospital Problems    Diagnosis Date Noted    Coronary artery disease due to lipid rich plaque [I25.10, I25.83] 03/09/2023     Priority: Medium    Class 2 obesity due to excess calories with body mass index (BMI) of 36.0 to 36.9 in adult [E66.09, Z68.36] 03/09/2023     Priority: Medium    History of atrial fibrillation [Z86.79] 03/09/2023     Priority: Medium    Mixed hyperlipidemia [E78.2] 03/09/2023     Priority: Medium    Recurrent cellulitis of lower extremity [L03.119] 03/09/2023     Priority: Medium    Infection of prosthetic left knee joint ([facility] Seals Carolus 03/09/2023     Priority: Medium    Direct infection of left knee in infectious and parasitic diseases classified elsewhere ([facility] [M01. X62] 03/09/2023     Priority: Medium    Essential hypertension [I10] 07/14/2022     Priority: Medium    Diabetes mellitus type 2 in obese ([facility] [E11.69, E66.9] 07/14/2022     Priority: Medium       Current Facility-Administered Medications: polyethylene glycol (GLYCOLAX) packet 17 g, 17 g, Oral, Daily  lidocaine PF 1 % injection 5 mL, 5 mL, IntraDERmal, Once  sodium chloride flush 0.9 % injection 5-40 mL, 5-40 mL, IntraVENous, 2 times per day  sodium chloride flush 0.9 % injection 5-40 mL, 5-40 mL, IntraVENous, PRN  0.9 % sodium chloride infusion, 25 mL, IntraVENous, PRN  apixaban (ELIQUIS) tablet 2.5 mg, 2.5 mg, Oral, BID  linezolid (ZYVOX) IVPB 600 mg, 600 mg, IntraVENous, Q12H  midazolam PF (VERSED) injection 2 mg, 2 mg, IntraVENous, Once  amLODIPine (NORVASC) tablet 2.5 mg, 2.5 mg, Oral, Nightly  aspirin EC tablet 81 mg, 81 mg, Oral, Daily  [Held by provider] atorvastatin (LIPITOR) tablet 40 mg, 40 mg, Oral, Nightly  gabapentin (NEURONTIN) capsule 300 mg, 300 mg, Oral, 4x Daily  levothyroxine (SYNTHROID) tablet 125 mcg, 125 mcg, Oral, Daily  losartan (COZAAR) tablet 50 mg, 50 mg, Oral, Daily  pantoprazole (PROTONIX) tablet 40 mg, 40 mg, Oral, QAM AC  torsemide (DEMADEX) tablet 10 mg, 10 mg, Oral, Daily  sodium chloride flush 0.9 % injection 5-40 mL, 5-40 mL, IntraVENous, 2 times per day  sodium chloride flush 0.9 % injection 5-40 mL, 5-40 mL, IntraVENous, PRN  0.9 % sodium chloride infusion, , IntraVENous, PRN  acetaminophen (TYLENOL) tablet 1,000 mg, 1,000 mg, Oral, 3 times per day  oxyCODONE (ROXICODONE) immediate release tablet 5 mg, 5 mg, Oral, Q4H PRN **OR** oxyCODONE (ROXICODONE) immediate release tablet 10 mg, 10 mg, Oral, Q4H PRN  HYDROmorphone HCl PF (DILAUDID) injection 0.25 mg, 0.25 mg, IntraVENous, Q3H PRN **OR** HYDROmorphone HCl PF (DILAUDID) injection 0.5 mg, 0.5 mg, IntraVENous, Q3H PRN  bisacodyl (DULCOLAX) suppository 10 mg, 10 mg, Rectal, Daily PRN  alogliptin (NESINA) tablet 12.5 mg, 12.5 mg, Oral, Daily  hydrALAZINE (APRESOLINE) injection 10 mg, 10 mg, IntraVENous, Q6H PRN  0.9 % sodium chloride bolus, 500 mL, IntraVENous, PRN  potassium chloride (KLOR-CON M) extended release tablet 40 mEq, 40 mEq, Oral, PRN **OR** potassium bicarb-citric acid (EFFER-K) effervescent tablet 40 mEq, 40 mEq, Oral, PRN **OR** potassium chloride 10 mEq/100 mL IVPB (Peripheral Line), 10 mEq, IntraVENous, PRN  insulin lispro (HUMALOG) injection vial 0-8 Units, 0-8 Units, SubCUTAneous, TID WC  insulin lispro (HUMALOG) injection vial 0-4 Units, 0-4 Units, SubCUTAneous, Nightly  dextrose bolus 10% 125 mL, 125 mL, IntraVENous, PRN **OR** dextrose bolus 10% 250 mL, 250 mL, IntraVENous, PRN  glucagon (rDNA) injection 1 mg, 1 mg, SubCUTAneous, PRN  dextrose 10 % infusion, , IntraVENous, Continuous PRN         Objective:  /73   Pulse 64   Temp 97.3 °F (36.3 °C) (Oral)   Resp 18   Ht 6' (1.829 m)   Wt 270 lb (122.5 kg)   SpO2 94%   BMI 36.62 kg/m²      Patient Vitals for the past 24 hrs:   BP Temp Temp src Pulse Resp SpO2   03/13/23 0521 120/73 97.3 °F (36.3 °C) Oral 64 18 94 %   03/13/23 0035 (!) 146/87 97.5 °F (36.4 °C) Axillary 65 18 93 %   03/12/23 2045 128/72 96.9 °F (36.1 °C) Oral 60 18 95 %   03/12/23 1030 -- -- Oral 63 -- --       Patient Vitals for the past 96 hrs (Last 3 readings):   Weight   03/09/23 1313 270 lb (122.5 kg)             Intake/Output Summary (Last 24 hours) at 3/13/2023 2854  Last data filed at 3/13/2023 4379  Gross per 24 hour   Intake 480 ml   Output 1700 ml   Net -1220 ml           Physical Exam: (2-7 system for EPF/Detailed, ?8 for Comprehensive)  /73   Pulse 64   Temp 97.3 °F (36.3 °C) (Oral)   Resp 18   Ht 6' (1.829 m)   Wt 270 lb (122.5 kg)   SpO2 94%   BMI 36.62 kg/m²   Constitutional: vitals as above: alert, appears stated age and cooperative    Psychiatric: normal insight and judgment, oriented to person, place, time, and general circumstances    Head: Normocephalic, without obvious abnormality, atraumatic    Eyes:lids and lashes normal, conjunctivae and sclerae normal and pupils equal, round, reactive to light and accomodation    EMNT: external ears normal, nares midline    Neck: no carotid bruit, supple, symmetrical, trachea midline and thyroid not enlarged, symmetric, no tenderness/mass/nodules     Respiratory: clear to auscultation and percussion bilaterally with normal respiratory effort    Cardiovascular: normal rate, regular rhythm, normal S1 and S2 and no murmurs    Gastrointestinal: soft, non-tender, non-distended, normal bowel sounds, no masses or organomegaly    Extremities: no clubbing, no edema  ; L knee dressing cdi  Skin:No rashes or nodules noted. Neurologic:negative         Labs:  Lab Results   Component Value Date    WBC 4.4 03/13/2023    HGB 9.4 (L) 03/13/2023    HCT 28.7 (L) 03/13/2023     03/13/2023    ALT 58 (H) 03/09/2023    AST 92 (H) 03/09/2023     03/13/2023    K 4.2 03/13/2023    CL 99 03/13/2023    CREATININE 1.3 03/13/2023    BUN 28 (H) 03/13/2023    CO2 29 03/13/2023    INR 1.13 03/09/2023    LABA1C 7.2 03/10/2023    LABMICR Not Indicated 06/22/2022     Lab Results   Component Value Date    TQYDOFA 109 (H) 03/13/2023       Recent Imaging Results are Reviewed:  XR KNEE LEFT (1-2 VIEWS)    Result Date: 3/9/2023  EXAMINATION: 4 XRAY VIEWS OF THE LEFT KNEE 3/9/2023 6:02 pm COMPARISON: 07/14/2022. HISTORY: ORDERING SYSTEM PROVIDED HISTORY: post op film TECHNOLOGIST PROVIDED HISTORY: Of operative side while in recovery room. Reason for exam:->post op film Reason for Exam: post op films FINDINGS: There is a metallic prosthesis in the distal femur and proximal tibia which are unchanged and are held in place methylmethacrylate. There are postop changes along the patella which is unchanged. There are numerous rounded densities consistent with bone grafts seen along the diametaphyseal region of the distal femur bilaterally extending inferiorly around the joint and proximal tibia which is more apparent. There are surgical clips in the soft tissues laterally. There is moderate edema and subcutaneous air in the soft tissues anteriorly extending into the suprapatellar bursa. Previous total left knee replacement which is unchanged in position with bone graft material seen scattered along the distal femur and proximal tibia. There are extensive postop changes in the soft tissues anteriorly and extending into the suprapatellar bursa which is more prominent.        Lab Results   Component Value Date/Time    GLUCOSE 218 03/13/2023 04:48 AM     Lab Results   Component Value Date/Time    POCGLU 236 03/13/2023 07:22 AM     /73   Pulse 64   Temp 97.3 °F (36.3 °C) (Oral)   Resp 18   Ht 6' (1.829 m)   Wt 270 lb (122.5 kg)   SpO2 94%   BMI 36.62 kg/m²     Assessment and Plan:  Principal Problem:    Direct infection of left knee in infectious and parasitic diseases classified elsewhere ([facility] -Established problem. Stable. Some bleeding to dressing  Plan: stay on post op pathway. To work with pt/ot today. Transition to oral pain meds. Cont to follow h/h to assess post op anemia. Active Problems:    Essential hypertension -Established problem. Stable. 120/73  Plan: Continue medications. Continue to check BP q shift. CBC and BMP ordered to monitor for disease progression, medication side effect. Diabetes mellitus type 2 in obese [facility]) -Established problem. Stable. Glu 218  Plan: Continue on CCC diet, home medications. CBC and BMP ordered to monitor sugars and for other medication complications. Fingerstick glucose ordered to check for alterations in levels throughout day. Sliding scale insulin ordered to cover fingerstick levels. Coronary artery disease due to lipid rich plaque    Class 2 obesity due to excess calories with body mass index (BMI) of 36.0 to 36.9 in adult    History of atrial fibrillation    Mixed hyperlipidemia  Plan: cont on home meds    Recurrent cellulitis of lower extremity    Infection of prosthetic left knee joint ([facility]  Plan: on iv abx per ID. Awaiting cx results - per ID: consider switching patient's antibiotics to IV vancomycin and IV daptomycin today. Await repeat ID eval      Case discussed with: ortho  Tests ordered/reviewed: cbc, bmp, cultures    Disp - medically stable.  Home when ok with ID      (Please note that portions of this note were completed with a voice recognition program.  Efforts were made to edit the dictations but occasionally words are mis-transcribed.)        Smitha Pierce MD  3/13/2023

## 2023-03-13 NOTE — PROGRESS NOTES
Patient discharged to home,writer went over all discharge instructions with pt and his wife and they both verbalized understanding. Pt in no distress at time of discharge,picked up Zyvox from pharmacy.

## 2023-03-13 NOTE — PROGRESS NOTES
[facility] [address] Department   Phone: [phone]    Physical Therapy    [] Initial Evaluation            [x] Daily Treatment Note         [] Discharge Summary      Patient: Rony Severino   : 1940   MRN: [id number]   Date of Service:  3/13/2023  Admitting Diagnosis: Direct infection of left knee in infectious and parasitic diseases classified elsewhere [facility])  Current Admission Summary: The pt was admited with a L knee infection. S/p I&D with polyethylene exchange and antibiotic bead placement on 3/9. Past Medical History:  has a past medical history of Arthritis, Atrial fibrillation ([facility] CAD (coronary artery disease), Diabetes mellitus ([facility] Hyperlipidemia, Hypertension, and Thyroid disease. Past Surgical History:  has a past surgical history that includes Cardiac surgery; Carotid endarterectomy; sinus surgery; joint replacement; back surgery; Foot surgery; Revision total knee arthroplasty (Left, 2022); Knee Arthrotomy (Left, 3/9/2023); and Revision total knee arthroplasty (Left, 3/9/2023). Discharge Recommendations: Rony Severino scored a 16/24 on the AM-PAC short mobility form. Current research shows that an AM-PAC score of 18 or greater is typically associated with a discharge to the patient's home setting. Based on the patient's AM-PAC score and their current functional mobility deficits, it is recommended that the patient have 2-3 sessions per week of Physical Therapy at d/c to increase the patient's independence. At this time, this patient demonstrates the endurance and safety to discharge home with home services and a follow up treatment frequency of 2-3x/wk. Please see assessment section for further patient specific details.     HOME HEALTH CARE: LEVEL 3 SAFETY     - Initial home health evaluation to occur within 24-48 hours, in patient home   - Therapy evaluations in home within 24-48 hours of discharge; including DME and home safety   - Chiquis Chaudhry therapy 5 days, then 3x a week   - Therapy to evaluate if patient has [address] needs for personal care   -  evaluation within 24-48 hours, includes evaluation of resources and insurance to determine [place], [place], LTC, and Medicaid options      DME Required For Discharge: patient has all required DME for discharge  Precautions/Restrictions: high fall risk  Weight Bearing Restrictions: weight bearing as tolerated  [] Right Upper Extremity  [] Left Upper Extremity [] Right Lower Extremity  [x] Left Lower Extremity     Required Braces/Orthotics: knee immobilizer, KI when OOB, no knee ROM , quad sets, ankle pumps, transfers, gait training in knee immob   [] Right  [x] Left  Positional Restrictions:no positional restrictions    Pre-Admission Information   Lives With: spouse                  Type of Home: house  Home Layout: one level  Home Access: level entry  [facility]: walk in shower, . Comment: bathroom not accessible with RW  Toilet Height: elevated height  Bathroom Equipment: grab bars in shower, shower chair, hand held shower head  Home Equipment: rollator - 4 wheeled walker, rolling walker  Transfer Assistance: Mod I with [id number]  Ambulation Assistance: modified independent with use of 4WW d/t pain  ADL Assistance: Independent with ADLs  IADL Assistance: requires assistance with all homemaking tasks, requires assistance with meal prep, requires assistance with laundry, requires assistance with vacuuming, requires assistance with cleaning, pt empties , pt gets his own breakfast and lunch  Active :        [x] Yes                 [] No--does not drive at night or in the rain. Current Employment: retired--. Hobbies: hang out with friends and go to [facility]: No recent falls since x1 year ago. Comment: pt battling cellulitis in LLE        Subjective  General: Patient supine in bed upon arrival. Patient is agreeable to PT.  Knee immobilizer in place upon arrival. Wife present   Pain: 0/10  Pain Interventions: pain medication in place prior to arrival and repositioned        Functional Mobility  Bed Mobility  Supine to Sit: stand by assistance  Scooting: stand by assistance  Comments: HOB elevated, use of bedrail  Transfers  Sit to stand transfer: moderate assistance  Stand to sit transfer: minimal assistance  Comments: Pt coming to stand from low height bed. Pt requiring increased assistance secondary to completing multiple reps. Pt has in room and has utilized in past a lift device for chairs at home. Ambulation  Surface:level surface  Assistive Device: rolling walker  Assistance: contact guard assistance  Distance: 15ft+15ft + ~30 ft  Gait Mechanics: step to gait pattern, slow jackson, heavy UE WB on RW   Comments:  Pt fatigues quickly with ambulation. Stair Mobility  Stair mobility not completed on this date. Comments:  Wheelchair Mobility:  No w/c mobility completed on this date. Comments:  Balance  Static Sitting Balance: fair (+): maintains balance at SBA/supervision without use of UE support  Dynamic Sitting Balance: fair: maintains balance at CGA without use of UE support  Static Standing Balance: fair (-): maintains balance at CGA with use of UE support  Dynamic Standing Balance: fair (-): maintains balance at CGA with use of UE support  Comments: Pt requires assistance with brief management at toilet and to doff soiled brief and don/doff footwear    Other Therapeutic Interventions  Pt agreeable to PT session though reports discharging home today. After getting to EOB, pt reporting need to use restroom. PT assisting pt to ambulate to restroom but pt unable to reach toilet before urinating slightly. Pt also with external catheter in place and brief already wet when coming to stand. PT assisting pt to stand and urinate into toilet remainder of urine with external catheter before returning to EOB.  PT then assisting with doffing soiled brief and donning clean brief and gown. Pt then ambulating as noted above. Functional Outcomes  AM-PAC Inpatient Mobility Raw Score : 16              Cognition  WFL  Orientation:    alert and oriented x 4  Command Following:   [facility]  Barriers To Learning: none  Patient Education: patient educated on PT role and benefits, plan of care, transfer training, discharge recommendations  Learning Assessment:  patient verbalizes and demonstrates understanding    Assessment  Activity Tolerance: Fair; limited by weakness and decreased endurance   Impairments Requiring Therapeutic Intervention: decreased functional mobility, decreased ROM, decreased strength, decreased endurance, decreased balance, increased pain  Prognosis: good  Clinical Assessment: Patient continues to require mod A from recliner chair this date. Pt fatigues quickly, but ambulates an increased distance this date compared to previous session. Patient will continue to benefit from skilled PT in order to return to OF with all functional mobility prior to d/c from hospital.   Safety Interventions: patient left in chair, chair alarm in place, call light within reach, gait belt, patient at risk for falls, nurse notified, and family/caregiver present    Plan  Frequency: 7 x/week  Current Treatment Recommendations: strengthening, ROM, balance training, functional mobility training, transfer training, gait training, endurance training, neuromuscular re-education, and safety education    Goals  Patient Goals: to DC home   Short Term Goals:  Time Frame:  To be met prior to Dc  Patient will complete bed mobility at modified independent   Patient will complete transfers at modified independent   Patient will ambulate 100 ft with use of rolling walker at supervision  Patient will complete car transfer at minimal assistance  NO GOALS MET 3/13    Therapy Session Time      Individual Group Co-treatment   Time In 1357       Time Out 1451       Minutes 54         Timed Code Treatment Minutes:   54  Total Treatment Minutes: 54       Electronically Signed By: Flako Staples, PT   Flako Staples PT, DPT, [id number]

## 2023-03-13 NOTE — FLOWSHEET NOTE
03/12/23 2051   Assessment   Charting Type Shift assessment   Psychosocial   Psychosocial (WDL) WDL   Neurological   Neuro (WDL) WDL   Level of Consciousness 0   Orientation Level Oriented X4   Cognition Appropriate judgement; Appropriate safety awareness; Appropriate attention/concentration; Appropriate for developmental age   Speech Clear   R Foot Dorsiflexion Moderate   L Foot Dorsiflexion Moderate   R Foot Plantar Flexion Moderate   L Foot Plantar Flexion Moderate   RLE Motor Response Normal extension;Normal flexion; Responds to command   RLE Sensation  Full sensation   LLE Motor Response Normal extension;Normal flexion; Responds to command  (Immobilizer on all time)   LLE Sensation  Full sensation   [place] Coma Scale   Eye Opening 4   Best Verbal Response 5   Best Motor Response 6   [place] Coma Scale Score 15   HEENT (Head, Ears, Eyes, Nose, & Throat)   HEENT (WDL) X   Right Eye Glasses   Left Eye Glasses   Respiratory   Respiratory (WDL) X   Respiratory Pattern Regular   Respiratory Depth Normal   Respiratory Quality/Effort Unlabored   Chest Assessment Chest expansion symmetrical   L Breath Sounds Diminished   R Breath Sounds Diminished   Breath Sounds   Right Upper Lobe Rhonchi   Right Middle Lobe Rhonchi   Right Lower Lobe Rhonchi   Left Upper Lobe Diminished   Left Lower Lobe Diminished   Cough/Sputum   Sputum How Obtained Cough on request   Cough Non-productive   Sputum Amount None   Cardiac   Cardiac (WDL) WDL   Cardiac Rhythm Atrial fib   Gastrointestinal   Abdominal (WDL) WDL   Genitourinary   Genitourinary (WDL) X  (External cath in place)   Suprapubic Tenderness No   Dysuria (Pain/Burning w/Urination) No   Peripheral Vascular   Peripheral Vascular (WDL) X   Edema Right upper extremity; Left upper extremity; Left lower extremity   RUE Edema Non-pitting   LUE Edema Non-pitting   RLE Edema +2   LLE Edema Non-pitting   RLE Neurovascular Assessment   Capillary Refill Less than/Equal to 3 seconds   Color Appropriate for Ethnicity   Temperature Warm   R Pedal Pulse +1   R Calf Tenderness  Negative   LLE Neurovascular Assessment   Capillary Refill Less than/Equal to 3 seconds   Color Appropriate for Ethnicity   Temperature Warm   L Pedal Pulse +1   L Calf Tenderness Negative   Skin Integumentary    Skin Integumentary (WDL) X   Skin Condition/Temp Warm   Skin Integrity Incision (see LDA)   Location lft knee   Skin Integrity Site 2   Skin Integrity Location 2 Abrasion   Location 2 rt toes   Musculoskeletal   Musculoskeletal (WDL) X   RL Extremity Weakness   LL Extremity Surgery;Weakness   Incision 07/14/22 Knee Anterior; Left   Date First Assessed/Time First Assessed: 07/14/22 1151   Present on Hospital Admission: No  Location: Knee  Incision Location Orientation: Anterior; Left   Dressing Status Clean;Dry; Intact   Dressing/Treatment ABD pad; Ace wrap   Closure Sutures   Incision Assessment Other (Comment)  (Dressing in place)   Drainage Amount None

## 2023-03-13 NOTE — PROGRESS NOTES
CLINICAL PHARMACY NOTE: MEDS TO BEDS    Total # of Prescriptions Filled: 1   The following medications were delivered to the patient:  Linezolid      Additional Documentation:  Wife picked up

## 2023-03-14 ENCOUNTER — CLINICAL DOCUMENTATION (OUTPATIENT)
Dept: INFECTIOUS DISEASES | Age: 83
End: 2023-03-14

## 2023-03-14 DIAGNOSIS — T84.54XA INFECTION ASSOCIATED WITH INTERNAL LEFT KNEE PROSTHESIS, INITIAL ENCOUNTER (HCC): Primary | ICD-10-CM

## 2023-03-14 LAB
BACTERIA SNV CULT: NORMAL
BACTERIA SPEC ANAEROBE CULT: NORMAL
GRAM STN SPEC: NORMAL

## 2023-03-14 NOTE — PROGRESS NOTES
Dr. Harrel Schwab has placed a referral order for pharmacist to manage Outpatient Parental Antimicrobial Therapy (OPAT) pursuant the ID Collaborative Practice Agreement. Patient and/or caregiver has verbally consented to have drug therapy managed by a pharmacist. The benefits and risks of complex antimicrobial therapy including drug-specific adverse reactions and necessary follow-up were discussed with patient and/or caregiver and they are amenable to OPAT and pharmacist management. Pertinent PMH and HPI:  PMH CAD, htn, T2DM, obesity, TKR s/p revision on 7/14/22, AF, hld    Presents as direct admit for pain x 4 months    Pertinent Objective Data: Wt Readings from Last 1 Encounters:   03/09/23 270 lb (122.5 kg)      BMI Readings from Last 1 Encounters:   03/09/23 36.62 kg/m²      Serum creatinine: 1.3 mg/dL 03/13/23 0448  Estimated creatinine clearance: 57 mL/min    Lab Results   Component Value Date    CRP 12.5 (H) 03/09/2023       Lab Results   Component Value Date    SEDRATE 28 (H) 03/09/2023       Lab Results   Component Value Date    CKTOTAL 625 (H) 03/13/2023   Unclear why baseline CK is elevated- unable to tolerate dapto    Imaging:   3/9 XRAY:  Previous total left knee replacement which is unchanged in position with bone   graft material seen scattered along the distal femur and proximal tibia. There are extensive postop changes in the soft tissues anteriorly and   extending into the suprapatellar bursa which is more prominent.      Micro:   3/9 cell count:   Component Ref Range & Units 3/9/23 1505   Cell Count Fluid Type  Knee   Left    Color, Fluid  Pink    Appearance, Fluid  Cloudy    Clot Eval.  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 None   Any follow up in ID clinic? F/u with Carolynn 4/26   OPAT Access/Additional LDA R single lumen PICC   Disposition  [facility]     Lab and medication orders have been placed. Clinical Pharmacist will review patient weekly or as needed and make recommendations regarding the above therapy plan.      Thank you,  Anna Stratton, PharmD, [facility] [place], [place] Infectious Disease  Office Phone: [phone] (also available on [employer])  Fax: [phone]    For Pharmacy Admin Tracking Only    Program: Medical Group  CPA in place: Yes  Intervention Detail: Lab(s) Ordered  Time Spent (min): 30

## 2023-03-15 LAB
BACTERIA SNV CULT: NORMAL
BACTERIA SPEC ANAEROBE CULT: NORMAL
GRAM STN SPEC: NORMAL

## 2023-03-15 NOTE — PROGRESS NOTES
Alex Parker, [facility]  [facility] [place] Infectious Disease Support Staff  Please see when pt is dosing his vanc. Would like to get VT Thursday if possible       Spoke with Gracie at [facility] and advised of orders and she verbalizes understanding.  Spoke with patient daughter Jenny Jackson who states dosing is at 11:20-noon and they understand to hold dose until vanc trough drawn tomorrow

## 2023-03-16 LAB — VANCOMYCIN TROUGH: 11.8 MCG/ML (ref 10–20)

## 2023-03-17 ENCOUNTER — CLINICAL DOCUMENTATION (OUTPATIENT)
Dept: INFECTIOUS DISEASES | Age: 83
End: 2023-03-17

## 2023-03-17 ENCOUNTER — TELEPHONE (OUTPATIENT)
Dept: INFECTIOUS DISEASES | Age: 83
End: 2023-03-17

## 2023-03-17 NOTE — PROGRESS NOTES
Pt doses around noon each day. Vanc random drawn @ 0900 prior to 4th dose= 11.8. Since not a true trough, would except true trough to be a little lower. However, given that patient has not yet reached steady state (elderly with baseline SCr ~1.5) cannot reliably say what true trough is. Since pt remains on linezolid as well, will wait until true trough on Monday for any dose adjustments.      Sasha Esposito, PharmD, [facility] [place], [place] Infectious Disease  Phone: [phone] (also available on [employer])  Fax: [phone]    For Pharmacy [id number]    Program: Medical Group  CPA in place: Yes  Time Spent (min): 10

## 2023-03-17 NOTE — TELEPHONE ENCOUNTER
Spoke with Gracie at [facility] requesting true trough drawn just prior to noon dose on Monday with routine labs.  She will notify nurse

## 2023-03-20 LAB
ALBUMIN SERPL-MCNC: 3.5 G/DL (ref 3.5–5.7)
ALP BLD-CCNC: 80 IU/L (ref 35–135)
ALT SERPL-CCNC: 29 IU/L (ref 10–60)
ANION GAP SERPL CALCULATED.3IONS-SCNC: 9 MMOL/L (ref 6–18)
AST SERPL-CCNC: 37 IU/L (ref 10–40)
BACTERIA SNV CULT: NORMAL
BACTERIA SNV CULT: NORMAL
BACTERIA SPEC ANAEROBE CULT: NORMAL
BACTERIA SPEC ANAEROBE CULT: NORMAL
BASOPHILS ABSOLUTE: 0 THOU/MCL (ref 0–0.2)
BASOPHILS ABSOLUTE: 1 %
BILIRUB SERPL-MCNC: 0.5 MG/DL (ref 0–1.2)
BUN BLDV-MCNC: 53 MG/DL (ref 8–26)
C-REACTIVE PROTEIN WIDE RANGE: 6 MG/L
CALCIUM SERPL-MCNC: 8.6 MG/DL (ref 8.5–10.4)
CHLORIDE BLD-SCNC: 102 MEQ/L (ref 98–111)
CO2: 28 MMOL/L (ref 21–31)
CREAT SERPL-MCNC: 2 MG/DL (ref 0.7–1.3)
EGFR (CKD-EPI): 32 ML/MIN/1.73 M2
EOSINOPHILS ABSOLUTE: 0 THOU/MCL (ref 0.03–0.45)
EOSINOPHILS RELATIVE PERCENT: 1 %
FUNGUS SPEC CULT: NORMAL
FUNGUS SPEC CULT: NORMAL
GLUCOSE BLD-MCNC: 263 MG/DL (ref 70–99)
GRAM STN SPEC: NORMAL
GRAM STN SPEC: NORMAL
HCT VFR BLD CALC: 22.8 % (ref 40–50)
HEMOGLOBIN: 7.8 G/DL (ref 13.5–16.5)
LOEFFLER MB STN SPEC: NORMAL
LOEFFLER MB STN SPEC: NORMAL
LYMPHOCYTES ABSOLUTE: 1.2 THOU/MCL (ref 1–4)
LYMPHOCYTES RELATIVE PERCENT: 31 %
MCH RBC QN AUTO: 35.1 PG (ref 27–33)
MCHC RBC AUTO-ENTMCNC: 34.3 G/DL (ref 32–36)
MCV RBC AUTO: 102.4 FL (ref 82–97)
MONOCYTES # BLD: 3 %
MONOCYTES ABSOLUTE: 0.1 THOU/MCL (ref 0.2–0.9)
NEUTROPHILS ABSOLUTE: 2.4 THOU/MCL (ref 1.8–7.7)
PDW BLD-RTO: 19.3 % (ref 12.3–17)
PLATELET # BLD: 106 THOU/MCL (ref 140–375)
PMV BLD AUTO: 8.8 FL (ref 7.4–11.5)
POTASSIUM SERPL-SCNC: 4.6 MEQ/L (ref 3.6–5.1)
RBC # BLD: 2.23 MIL/MCL (ref 4.4–5.8)
SEDIMENTATION RATE, ERYTHROCYTE: 21 MM/HR
SEG NEUTROPHILS: 64 %
SODIUM BLD-SCNC: 139 MEQ/L (ref 135–145)
TOTAL PROTEIN: 5.4 G/DL (ref 6–8)
VANCOMYCIN TROUGH: 12 MCG/ML (ref 10–20)
WBC # BLD: 3.8 THOU/MCL (ref 3.6–10.5)

## 2023-03-21 ENCOUNTER — TELEPHONE (OUTPATIENT)
Dept: INFECTIOUS DISEASES | Age: 83
End: 2023-03-21

## 2023-03-21 LAB
ACID FAST STN SPEC QL: NORMAL
ACID FAST STN SPEC QL: NORMAL
MYCOBACTERIUM SPEC CULT: NORMAL
MYCOBACTERIUM SPEC CULT: NORMAL

## 2023-03-21 NOTE — TELEPHONE ENCOUNTER
Spoke with Gracie at [facility] and advised of need for repeat labs, she states RN will go out Wednesday.  LMOM to advise patient of labs, requested call back

## 2023-03-21 NOTE — TELEPHONE ENCOUNTER
----- Message from Litzy Sandoval, [facility] sent at 3/20/2023  3:58 PM EDT -----  Note slightly elevated SCr from baseline. Baseline = 1.5  Today = 2.0    VT stable and appropriate. True trough at steady state= 12.0    Pt reports diarrhea- approx. 2x/day. Wife is going to give probiotic 2x/day (was doing 1x/day) and ensure pt is hydrated. Repeat VT and SCr later this week when home health RN is able to. Wednesday? Thursday?     Mariana Durán, PharmD, [address] [place], [place] Infectious Disease  Phone: [phone] (also available on [employer])  Fax: [phone]    For Pharmacy [address] [place] [address] [address]    Program: Medical Group  CPA in place: Yes  Time Spent (min): 10

## 2023-03-21 NOTE — TELEPHONE ENCOUNTER
Per wife patient was admitted last night at [facility] due to increased weakness. Spoke with patients floor nurse Alka Brown who states patient is on IV Vancomycin and oral linezolid, at this time ID is not consulted but she thinks they will be. Office contact information provided for f/u on DC plans.  Wife agrees to notify office of DC from hospital

## 2023-03-27 ENCOUNTER — TELEPHONE (OUTPATIENT)
Dept: INFECTIOUS DISEASES | Age: 83
End: 2023-03-27

## 2023-03-27 LAB
BACTERIA SNV CULT: NORMAL
BACTERIA SNV CULT: NORMAL
BACTERIA SPEC ANAEROBE CULT: NORMAL
BACTERIA SPEC ANAEROBE CULT: NORMAL
FUNGUS SPEC CULT: NORMAL
FUNGUS SPEC CULT: NORMAL
GRAM STN SPEC: NORMAL
GRAM STN SPEC: NORMAL
LOEFFLER MB STN SPEC: NORMAL
LOEFFLER MB STN SPEC: NORMAL

## 2023-03-27 NOTE — TELEPHONE ENCOUNTER
Received call from [facility] NP with [facility] ID, [phone], she states patient will be leaving today for [facility]. CAYLA orders Daptomycin 600mg daily through 4/28, CBC w/diff, CMP, CRP and CK twice weekly. She states CK has been normal and monitored daily this hospital stay.  Will f/u with [facility] tomorrow to verify OPAT orders and f/u appt 4/26

## 2023-03-28 ENCOUNTER — TELEPHONE (OUTPATIENT)
Dept: INFECTIOUS DISEASES | Age: 83
End: 2023-03-28

## 2023-03-28 DIAGNOSIS — T84.54XA INFECTION ASSOCIATED WITH INTERNAL LEFT KNEE PROSTHESIS, INITIAL ENCOUNTER (HCC): Primary | ICD-10-CM

## 2023-03-28 NOTE — TELEPHONE ENCOUNTER
Spoke with patients nurse Daron Jeronimo at [facility]. OPAT orders verified.  Office contact information provided

## 2023-03-30 LAB
ALBUMIN SERPL-MCNC: 3.5 G/DL
ALP BLD-CCNC: 84 U/L
ALT SERPL-CCNC: 22 U/L
ANION GAP SERPL CALCULATED.3IONS-SCNC: ABNORMAL MMOL/L
AST SERPL-CCNC: 27 U/L
BASOPHILS ABSOLUTE: ABNORMAL
BASOPHILS RELATIVE PERCENT: 0.6 %
BILIRUB SERPL-MCNC: 0.7 MG/DL (ref 0.1–1.4)
BUN BLDV-MCNC: 34 MG/DL
C-REACTIVE PROTEIN: 9.5
CALCIUM SERPL-MCNC: 8.4 MG/DL
CHLORIDE BLD-SCNC: 100 MMOL/L
CO2: 35 MMOL/L
CREAT SERPL-MCNC: 1.4 MG/DL
EGFR: 48
EOSINOPHILS ABSOLUTE: ABNORMAL
EOSINOPHILS RELATIVE PERCENT: 0.9 %
GLUCOSE BLD-MCNC: 216 MG/DL
HCT VFR BLD CALC: 21 % (ref 41–53)
HEMOGLOBIN: 6.9 G/DL (ref 13.5–17.5)
LYMPHOCYTES ABSOLUTE: 0.9 /ΜL
LYMPHOCYTES RELATIVE PERCENT: 25.3 %
MCH RBC QN AUTO: 34 PG
MCHC RBC AUTO-ENTMCNC: 32.9 G/DL
MCV RBC AUTO: 103.2 FL
MONOCYTES ABSOLUTE: 0.2 /ΜL
MONOCYTES RELATIVE PERCENT: 5 %
NEUTROPHILS ABSOLUTE: 2.5 /ΜL
NEUTROPHILS RELATIVE PERCENT: 68.2 %
PDW BLD-RTO: 20 %
PLATELET # BLD: 149 K/ΜL
PMV BLD AUTO: 8.6 FL
POTASSIUM SERPL-SCNC: 4.1 MMOL/L
RBC # BLD: 2.04 10^6/ΜL
SEDIMENTATION RATE, ERYTHROCYTE: 12
SODIUM BLD-SCNC: 141 MMOL/L
TOTAL CK: 92 U/L
TOTAL PROTEIN: 5.7
WBC # BLD: 3.6 10^3/ML

## 2023-03-31 ENCOUNTER — TELEPHONE (OUTPATIENT)
Dept: INFECTIOUS DISEASES | Age: 83
End: 2023-03-31

## 2023-03-31 DIAGNOSIS — T84.54XA INFECTION ASSOCIATED WITH INTERNAL LEFT KNEE PROSTHESIS, INITIAL ENCOUNTER (HCC): ICD-10-CM

## 2023-03-31 NOTE — TELEPHONE ENCOUNTER
Received weekly lab results, spoke with  at [facility] to insure critical low HGB/HCT addressed by house MD. She states nurse is unavailable, left message with office contact request call back

## 2023-03-31 NOTE — TELEPHONE ENCOUNTER
Per Yulia yates nurse at [facility], house MD is aware of low H/H and will continue to monitor on Monday's weekly labs.  Patient is scheduled for Monday and Thursday draw

## 2023-04-03 LAB
ALBUMIN SERPL-MCNC: 3.3 G/DL
ALP BLD-CCNC: 75 U/L
ALT SERPL-CCNC: 16 U/L
ANION GAP SERPL CALCULATED.3IONS-SCNC: ABNORMAL MMOL/L
AST SERPL-CCNC: 20 U/L
BACTERIA SNV CULT: NORMAL
BACTERIA SPEC ANAEROBE CULT: NORMAL
BASOPHILS ABSOLUTE: ABNORMAL
BASOPHILS RELATIVE PERCENT: 0.9 %
BILIRUB SERPL-MCNC: 0.5 MG/DL (ref 0.1–1.4)
BUN BLDV-MCNC: 22 MG/DL
C-REACTIVE PROTEIN: 3.6
CALCIUM SERPL-MCNC: 8.2 MG/DL
CHLORIDE BLD-SCNC: 104 MMOL/L
CO2: 35 MMOL/L
CREAT SERPL-MCNC: 1.4 MG/DL
EGFR: ABNORMAL
EOSINOPHILS ABSOLUTE: 0.1 /ΜL
EOSINOPHILS RELATIVE PERCENT: 1.9 %
FUNGUS SPEC CULT: NORMAL
FUNGUS SPEC CULT: NORMAL
GLUCOSE BLD-MCNC: 103 MG/DL
GRAM STN SPEC: NORMAL
HCT VFR BLD CALC: 21.3 % (ref 41–53)
HEMOGLOBIN: 7 G/DL (ref 13.5–17.5)
LOEFFLER MB STN SPEC: NORMAL
LOEFFLER MB STN SPEC: NORMAL
LYMPHOCYTES ABSOLUTE: 1.5 /ΜL
LYMPHOCYTES RELATIVE PERCENT: 32.1 %
MCH RBC QN AUTO: 33.8 PG
MCHC RBC AUTO-ENTMCNC: 32.8 G/DL
MCV RBC AUTO: 103.1 FL
MONOCYTES ABSOLUTE: 0.3 /ΜL
MONOCYTES RELATIVE PERCENT: 5.4 %
NEUTROPHILS ABSOLUTE: 2.8 /ΜL
NEUTROPHILS RELATIVE PERCENT: 59.7 %
PDW BLD-RTO: 19.7 %
PLATELET # BLD: 216 K/ΜL
PMV BLD AUTO: 8.3 FL
POTASSIUM SERPL-SCNC: 3.8 MMOL/L
RBC # BLD: 2.07 10^6/ΜL
SEDIMENTATION RATE, ERYTHROCYTE: 3
SODIUM BLD-SCNC: 144 MMOL/L
TOTAL CK: 75 U/L
TOTAL PROTEIN: 5.3
WBC # BLD: 4.7 10^3/ML

## 2023-04-04 DIAGNOSIS — T84.54XA INFECTION ASSOCIATED WITH INTERNAL LEFT KNEE PROSTHESIS, INITIAL ENCOUNTER (HCC): ICD-10-CM

## 2023-04-04 NOTE — RESULT ENCOUNTER NOTE
House MD at [facility] aware of H/H and is managing. OPAT labs WNL.     Onur Meier, PharmD, [address] [facility], [place] Infectious Disease  Phone: [phone] (also available on [email or web address])  Fax: [phone]    For Pharmacy [address]    Program: Medical Group  CPA in place: Yes  Time Spent (min): 5

## 2023-04-12 PROBLEM — Z01.818 PREOP TESTING: Status: RESOLVED | Noted: 2023-03-09 | Resolved: 2023-04-12

## 2023-04-17 LAB
ALBUMIN SERPL-MCNC: 3.2 G/DL
ALP BLD-CCNC: 77 U/L
ALT SERPL-CCNC: 12 U/L
ANION GAP SERPL CALCULATED.3IONS-SCNC: ABNORMAL MMOL/L
AST SERPL-CCNC: 12 U/L
BASOPHILS ABSOLUTE: ABNORMAL
BASOPHILS RELATIVE PERCENT: 0.7 %
BILIRUB SERPL-MCNC: 0.7 MG/DL (ref 0.1–1.4)
BUN BLDV-MCNC: 25 MG/DL
C-REACTIVE PROTEIN: 85.7
CALCIUM SERPL-MCNC: 8.4 MG/DL
CHLORIDE BLD-SCNC: 101 MMOL/L
CO2: 34 MMOL/L
CREAT SERPL-MCNC: 1.3 MG/DL
EGFR: 53
EOSINOPHILS ABSOLUTE: 0.1 /ΜL
EOSINOPHILS RELATIVE PERCENT: 1.5 %
GLUCOSE BLD-MCNC: 238 MG/DL
HCT VFR BLD CALC: 29.6 % (ref 41–53)
HEMOGLOBIN: 9.6 G/DL (ref 13.5–17.5)
LYMPHOCYTES ABSOLUTE: 1.1 /ΜL
LYMPHOCYTES RELATIVE PERCENT: 24.3 %
MCH RBC QN AUTO: 34 PG
MCHC RBC AUTO-ENTMCNC: 32.5 G/DL
MCV RBC AUTO: 104.6 FL
MONOCYTES ABSOLUTE: 0.2 /ΜL
MONOCYTES RELATIVE PERCENT: 5.1 %
NEUTROPHILS ABSOLUTE: 3.1 /ΜL
NEUTROPHILS RELATIVE PERCENT: 68.4 %
PDW BLD-RTO: 18.8 %
PLATELET # BLD: 116 K/ΜL
PMV BLD AUTO: 9 FL
POTASSIUM SERPL-SCNC: 3.6 MMOL/L
RBC # BLD: 2.83 10^6/ΜL
SEDIMENTATION RATE, ERYTHROCYTE: 13
SODIUM BLD-SCNC: 143 MMOL/L
TOTAL CK: 40 U/L
TOTAL PROTEIN: 5.6
WBC # BLD: 4.6 10^3/ML

## 2023-04-19 ENCOUNTER — TELEPHONE (OUTPATIENT)
Dept: INFECTIOUS DISEASES | Age: 83
End: 2023-04-19

## 2023-04-19 DIAGNOSIS — T84.54XA INFECTION ASSOCIATED WITH INTERNAL LEFT KNEE PROSTHESIS, INITIAL ENCOUNTER (HCC): ICD-10-CM

## 2023-04-19 NOTE — RESULT ENCOUNTER NOTE
Inflammatory markers are up a bit this week. Called wife and patient-    Patient is \"doing great. \" No pain in knee; is able to ambulate; no drainage on bandage. Some residual swelling and fluid retention in LE to which he is getting torsemide. Working with PT/OT/speech and has \"been more aggressive\" with exercises lately which may explain additional inflammation/labs. Cough is gone; breathing better; getting O2 at night. Sees pulm Tuesday. Sees ortho on Monday. Sees Carolynn on Wed. Pt is anxious to be done with PT and abx so that he can return home.     Abhay Romo, PharmD, [address] [place], [place] Infectious Disease  Phone: [phone] (also available on [employer])  Fax: [phone]    For Pharmacy [address]    Program: Medical Group  CPA in place: Yes  Time Spent (min): 20

## 2023-04-20 DIAGNOSIS — T84.54XA INFECTION ASSOCIATED WITH INTERNAL LEFT KNEE PROSTHESIS, INITIAL ENCOUNTER (HCC): ICD-10-CM

## 2023-04-20 LAB
ALBUMIN SERPL-MCNC: 3.1 G/DL
ALP BLD-CCNC: 74 U/L
ALT SERPL-CCNC: 13 U/L
ANION GAP SERPL CALCULATED.3IONS-SCNC: ABNORMAL MMOL/L
AST SERPL-CCNC: 16 U/L
BASOPHILS ABSOLUTE: 0.1 /ΜL
BASOPHILS RELATIVE PERCENT: 1.3 %
BILIRUB SERPL-MCNC: 0.5 MG/DL (ref 0.1–1.4)
BUN BLDV-MCNC: 24 MG/DL
C-REACTIVE PROTEIN: 40.9
CALCIUM SERPL-MCNC: 8.1 MG/DL
CHLORIDE BLD-SCNC: 102 MMOL/L
CO2: 33 MMOL/L
CREAT SERPL-MCNC: 1.2 MG/DL
EGFR: 58
EOSINOPHILS ABSOLUTE: ABNORMAL
EOSINOPHILS RELATIVE PERCENT: 1.2 %
GLUCOSE BLD-MCNC: 178 MG/DL
HCT VFR BLD CALC: 30.5 % (ref 41–53)
HEMOGLOBIN: 9.9 G/DL (ref 13.5–17.5)
LYMPHOCYTES ABSOLUTE: 1.5 /ΜL
LYMPHOCYTES RELATIVE PERCENT: 38.1 %
MCH RBC QN AUTO: 33.3 PG
MCHC RBC AUTO-ENTMCNC: 32.4 G/DL
MCV RBC AUTO: 102.8 FL
MONOCYTES ABSOLUTE: 0.3 /ΜL
MONOCYTES RELATIVE PERCENT: 7.1 %
NEUTROPHILS ABSOLUTE: 2 /ΜL
NEUTROPHILS RELATIVE PERCENT: 52.3 %
PDW BLD-RTO: 18.2 %
PLATELET # BLD: 144 K/ΜL
PMV BLD AUTO: 8.4 FL
POTASSIUM SERPL-SCNC: 3.4 MMOL/L
RBC # BLD: 2.97 10^6/ΜL
SEDIMENTATION RATE, ERYTHROCYTE: 14
SODIUM BLD-SCNC: 142 MMOL/L
TOTAL CK: 37 U/L
TOTAL PROTEIN: 5.4
WBC # BLD: 3.9 10^3/ML

## 2023-04-24 LAB
ALBUMIN SERPL-MCNC: 3.4 G/DL
ALP BLD-CCNC: 82 U/L
ALT SERPL-CCNC: 13 U/L
ANION GAP SERPL CALCULATED.3IONS-SCNC: ABNORMAL MMOL/L
AST SERPL-CCNC: 15 U/L
BASOPHILS ABSOLUTE: ABNORMAL
BASOPHILS RELATIVE PERCENT: 0.6 %
BILIRUB SERPL-MCNC: 0.5 MG/DL (ref 0.1–1.4)
BUN BLDV-MCNC: 18 MG/DL
C-REACTIVE PROTEIN: 13.4
CALCIUM SERPL-MCNC: 8.4 MG/DL
CHLORIDE BLD-SCNC: 98 MMOL/L
CO2: 34 MMOL/L
CREAT SERPL-MCNC: 1.3 MG/DL
EGFR: 53
EOSINOPHILS ABSOLUTE: ABNORMAL
EOSINOPHILS RELATIVE PERCENT: 1.2 %
GLUCOSE BLD-MCNC: 200 MG/DL
HCT VFR BLD CALC: 33.7 % (ref 41–53)
HEMOGLOBIN: 10.8 G/DL (ref 13.5–17.5)
LYMPHOCYTES ABSOLUTE: 1.2 /ΜL
LYMPHOCYTES RELATIVE PERCENT: 30.6 %
MCH RBC QN AUTO: 32.7 PG
MCHC RBC AUTO-ENTMCNC: 32.1 G/DL
MCV RBC AUTO: 101.9 FL
MONOCYTES ABSOLUTE: 0.2 /ΜL
MONOCYTES RELATIVE PERCENT: 4.4 %
NEUTROPHILS ABSOLUTE: 2.4 /ΜL
NEUTROPHILS RELATIVE PERCENT: 63.2 %
PDW BLD-RTO: 18.7 %
PLATELET # BLD: 207 K/ΜL
PMV BLD AUTO: 8.3 FL
POTASSIUM SERPL-SCNC: 3.7 MMOL/L
RBC # BLD: 3.31 10^6/ΜL
SEDIMENTATION RATE, ERYTHROCYTE: 11
SODIUM BLD-SCNC: 141 MMOL/L
TOTAL CK: 51 U/L
TOTAL PROTEIN: 5.8
WBC # BLD: 3.8 10^3/ML

## 2023-04-26 ENCOUNTER — TELEMEDICINE (OUTPATIENT)
Dept: INFECTIOUS DISEASES | Age: 83
End: 2023-04-26
Payer: MEDICARE

## 2023-04-26 ENCOUNTER — TELEPHONE (OUTPATIENT)
Dept: INFECTIOUS DISEASES | Age: 83
End: 2023-04-26

## 2023-04-26 DIAGNOSIS — E78.2 MIXED HYPERLIPIDEMIA: ICD-10-CM

## 2023-04-26 DIAGNOSIS — T84.54XA INFECTION ASSOCIATED WITH INTERNAL LEFT KNEE PROSTHESIS, INITIAL ENCOUNTER (HCC): ICD-10-CM

## 2023-04-26 DIAGNOSIS — I25.83 CORONARY ARTERY DISEASE DUE TO LIPID RICH PLAQUE: ICD-10-CM

## 2023-04-26 DIAGNOSIS — T84.54XA INFECTION ASSOCIATED WITH INTERNAL LEFT KNEE PROSTHESIS, INITIAL ENCOUNTER (HCC): Primary | ICD-10-CM

## 2023-04-26 DIAGNOSIS — Z86.79 HISTORY OF ATRIAL FIBRILLATION: ICD-10-CM

## 2023-04-26 DIAGNOSIS — I25.10 CORONARY ARTERY DISEASE DUE TO LIPID RICH PLAQUE: ICD-10-CM

## 2023-04-26 DIAGNOSIS — E66.09 CLASS 2 OBESITY DUE TO EXCESS CALORIES WITHOUT SERIOUS COMORBIDITY WITH BODY MASS INDEX (BMI) OF 36.0 TO 36.9 IN ADULT: ICD-10-CM

## 2023-04-26 DIAGNOSIS — E11.69 DIABETES MELLITUS TYPE 2 IN OBESE (HCC): ICD-10-CM

## 2023-04-26 DIAGNOSIS — Z79.2 RECEIVING INTRAVENOUS ANTIBIOTIC TREATMENT AS OUTPATIENT: ICD-10-CM

## 2023-04-26 DIAGNOSIS — E66.9 DIABETES MELLITUS TYPE 2 IN OBESE (HCC): ICD-10-CM

## 2023-04-26 DIAGNOSIS — E03.9 HYPOTHYROIDISM, UNSPECIFIED TYPE: ICD-10-CM

## 2023-04-26 DIAGNOSIS — I10 ESSENTIAL HYPERTENSION: ICD-10-CM

## 2023-04-26 DIAGNOSIS — Z71.89 DIABETES EDUCATION, ENCOUNTER FOR: ICD-10-CM

## 2023-04-26 DIAGNOSIS — Z71.3 WEIGHT LOSS COUNSELING, ENCOUNTER FOR: ICD-10-CM

## 2023-04-26 PROCEDURE — 1123F ACP DISCUSS/DSCN MKR DOCD: CPT | Performed by: INTERNAL MEDICINE

## 2023-04-26 PROCEDURE — 99215 OFFICE O/P EST HI 40 MIN: CPT | Performed by: INTERNAL MEDICINE

## 2023-04-26 PROCEDURE — G8427 DOCREV CUR MEDS BY ELIG CLIN: HCPCS | Performed by: INTERNAL MEDICINE

## 2023-04-26 PROCEDURE — 3051F HG A1C>EQUAL 7.0%<8.0%: CPT | Performed by: INTERNAL MEDICINE

## 2023-04-26 RX ORDER — DOXYCYCLINE HYCLATE 100 MG
100 TABLET ORAL 2 TIMES DAILY
Qty: 60 TABLET | Refills: 2 | Status: SHIPPED | OUTPATIENT
Start: 2023-04-26 | End: 2023-07-25

## 2023-04-26 RX ORDER — FLUTICASONE FUROATE, UMECLIDINIUM BROMIDE AND VILANTEROL TRIFENATATE 200; 62.5; 25 UG/1; UG/1; UG/1
1 POWDER RESPIRATORY (INHALATION) DAILY
COMMUNITY

## 2023-04-26 ASSESSMENT — ENCOUNTER SYMPTOMS
SHORTNESS OF BREATH: 0
DIARRHEA: 0
ABDOMINAL PAIN: 0
EYE REDNESS: 0
WHEEZING: 0
SORE THROAT: 0
SINUS PAIN: 0
SINUS PRESSURE: 0
BACK PAIN: 0
NAUSEA: 0
RHINORRHEA: 0
CONSTIPATION: 0
COUGH: 0
EYE DISCHARGE: 0

## 2023-04-26 NOTE — TELEPHONE ENCOUNTER
LORENA with answering service with Dr. Daron Naidu office requesting synovial fluid cx result sensitivities for Dr. Medardo Edwards.

## 2023-04-26 NOTE — TELEPHONE ENCOUNTER
Per Dr. Luz Marina Fernandes: pull PICC after AM dose tomorrow of IV Vanc. Spoke with nurse Aury Haney at [facility] and advised of orders, faxed orders to [phone] per request. Spoke with patients wife who v/u of PO Doxy, monthly labs x4 and f/u appt made. She requests labs be drawn at PCP office.  Orders faxed to [phone]

## 2023-04-26 NOTE — PROGRESS NOTES
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 pleasant male patient, who had an outpatient visit with me today for follow-up. History was obtained from chart review and the patient. The patient had a virtual clinic visit with me today for above mentioned complaints. The patient has a left knee prosthetic joint infection. She had undergone left knee surgical debridement with hardware retention and polyethylene exchange on 3/9/2023. She has history of index left knee arthroplasty several years ago followed by a revision arthroplasty 16 years later and then developed secondary infection. She has history of recurrent leg cellulitis. Her surgical culture remains negative. The patient had a diagnostic arthrocentesis done before the surgery and orthopedics office. The synovial fluid from that arthrocentesis grew corynebacterium striatum. I placed the patient on oral linezolid for 2 weeks and also started her on long-term IV vancomycin    The patient did a follow-up video visit with me today      Past Medical History:   Past medical and surgical history was reviewed by me during this visit in detail.     Past Medical History:   Diagnosis Date    Arthritis     Atrial fibrillation ([address]     CAD (coronary artery disease)     Diabetes mellitus ([address]     Hyperlipidemia     Hypertension     Thyroid disease        Past Surgical History:    Past Surgical History:   Procedure Laterality Date    BACK SURGERY      cervical   lumbar    CARDIAC SURGERY      bypassx4    CAROTID ENDARTERECTOMY      FOOT SURGERY      left    JOINT REPLACEMENT      KNEE ARTHROTOMY Left 3/9/2023    LEFT KNEE

## 2023-04-26 NOTE — TELEPHONE ENCOUNTER
Spoke with Hue Slater at [facility] requesting weekly labs for MD review at visit today.  She states she will fax now

## 2023-04-27 ENCOUNTER — TELEPHONE (OUTPATIENT)
Dept: INFECTIOUS DISEASES | Age: 83
End: 2023-04-27

## 2023-04-27 NOTE — TELEPHONE ENCOUNTER
Lab results received from [address] and scanned into media, routed to Dr. Sage Howard for review

## 2023-04-28 ENCOUNTER — TELEPHONE (OUTPATIENT)
Dept: INFECTIOUS DISEASES | Age: 83
End: 2023-04-28

## 2023-04-28 NOTE — TELEPHONE ENCOUNTER
Spoke with Ayaz Olson at [facility] [phone], she states patients wife reports monthly labs are needed. She would like them drawn by [facility] rather than PCP as stated prior.  Advised of MD orders and she v/u

## 2023-07-19 ENCOUNTER — TELEPHONE (OUTPATIENT)
Dept: INFECTIOUS DISEASES | Age: 83
End: 2023-07-19

## 2023-07-19 ENCOUNTER — TELEMEDICINE (OUTPATIENT)
Dept: INFECTIOUS DISEASES | Age: 83
End: 2023-07-19
Payer: MEDICARE

## 2023-07-19 DIAGNOSIS — I10 ESSENTIAL HYPERTENSION: ICD-10-CM

## 2023-07-19 DIAGNOSIS — E66.09 CLASS 2 OBESITY DUE TO EXCESS CALORIES WITHOUT SERIOUS COMORBIDITY WITH BODY MASS INDEX (BMI) OF 36.0 TO 36.9 IN ADULT: ICD-10-CM

## 2023-07-19 DIAGNOSIS — E78.2 MIXED HYPERLIPIDEMIA: ICD-10-CM

## 2023-07-19 DIAGNOSIS — Z86.79 HISTORY OF ATRIAL FIBRILLATION: ICD-10-CM

## 2023-07-19 DIAGNOSIS — T84.54XA INFECTION ASSOCIATED WITH INTERNAL LEFT KNEE PROSTHESIS, INITIAL ENCOUNTER (HCC): Primary | ICD-10-CM

## 2023-07-19 DIAGNOSIS — E66.9 DIABETES MELLITUS TYPE 2 IN OBESE (HCC): ICD-10-CM

## 2023-07-19 DIAGNOSIS — I25.83 CORONARY ARTERY DISEASE DUE TO LIPID RICH PLAQUE: ICD-10-CM

## 2023-07-19 DIAGNOSIS — E11.69 DIABETES MELLITUS TYPE 2 IN OBESE (HCC): ICD-10-CM

## 2023-07-19 DIAGNOSIS — E03.9 HYPOTHYROIDISM, UNSPECIFIED TYPE: ICD-10-CM

## 2023-07-19 DIAGNOSIS — I25.10 CORONARY ARTERY DISEASE DUE TO LIPID RICH PLAQUE: ICD-10-CM

## 2023-07-19 DIAGNOSIS — Z71.89 ENCOUNTER FOR MEDICATION COUNSELING: ICD-10-CM

## 2023-07-19 DIAGNOSIS — Z51.81 THERAPEUTIC DRUG MONITORING: ICD-10-CM

## 2023-07-19 PROCEDURE — 3051F HG A1C>EQUAL 7.0%<8.0%: CPT | Performed by: INTERNAL MEDICINE

## 2023-07-19 PROCEDURE — 1123F ACP DISCUSS/DSCN MKR DOCD: CPT | Performed by: INTERNAL MEDICINE

## 2023-07-19 PROCEDURE — G8427 DOCREV CUR MEDS BY ELIG CLIN: HCPCS | Performed by: INTERNAL MEDICINE

## 2023-07-19 PROCEDURE — 99213 OFFICE O/P EST LOW 20 MIN: CPT | Performed by: INTERNAL MEDICINE

## 2023-07-19 RX ORDER — ALBUTEROL SULFATE 2.5 MG/.5ML
2.5 SOLUTION RESPIRATORY (INHALATION) 2 TIMES DAILY PRN
COMMUNITY

## 2023-07-19 ASSESSMENT — ENCOUNTER SYMPTOMS
RHINORRHEA: 0
SORE THROAT: 0
SHORTNESS OF BREATH: 0
NAUSEA: 0
BACK PAIN: 0
EYE REDNESS: 0
SINUS PAIN: 0
ABDOMINAL PAIN: 0
CONSTIPATION: 0
EYE DISCHARGE: 0
SINUS PRESSURE: 0
COUGH: 0
WHEEZING: 0
DIARRHEA: 0

## 2023-07-19 NOTE — TELEPHONE ENCOUNTER
Spoke with Dr. Miguel Putnam, advised of need for labs monthly x4. He states patient has appt tomorrow and he will have labs drawn and discuss further management of doxy in the future as he would prefer Dr. Arpita Quintana continues to manage. He will advise patient's wife to call for future appts after their discussion.

## 2023-07-19 NOTE — PROGRESS NOTES
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 pleasant male patient, who had an outpatient visit with me today for follow-up. History was obtained from chart review and the patient. The patient had a virtual clinic visit with me today for above mentioned complaints. The patient has left knee prosthetic joint infection for which she underwent surgical I&D with hardware retention and polyethylene liner exchange on 3/9/2023. The patient has history of index left knee arthroplasty several years ago and had undergone a revision arthroplasty 16 years later and developed secondary infection. He also has history of recurrent leg cellulitis. Patient is a surgical culture from 3/9/2023 remain negative. He had a diagnostic arthrocentesis done before surgery at orthopedic office which had grown corynebacterium stratum. Patient was placed on long-term IV vancomycin, which we stopped it during his follow-up visit with me on April 26. I had started him on oral doxycycline 100 mg every 12 hours for secondary prophylaxis. The patient did a follow-up video visit with me today      Past Medical History:   Past medical and surgical history was reviewed by me during this visit in detail.     Past Medical History:   Diagnosis Date    Arthritis     Atrial fibrillation ([address])     CAD (coronary artery disease)     Diabetes mellitus ([address])     Hyperlipidemia     Hypertension     Thyroid disease        Past Surgical History:    Past Surgical History:   Procedure Laterality Date    BACK SURGERY      cervical   lumbar    CARDIAC SURGERY      bypassx4    CAROTID ENDARTERECTOMY      FOOT SURGERY

## 2023-07-21 ENCOUNTER — TELEPHONE (OUTPATIENT)
Dept: INFECTIOUS DISEASES | Age: 83
End: 2023-07-21

## 2023-07-21 RX ORDER — DOXYCYCLINE HYCLATE 100 MG
100 TABLET ORAL 2 TIMES DAILY
Qty: 60 TABLET | Refills: 2 | Status: SHIPPED | OUTPATIENT
Start: 2023-07-21 | End: 2023-10-19

## 2023-07-21 NOTE — TELEPHONE ENCOUNTER
Doxycycline refill ordered. Please advise patient to keep on doing monthly labs. Follow-up with me in 3 months in the office.

## 2023-10-18 ENCOUNTER — TELEMEDICINE (OUTPATIENT)
Dept: INFECTIOUS DISEASES | Age: 83
End: 2023-10-18

## 2023-10-18 DIAGNOSIS — Z71.89 DIABETES EDUCATION, ENCOUNTER FOR: ICD-10-CM

## 2023-10-18 DIAGNOSIS — E11.69 DIABETES MELLITUS TYPE 2 IN OBESE (HCC): ICD-10-CM

## 2023-10-18 DIAGNOSIS — Z51.81 THERAPEUTIC DRUG MONITORING: ICD-10-CM

## 2023-10-18 DIAGNOSIS — I25.83 CORONARY ARTERY DISEASE DUE TO LIPID RICH PLAQUE: ICD-10-CM

## 2023-10-18 DIAGNOSIS — E66.9 DIABETES MELLITUS TYPE 2 IN OBESE (HCC): ICD-10-CM

## 2023-10-18 DIAGNOSIS — T84.54XD INFECTION ASSOCIATED WITH INTERNAL LEFT KNEE PROSTHESIS, SUBSEQUENT ENCOUNTER: Primary | ICD-10-CM

## 2023-10-18 DIAGNOSIS — I25.10 CORONARY ARTERY DISEASE DUE TO LIPID RICH PLAQUE: ICD-10-CM

## 2023-10-18 DIAGNOSIS — E66.09 CLASS 2 OBESITY DUE TO EXCESS CALORIES WITHOUT SERIOUS COMORBIDITY WITH BODY MASS INDEX (BMI) OF 36.0 TO 36.9 IN ADULT: ICD-10-CM

## 2023-10-18 DIAGNOSIS — D72.819 LEUKOPENIA, UNSPECIFIED TYPE: ICD-10-CM

## 2023-10-18 DIAGNOSIS — I10 ESSENTIAL HYPERTENSION: ICD-10-CM

## 2023-10-18 DIAGNOSIS — T84.54XA INFECTION ASSOCIATED WITH INTERNAL LEFT KNEE PROSTHESIS, INITIAL ENCOUNTER (HCC): ICD-10-CM

## 2023-10-18 DIAGNOSIS — E03.9 HYPOTHYROIDISM, UNSPECIFIED TYPE: ICD-10-CM

## 2023-10-18 DIAGNOSIS — Z71.89 ENCOUNTER FOR MEDICATION COUNSELING: ICD-10-CM

## 2023-10-18 DIAGNOSIS — E78.2 MIXED HYPERLIPIDEMIA: ICD-10-CM

## 2023-10-18 RX ORDER — DOXYCYCLINE HYCLATE 100 MG
100 TABLET ORAL 2 TIMES DAILY
Qty: 60 TABLET | Refills: 2 | Status: SHIPPED | OUTPATIENT
Start: 2023-10-18 | End: 2024-01-16

## 2023-10-18 ASSESSMENT — ENCOUNTER SYMPTOMS
SINUS PAIN: 0
SORE THROAT: 0
ABDOMINAL PAIN: 0
DIARRHEA: 0
SHORTNESS OF BREATH: 0
SINUS PRESSURE: 0
CONSTIPATION: 0
EYE REDNESS: 0
NAUSEA: 0
COUGH: 0
EYE DISCHARGE: 0
WHEEZING: 0
BACK PAIN: 0
RHINORRHEA: 0

## 2023-10-18 NOTE — PROGRESS NOTES
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 pleasant male patient, who had an outpatient visit with me today for follow-up. History was obtained from chart review and the patient. The patient had a virtual clinic visit with me today for above mentioned complaints. The patient has history of left knee prosthetic joint infection infection for which he underwent a surgical I&D with hardware retention and polyethylene liner exchange surgery on 3/9/2023. The patient has history of index arthroplasty of the left knee several years ago and had a recent arthroplasty 16 years later developed secondary infection after revision arthroplasty. His blood culture from 3/9/2023 was negative but diagnostic arthrocentesis done before surgery at orthopedic office was positive for corynebacterium stratum. I gave him IV vancomycin after surgery until April 26 and then switched him to oral doxycycline 100 mg every 12 hours for secondary prophylaxis. The patient is supposed to stay on doxycycline for secondary prophylaxis indefinitely. The patient did a follow-up video visit with me. His wife was at bedside during the video visit      Past Medical History:   Past medical and surgical history was reviewed by me during this visit in detail.     Past Medical History:   Diagnosis Date    Arthritis     Atrial fibrillation ([address])     CAD (coronary artery disease)     Diabetes mellitus ([address])     Hyperlipidemia     Hypertension     Thyroid disease        Past Surgical History:    Past Surgical History:   Procedure Laterality Date    BACK SURGERY      cervical   lumbar    CARDIAC SURGERY

## 2023-11-24 ENCOUNTER — HOSPITAL ENCOUNTER (EMERGENCY)
Age: 83
Discharge: HOME | End: 2023-11-24
Payer: MEDICARE

## 2023-11-24 ENCOUNTER — APPOINTMENT (OUTPATIENT)
Dept: GENERAL RADIOLOGY | Age: 83
End: 2023-11-24
Payer: MEDICARE

## 2023-11-24 VITALS
SYSTOLIC BLOOD PRESSURE: 161 MMHG | HEART RATE: 72 BPM | OXYGEN SATURATION: 97 % | DIASTOLIC BLOOD PRESSURE: 84 MMHG | TEMPERATURE: 97.7 F | WEIGHT: 265 LBS | HEIGHT: 72 IN | RESPIRATION RATE: 16 BRPM | BODY MASS INDEX: 35.89 KG/M2

## 2023-11-24 DIAGNOSIS — M25.462 EFFUSION OF LEFT KNEE: Primary | ICD-10-CM

## 2023-11-24 LAB
ALBUMIN SERPL-MCNC: 3.3 G/DL (ref 3.4–5)
ALBUMIN/GLOB SERPL: 1 {RATIO} (ref 1.1–2.2)
ALP SERPL-CCNC: 124 U/L (ref 40–129)
ALT SERPL-CCNC: 20 U/L (ref 10–40)
ANION GAP SERPL CALCULATED.3IONS-SCNC: 10 MMOL/L (ref 3–16)
ANISOCYTOSIS BLD QL SMEAR: ABNORMAL
AST SERPL-CCNC: 36 U/L (ref 15–37)
BASOPHILS # BLD: 0 K/UL (ref 0–0.2)
BASOPHILS NFR BLD: 0 %
BILIRUB SERPL-MCNC: 0.6 MG/DL (ref 0–1)
BUN SERPL-MCNC: 35 MG/DL (ref 7–20)
CALCIUM SERPL-MCNC: 8.5 MG/DL (ref 8.3–10.6)
CHLORIDE SERPL-SCNC: 103 MMOL/L (ref 99–110)
CO2 SERPL-SCNC: 27 MMOL/L (ref 21–32)
CREAT SERPL-MCNC: 1.4 MG/DL (ref 0.8–1.3)
CRP SERPL-MCNC: 67.9 MG/L (ref 0–5.1)
DEPRECATED RDW RBC AUTO: 21.9 % (ref 12.4–15.4)
EOSINOPHIL # BLD: 0 K/UL (ref 0–0.6)
EOSINOPHIL NFR BLD: 0 %
ERYTHROCYTE [SEDIMENTATION RATE] IN BLOOD BY WESTERGREN METHOD: 35 MM/HR (ref 0–20)
GFR SERPLBLD CREATININE-BSD FMLA CKD-EPI: 49 ML/MIN/{1.73_M2}
GLUCOSE SERPL-MCNC: 212 MG/DL (ref 70–99)
HCT VFR BLD AUTO: 28.3 % (ref 40.5–52.5)
HGB BLD-MCNC: 8.7 G/DL (ref 13.5–17.5)
LACTATE BLDV-SCNC: 1.7 MMOL/L (ref 0.4–1.9)
LYMPHOCYTES # BLD: 0.9 K/UL (ref 1–5.1)
LYMPHOCYTES NFR BLD: 21 %
MACROCYTES BLD QL SMEAR: ABNORMAL
MCH RBC QN AUTO: 30.2 PG (ref 26–34)
MCHC RBC AUTO-ENTMCNC: 30.7 G/DL (ref 31–36)
MCV RBC AUTO: 98.2 FL (ref 80–100)
MONOCYTES # BLD: 0.1 K/UL (ref 0–1.3)
MONOCYTES NFR BLD: 2 %
NEUTROPHILS # BLD: 3.2 K/UL (ref 1.7–7.7)
NEUTROPHILS NFR BLD: 77 %
PLATELET # BLD AUTO: 182 K/UL (ref 135–450)
PLATELET BLD QL SMEAR: ADEQUATE
PMV BLD AUTO: 7.4 FL (ref 5–10.5)
POTASSIUM SERPL-SCNC: 4.3 MMOL/L (ref 3.5–5.1)
PROCALCITONIN SERPL IA-MCNC: 0.11 NG/ML (ref 0–0.15)
PROT SERPL-MCNC: 6.7 G/DL (ref 6.4–8.2)
RBC # BLD AUTO: 2.88 M/UL (ref 4.2–5.9)
SLIDE REVIEW: ABNORMAL
SODIUM SERPL-SCNC: 140 MMOL/L (ref 136–145)
TARGETS BLD QL SMEAR: ABNORMAL
URATE SERPL-MCNC: 4.9 MG/DL (ref 3.5–7.2)
WBC # BLD AUTO: 4.1 K/UL (ref 4–11)

## 2023-11-24 PROCEDURE — 86140 C-REACTIVE PROTEIN: CPT

## 2023-11-24 PROCEDURE — 99284 EMERGENCY DEPT VISIT MOD MDM: CPT

## 2023-11-24 PROCEDURE — 84550 ASSAY OF BLOOD/URIC ACID: CPT

## 2023-11-24 PROCEDURE — 93971 EXTREMITY STUDY: CPT

## 2023-11-24 PROCEDURE — 80053 COMPREHEN METABOLIC PANEL: CPT

## 2023-11-24 PROCEDURE — 84145 PROCALCITONIN (PCT): CPT

## 2023-11-24 PROCEDURE — 85652 RBC SED RATE AUTOMATED: CPT

## 2023-11-24 PROCEDURE — 83605 ASSAY OF LACTIC ACID: CPT

## 2023-11-24 PROCEDURE — 73562 X-RAY EXAM OF KNEE 3: CPT

## 2023-11-24 PROCEDURE — 85025 COMPLETE CBC W/AUTO DIFF WBC: CPT

## 2023-11-24 ASSESSMENT — ENCOUNTER SYMPTOMS
SHORTNESS OF BREATH: 0
NAUSEA: 0
WHEEZING: 0
COUGH: 0
ABDOMINAL PAIN: 0
DIARRHEA: 0
VOMITING: 0
RHINORRHEA: 0

## 2023-11-24 ASSESSMENT — LIFESTYLE VARIABLES
HOW OFTEN DO YOU HAVE A DRINK CONTAINING ALCOHOL: MONTHLY OR LESS
HOW MANY STANDARD DRINKS CONTAINING ALCOHOL DO YOU HAVE ON A TYPICAL DAY: 1 OR 2

## 2023-11-24 ASSESSMENT — PAIN DESCRIPTION - LOCATION: LOCATION: LEG

## 2023-11-24 ASSESSMENT — PAIN - FUNCTIONAL ASSESSMENT: PAIN_FUNCTIONAL_ASSESSMENT: 0-10

## 2023-11-24 ASSESSMENT — PAIN SCALES - GENERAL: PAINLEVEL_OUTOF10: 5

## 2023-11-24 ASSESSMENT — PAIN DESCRIPTION - ORIENTATION: ORIENTATION: LEFT

## 2023-11-24 NOTE — ED PROVIDER NOTES
Stiven Tobar        Pt Name: Deepa Valle  MRN: [id number]  [address] [place] 1940  Date of evaluation: 11/24/2023  Provider: Mandie Moreno PA-C  PCP: Dany Luna  Note Started: 10:20 AM EST 11/24/23      SAMANTHA. I have evaluated this patient. CHIEF COMPLAINT       Chief Complaint   Patient presents with    Leg Swelling     Left leg swelling x several months, worsening. Advised by Dr. Donahue Mealing office to come to emergency room, hx infection in that leg and taking abx. HISTORY OF PRESENT ILLNESS: 1 or more Elements     History From: patient   Limitations to history : None    Deepa Valle is a 79 y.o. male who presents for evaluation of left proximal leg swelling. Patient states that his knee has been more swollen over about the past month. States that he has been seen for lymphedema, by vascular surgery and infectious disease. He states they told him that it is infected and he recently was on antibiotics without improvement. Told to come to the ED. Denies any significant pain associated with this. No numbness tingling or weakness distally. No chest pain or shortness of breath. He is not anticoagulated. Reports compliance with medications including diuretic therapy. He has no other complaints or concerns at this time. Nursing Notes were all reviewed and agreed with or any disagreements were addressed in the HPI. REVIEW OF SYSTEMS :      Review of Systems   Constitutional:  Negative for appetite change, chills and fever. HENT:  Negative for congestion and rhinorrhea. Respiratory:  Negative for cough, shortness of breath and wheezing. Cardiovascular:  Positive for leg swelling. Negative for chest pain. Gastrointestinal:  Negative for abdominal pain, diarrhea, nausea and vomiting. Genitourinary:  Negative for difficulty urinating, dysuria and hematuria.    Musculoskeletal:  Negative for neck pain

## 2023-12-22 ENCOUNTER — APPOINTMENT (OUTPATIENT)
Dept: ULTRASOUND IMAGING | Age: 83
DRG: 727 | End: 2023-12-22
Payer: MEDICARE

## 2023-12-22 ENCOUNTER — APPOINTMENT (OUTPATIENT)
Dept: GENERAL RADIOLOGY | Age: 83
DRG: 727 | End: 2023-12-22
Payer: MEDICARE

## 2023-12-22 ENCOUNTER — HOSPITAL ENCOUNTER (INPATIENT)
Age: 83
LOS: 2 days | Discharge: HOME HEALTH | DRG: 727 | End: 2023-12-24
Attending: EMERGENCY MEDICINE | Admitting: HOSPITALIST
Payer: MEDICARE

## 2023-12-22 DIAGNOSIS — I50.9 CONGESTIVE HEART FAILURE, UNSPECIFIED HF CHRONICITY, UNSPECIFIED HEART FAILURE TYPE (HCC): ICD-10-CM

## 2023-12-22 DIAGNOSIS — N50.89 SCROTAL SWELLING: Primary | ICD-10-CM

## 2023-12-22 DIAGNOSIS — N50.819 PAIN IN TESTICLE, UNSPECIFIED LATERALITY: ICD-10-CM

## 2023-12-22 PROBLEM — N49.2 CELLULITIS OF SCROTUM: Status: ACTIVE | Noted: 2023-12-22

## 2023-12-22 LAB
ALBUMIN SERPL-MCNC: 3.2 G/DL (ref 3.4–5)
ALBUMIN/GLOB SERPL: 1.1 {RATIO} (ref 1.1–2.2)
ALP SERPL-CCNC: 110 U/L (ref 40–129)
ALT SERPL-CCNC: 18 U/L (ref 10–40)
ANION GAP SERPL CALCULATED.3IONS-SCNC: 4 MMOL/L (ref 3–16)
AST SERPL-CCNC: 33 U/L (ref 15–37)
BACTERIA URNS QL MICRO: NORMAL /HPF
BASOPHILS # BLD: 0 K/UL (ref 0–0.2)
BASOPHILS NFR BLD: 0.3 %
BILIRUB SERPL-MCNC: 0.4 MG/DL (ref 0–1)
BILIRUB UR QL STRIP.AUTO: NEGATIVE
BUN SERPL-MCNC: 35 MG/DL (ref 7–20)
CALCIUM SERPL-MCNC: 8.7 MG/DL (ref 8.3–10.6)
CHLORIDE SERPL-SCNC: 100 MMOL/L (ref 99–110)
CLARITY UR: ABNORMAL
CO2 SERPL-SCNC: 33 MMOL/L (ref 21–32)
COLOR UR: YELLOW
CREAT SERPL-MCNC: 1.3 MG/DL (ref 0.8–1.3)
DEPRECATED RDW RBC AUTO: 24 % (ref 12.4–15.4)
EOSINOPHIL # BLD: 0 K/UL (ref 0–0.6)
EOSINOPHIL NFR BLD: 0.4 %
EPI CELLS #/AREA URNS AUTO: 1 /HPF (ref 0–5)
GFR SERPLBLD CREATININE-BSD FMLA CKD-EPI: 54 ML/MIN/{1.73_M2}
GLUCOSE BLD-MCNC: 219 MG/DL (ref 70–99)
GLUCOSE SERPL-MCNC: 114 MG/DL (ref 70–99)
GLUCOSE UR STRIP.AUTO-MCNC: 500 MG/DL
HCT VFR BLD AUTO: 25.3 % (ref 40.5–52.5)
HGB BLD-MCNC: 8 G/DL (ref 13.5–17.5)
HGB UR QL STRIP.AUTO: NEGATIVE
HYALINE CASTS #/AREA URNS AUTO: 2 /LPF (ref 0–8)
KETONES UR STRIP.AUTO-MCNC: NEGATIVE MG/DL
LEUKOCYTE ESTERASE UR QL STRIP.AUTO: NEGATIVE
LYMPHOCYTES # BLD: 0.9 K/UL (ref 1–5.1)
LYMPHOCYTES NFR BLD: 28.3 %
MCH RBC QN AUTO: 31.4 PG (ref 26–34)
MCHC RBC AUTO-ENTMCNC: 31.7 G/DL (ref 31–36)
MCV RBC AUTO: 99.1 FL (ref 80–100)
MONOCYTES # BLD: 0.2 K/UL (ref 0–1.3)
MONOCYTES NFR BLD: 4.6 %
NEUTROPHILS # BLD: 2.2 K/UL (ref 1.7–7.7)
NEUTROPHILS NFR BLD: 66.4 %
NITRITE UR QL STRIP.AUTO: NEGATIVE
NT-PROBNP SERPL-MCNC: 2698 PG/ML (ref 0–449)
PERFORMED ON: ABNORMAL
PH UR STRIP.AUTO: 5.5 [PH] (ref 5–8)
PLATELET # BLD AUTO: 167 K/UL (ref 135–450)
PMV BLD AUTO: 7.7 FL (ref 5–10.5)
POTASSIUM SERPL-SCNC: 4.2 MMOL/L (ref 3.5–5.1)
PROT SERPL-MCNC: 6.2 G/DL (ref 6.4–8.2)
PROT UR STRIP.AUTO-MCNC: NEGATIVE MG/DL
RBC # BLD AUTO: 2.55 M/UL (ref 4.2–5.9)
RBC CLUMPS #/AREA URNS AUTO: 1 /HPF (ref 0–4)
SODIUM SERPL-SCNC: 137 MMOL/L (ref 136–145)
SP GR UR STRIP.AUTO: 1.01 (ref 1–1.03)
UA COMPLETE W REFLEX CULTURE PNL UR: ABNORMAL
UA DIPSTICK W REFLEX MICRO PNL UR: YES
URN SPEC COLLECT METH UR: ABNORMAL
UROBILINOGEN UR STRIP-ACNC: 1 E.U./DL
WBC # BLD AUTO: 3.3 K/UL (ref 4–11)
WBC #/AREA URNS AUTO: 0 /HPF (ref 0–5)

## 2023-12-22 PROCEDURE — 93975 VASCULAR STUDY: CPT

## 2023-12-22 PROCEDURE — 80053 COMPREHEN METABOLIC PANEL: CPT

## 2023-12-22 PROCEDURE — 6360000002 HC RX W HCPCS: Performed by: HOSPITALIST

## 2023-12-22 PROCEDURE — 2580000003 HC RX 258: Performed by: HOSPITALIST

## 2023-12-22 PROCEDURE — 71045 X-RAY EXAM CHEST 1 VIEW: CPT

## 2023-12-22 PROCEDURE — 85025 COMPLETE CBC W/AUTO DIFF WBC: CPT

## 2023-12-22 PROCEDURE — 1200000000 HC SEMI PRIVATE

## 2023-12-22 PROCEDURE — 6370000000 HC RX 637 (ALT 250 FOR IP): Performed by: HOSPITALIST

## 2023-12-22 PROCEDURE — 83036 HEMOGLOBIN GLYCOSYLATED A1C: CPT

## 2023-12-22 PROCEDURE — 6360000002 HC RX W HCPCS: Performed by: EMERGENCY MEDICINE

## 2023-12-22 PROCEDURE — 81001 URINALYSIS AUTO W/SCOPE: CPT

## 2023-12-22 PROCEDURE — 83880 ASSAY OF NATRIURETIC PEPTIDE: CPT

## 2023-12-22 PROCEDURE — 99285 EMERGENCY DEPT VISIT HI MDM: CPT

## 2023-12-22 PROCEDURE — 6370000000 HC RX 637 (ALT 250 FOR IP): Performed by: EMERGENCY MEDICINE

## 2023-12-22 PROCEDURE — 76870 US EXAM SCROTUM: CPT

## 2023-12-22 RX ORDER — ACETAMINOPHEN 325 MG/1
650 TABLET ORAL EVERY 6 HOURS PRN
Status: DISCONTINUED | OUTPATIENT
Start: 2023-12-22 | End: 2023-12-24 | Stop reason: HOSPADM

## 2023-12-22 RX ORDER — OXYCODONE HYDROCHLORIDE AND ACETAMINOPHEN 5; 325 MG/1; MG/1
1 TABLET ORAL ONCE
Status: COMPLETED | OUTPATIENT
Start: 2023-12-22 | End: 2023-12-22

## 2023-12-22 RX ORDER — POTASSIUM CHLORIDE 20 MEQ/1
40 TABLET, EXTENDED RELEASE ORAL PRN
Status: DISCONTINUED | OUTPATIENT
Start: 2023-12-22 | End: 2023-12-24 | Stop reason: HOSPADM

## 2023-12-22 RX ORDER — MAGNESIUM SULFATE IN WATER 40 MG/ML
2000 INJECTION, SOLUTION INTRAVENOUS PRN
Status: DISCONTINUED | OUTPATIENT
Start: 2023-12-22 | End: 2023-12-24 | Stop reason: HOSPADM

## 2023-12-22 RX ORDER — ACETAMINOPHEN 650 MG/1
650 SUPPOSITORY RECTAL EVERY 6 HOURS PRN
Status: DISCONTINUED | OUTPATIENT
Start: 2023-12-22 | End: 2023-12-24 | Stop reason: HOSPADM

## 2023-12-22 RX ORDER — POLYETHYLENE GLYCOL 3350 17 G/17G
17 POWDER, FOR SOLUTION ORAL DAILY PRN
Status: DISCONTINUED | OUTPATIENT
Start: 2023-12-22 | End: 2023-12-24 | Stop reason: HOSPADM

## 2023-12-22 RX ORDER — FUROSEMIDE 10 MG/ML
40 INJECTION INTRAMUSCULAR; INTRAVENOUS ONCE
Status: COMPLETED | OUTPATIENT
Start: 2023-12-22 | End: 2023-12-22

## 2023-12-22 RX ORDER — GABAPENTIN 400 MG/1
400 CAPSULE ORAL 4 TIMES DAILY
Status: DISCONTINUED | OUTPATIENT
Start: 2023-12-22 | End: 2023-12-24 | Stop reason: HOSPADM

## 2023-12-22 RX ORDER — INSULIN LISPRO 100 [IU]/ML
0-4 INJECTION, SOLUTION INTRAVENOUS; SUBCUTANEOUS NIGHTLY
Status: DISCONTINUED | OUTPATIENT
Start: 2023-12-22 | End: 2023-12-24 | Stop reason: HOSPADM

## 2023-12-22 RX ORDER — ALLOPURINOL 300 MG/1
300 TABLET ORAL DAILY
COMMUNITY

## 2023-12-22 RX ORDER — LEVOTHYROXINE SODIUM 0.15 MG/1
150 TABLET ORAL DAILY
Status: DISCONTINUED | OUTPATIENT
Start: 2023-12-23 | End: 2023-12-22 | Stop reason: SDUPTHER

## 2023-12-22 RX ORDER — FUROSEMIDE 10 MG/ML
40 INJECTION INTRAMUSCULAR; INTRAVENOUS ONCE
Status: CANCELLED | OUTPATIENT
Start: 2023-12-22

## 2023-12-22 RX ORDER — POTASSIUM CHLORIDE 7.45 MG/ML
10 INJECTION INTRAVENOUS PRN
Status: DISCONTINUED | OUTPATIENT
Start: 2023-12-22 | End: 2023-12-24 | Stop reason: HOSPADM

## 2023-12-22 RX ORDER — AMLODIPINE BESYLATE 5 MG/1
2.5 TABLET ORAL NIGHTLY
Status: DISCONTINUED | OUTPATIENT
Start: 2023-12-22 | End: 2023-12-23

## 2023-12-22 RX ORDER — ASPIRIN 81 MG/1
81 TABLET ORAL DAILY
Status: DISCONTINUED | OUTPATIENT
Start: 2023-12-22 | End: 2023-12-22

## 2023-12-22 RX ORDER — ONDANSETRON 4 MG/1
4 TABLET, ORALLY DISINTEGRATING ORAL EVERY 8 HOURS PRN
Status: DISCONTINUED | OUTPATIENT
Start: 2023-12-22 | End: 2023-12-24 | Stop reason: HOSPADM

## 2023-12-22 RX ORDER — OXYCODONE HYDROCHLORIDE AND ACETAMINOPHEN 5; 325 MG/1; MG/1
1 TABLET ORAL EVERY 4 HOURS PRN
Status: DISCONTINUED | OUTPATIENT
Start: 2023-12-22 | End: 2023-12-24 | Stop reason: HOSPADM

## 2023-12-22 RX ORDER — DEXTROSE MONOHYDRATE 100 MG/ML
INJECTION, SOLUTION INTRAVENOUS CONTINUOUS PRN
Status: DISCONTINUED | OUTPATIENT
Start: 2023-12-22 | End: 2023-12-24 | Stop reason: HOSPADM

## 2023-12-22 RX ORDER — INSULIN GLARGINE 100 [IU]/ML
35 INJECTION, SOLUTION SUBCUTANEOUS NIGHTLY
Status: DISCONTINUED | OUTPATIENT
Start: 2023-12-23 | End: 2023-12-24 | Stop reason: HOSPADM

## 2023-12-22 RX ORDER — FUROSEMIDE 10 MG/ML
40 INJECTION INTRAMUSCULAR; INTRAVENOUS 2 TIMES DAILY
Status: DISCONTINUED | OUTPATIENT
Start: 2023-12-22 | End: 2023-12-24 | Stop reason: HOSPADM

## 2023-12-22 RX ORDER — INSULIN LISPRO 100 [IU]/ML
0-4 INJECTION, SOLUTION INTRAVENOUS; SUBCUTANEOUS
Status: DISCONTINUED | OUTPATIENT
Start: 2023-12-23 | End: 2023-12-24 | Stop reason: HOSPADM

## 2023-12-22 RX ORDER — SODIUM CHLORIDE 0.9 % (FLUSH) 0.9 %
5-40 SYRINGE (ML) INJECTION PRN
Status: DISCONTINUED | OUTPATIENT
Start: 2023-12-22 | End: 2023-12-24 | Stop reason: HOSPADM

## 2023-12-22 RX ORDER — INSULIN LISPRO 100 [IU]/ML
5 INJECTION, SOLUTION INTRAVENOUS; SUBCUTANEOUS
Status: DISCONTINUED | OUTPATIENT
Start: 2023-12-22 | End: 2023-12-24 | Stop reason: HOSPADM

## 2023-12-22 RX ORDER — LEVOFLOXACIN 5 MG/ML
750 INJECTION, SOLUTION INTRAVENOUS EVERY 24 HOURS
Status: DISCONTINUED | OUTPATIENT
Start: 2023-12-22 | End: 2023-12-23

## 2023-12-22 RX ORDER — ONDANSETRON 2 MG/ML
4 INJECTION INTRAMUSCULAR; INTRAVENOUS EVERY 6 HOURS PRN
Status: DISCONTINUED | OUTPATIENT
Start: 2023-12-22 | End: 2023-12-24 | Stop reason: HOSPADM

## 2023-12-22 RX ORDER — SODIUM CHLORIDE 9 MG/ML
INJECTION, SOLUTION INTRAVENOUS PRN
Status: DISCONTINUED | OUTPATIENT
Start: 2023-12-22 | End: 2023-12-24 | Stop reason: HOSPADM

## 2023-12-22 RX ORDER — MORPHINE SULFATE 2 MG/ML
2 INJECTION, SOLUTION INTRAMUSCULAR; INTRAVENOUS EVERY 4 HOURS PRN
Status: DISCONTINUED | OUTPATIENT
Start: 2023-12-22 | End: 2023-12-24 | Stop reason: HOSPADM

## 2023-12-22 RX ORDER — CIPROFLOXACIN 2 MG/ML
400 INJECTION, SOLUTION INTRAVENOUS EVERY 12 HOURS
Status: DISCONTINUED | OUTPATIENT
Start: 2023-12-22 | End: 2023-12-22 | Stop reason: ALTCHOICE

## 2023-12-22 RX ORDER — GLUCAGON 1 MG/ML
1 KIT INJECTION PRN
Status: DISCONTINUED | OUTPATIENT
Start: 2023-12-22 | End: 2023-12-22 | Stop reason: RX

## 2023-12-22 RX ORDER — SODIUM CHLORIDE 0.9 % (FLUSH) 0.9 %
5-40 SYRINGE (ML) INJECTION EVERY 12 HOURS SCHEDULED
Status: DISCONTINUED | OUTPATIENT
Start: 2023-12-22 | End: 2023-12-24 | Stop reason: HOSPADM

## 2023-12-22 RX ADMIN — SODIUM CHLORIDE, PRESERVATIVE FREE 10 ML: 5 INJECTION INTRAVENOUS at 23:41

## 2023-12-22 RX ADMIN — LEVOFLOXACIN 750 MG: 5 INJECTION, SOLUTION INTRAVENOUS at 23:42

## 2023-12-22 RX ADMIN — OXYCODONE AND ACETAMINOPHEN 1 TABLET: 5; 325 TABLET ORAL at 17:00

## 2023-12-22 RX ADMIN — APIXABAN 2.5 MG: 5 TABLET, FILM COATED ORAL at 23:44

## 2023-12-22 RX ADMIN — SODIUM CHLORIDE: 9 INJECTION, SOLUTION INTRAVENOUS at 23:42

## 2023-12-22 RX ADMIN — GABAPENTIN 400 MG: 400 CAPSULE ORAL at 23:44

## 2023-12-22 RX ADMIN — FUROSEMIDE 40 MG: 10 INJECTION, SOLUTION INTRAMUSCULAR; INTRAVENOUS at 17:00

## 2023-12-22 NOTE — ED NOTES
ED TO INPATIENT SBAR HANDOFF    Patient Name: Elvis Jernigan   :  1940  79 y.o. MRN:  [id number]  Preferred Name  [facility]  ED Room #:  ED-0022/22  Family/Caregiver Present yes, wife  Restraints no   Sitter no   Sepsis Risk Score Sepsis Risk Score: 3.09    Situation  Code Status: Prior No additional code details. Allergies: Codeine  Weight: Patient Vitals for the past 96 hrs (Last 3 readings):   Weight   23 1121 120.2 kg (265 lb)     Arrived from: home  Chief Complaint:   Chief Complaint   Patient presents with    Groin Swelling     Testicle swelling/pain x 5 days. Also complains of bilateral leg swelling/pain. Hospital Problem/Diagnosis:  Principal Problem:    Cellulitis of scrotum  Resolved Problems:    * No resolved hospital problems. *    Imaging:   US DUP ABD PEL RETRO SCROT COMPLETE   Final Result   1. Unremarkable sonographic appearance of the bilateral testicles, without   evidence of torsion or mass   2. Small bilateral hydroceles. 3. Findings suggestive of scrotal cellulitis, without evidence of abscess. US SCROTUM AND TESTICLES   Final Result   1. Unremarkable sonographic appearance of the bilateral testicles, without   evidence of torsion or mass   2. Small bilateral hydroceles. 3. Findings suggestive of scrotal cellulitis, without evidence of abscess. XR CHEST PORTABLE   Final Result   Cardiac enlargement vascular prominence is suggestive of failure.    Retrocardiac density suggests atelectasis/infiltrate or effusion           Abnormal labs:   Abnormal Labs Reviewed   CBC WITH AUTO DIFFERENTIAL - Abnormal; Notable for the following components:       Result Value    WBC 3.3 (*)     RBC 2.55 (*)     Hemoglobin 8.0 (*)     Hematocrit 25.3 (*)     RDW 24.0 (*)     Lymphocytes Absolute 0.9 (*)     All other components within normal limits   COMPREHENSIVE METABOLIC PANEL W/ REFLEX TO MG FOR LOW K - Abnormal; Notable for the following components:    CO2 33 (*)     Glucose

## 2023-12-22 NOTE — ED NOTES
Pt requesting tanner catheter due to scrotal swelling and endorsing trouble urinating. Hospitalist carolyn served regarding this, order obtained for tanner catheter. This RN attempted to insert cath, unable to insert due to scrotal swelling. Pt then stating he feels like he has to urinate. Pt started to urinate into urinal with assistance. Urine sample obtained and sent.

## 2023-12-22 NOTE — ED NOTES
Report given to [facility] RN. All questions answered. Awaiting transport to floor at this time.

## 2023-12-22 NOTE — ED NOTES
Pt wife attempted to assist pt with urinal, urine spilled onto pt bed and clothes. Changed into gown, new sheets and blankets applied.

## 2023-12-22 NOTE — H&P
HOSPITALISTS HISTORY AND PHYSICAL    12/22/2023 4:50 PM    Patient Information:  Elvis Jernigan is a 79 y.o. male [id number]  PCP:  Penelope Yoo (Tel: [phone] )    Chief complaint:    Chief Complaint   Patient presents with    Groin Swelling     Testicle swelling/pain x 5 days. Also complains of bilateral leg swelling/pain. History of Present Illness:  Elvis Jernigan is a 79 y.o. male who presented with to ER with complaints of leg pain and swelling. With testicular pain. Ongoing for 5 days. Patient has chronic bilateral lymphedema. It has increased swelling. Patient also noted scrotum is swollen significantly having difficulty urinating. Noted some redness around it. Subjective chills no fever. Patient denies any history of heart issues. Patient is on torsemide increased dose recently by PCP but not helping. REVIEW OF SYSTEMS:   Constitutional: Negative for fever,chills or night sweats  ENT: Negative for rhinorrhea, epistaxis, hoarseness, sore throat. Respiratory: Negative for shortness of breath,wheezing  Cardiovascular: Negative for chest pain, palpitations   Gastrointestinal: Negative for nausea, vomiting, diarrhea  Genitourinary: Negative for polyuria, dysuria   Hematologic/Lymphatic: Negative for bleeding tendency, easy bruising  Musculoskeletal: Negative for myalgias and arthralgias  Neurologic: Negative for confusion,dysarthria. Skin: Negative for itching,rash, good capillary refill. Psychiatric: Negative for depression,anxiety, agitation. Endocrine: Negative for polydipsia,polyuria,heat /cold intolerance. Past Medical History:   has a past medical history of Arthritis, Atrial fibrillation ([address]), CAD (coronary artery disease), Diabetes mellitus ([address]), Hyperlipidemia, Hypertension, and Thyroid disease.      Past

## 2023-12-23 LAB
EST. AVERAGE GLUCOSE BLD GHB EST-MCNC: 151.3 MG/DL
GLUCOSE BLD-MCNC: 107 MG/DL (ref 70–99)
GLUCOSE BLD-MCNC: 133 MG/DL (ref 70–99)
GLUCOSE BLD-MCNC: 174 MG/DL (ref 70–99)
GLUCOSE BLD-MCNC: 197 MG/DL (ref 70–99)
HBA1C MFR BLD: 6.9 %
PERFORMED ON: ABNORMAL

## 2023-12-23 PROCEDURE — 6360000002 HC RX W HCPCS: Performed by: HOSPITALIST

## 2023-12-23 PROCEDURE — 2580000003 HC RX 258: Performed by: HOSPITALIST

## 2023-12-23 PROCEDURE — 6360000004 HC RX CONTRAST MEDICATION: Performed by: HOSPITALIST

## 2023-12-23 PROCEDURE — C8929 TTE W OR WO FOL WCON,DOPPLER: HCPCS

## 2023-12-23 PROCEDURE — 6370000000 HC RX 637 (ALT 250 FOR IP): Performed by: HOSPITALIST

## 2023-12-23 PROCEDURE — 99223 1ST HOSP IP/OBS HIGH 75: CPT | Performed by: INTERNAL MEDICINE

## 2023-12-23 PROCEDURE — 1200000000 HC SEMI PRIVATE

## 2023-12-23 RX ADMIN — OXYCODONE AND ACETAMINOPHEN 1 TABLET: 5; 325 TABLET ORAL at 14:45

## 2023-12-23 RX ADMIN — GABAPENTIN 400 MG: 400 CAPSULE ORAL at 14:46

## 2023-12-23 RX ADMIN — FUROSEMIDE 40 MG: 10 INJECTION, SOLUTION INTRAMUSCULAR; INTRAVENOUS at 18:48

## 2023-12-23 RX ADMIN — GABAPENTIN 400 MG: 400 CAPSULE ORAL at 17:51

## 2023-12-23 RX ADMIN — INSULIN GLARGINE 35 UNITS: 100 INJECTION, SOLUTION SUBCUTANEOUS at 20:52

## 2023-12-23 RX ADMIN — GABAPENTIN 400 MG: 400 CAPSULE ORAL at 09:18

## 2023-12-23 RX ADMIN — LEVOTHYROXINE SODIUM 175 MCG: 0.15 TABLET ORAL at 09:17

## 2023-12-23 RX ADMIN — SODIUM CHLORIDE, PRESERVATIVE FREE 10 ML: 5 INJECTION INTRAVENOUS at 20:53

## 2023-12-23 RX ADMIN — INSULIN LISPRO 5 UNITS: 100 INJECTION, SOLUTION INTRAVENOUS; SUBCUTANEOUS at 12:18

## 2023-12-23 RX ADMIN — GABAPENTIN 400 MG: 400 CAPSULE ORAL at 20:53

## 2023-12-23 RX ADMIN — INSULIN LISPRO 5 UNITS: 100 INJECTION, SOLUTION INTRAVENOUS; SUBCUTANEOUS at 09:17

## 2023-12-23 RX ADMIN — OXYCODONE AND ACETAMINOPHEN 1 TABLET: 5; 325 TABLET ORAL at 09:19

## 2023-12-23 RX ADMIN — INSULIN GLARGINE 35 UNITS: 100 INJECTION, SOLUTION SUBCUTANEOUS at 00:21

## 2023-12-23 RX ADMIN — APIXABAN 2.5 MG: 2.5 TABLET, FILM COATED ORAL at 12:18

## 2023-12-23 RX ADMIN — APIXABAN 2.5 MG: 5 TABLET, FILM COATED ORAL at 09:18

## 2023-12-23 RX ADMIN — APIXABAN 5 MG: 5 TABLET, FILM COATED ORAL at 20:52

## 2023-12-23 RX ADMIN — SODIUM CHLORIDE, PRESERVATIVE FREE 10 ML: 5 INJECTION INTRAVENOUS at 09:19

## 2023-12-23 RX ADMIN — FUROSEMIDE 40 MG: 10 INJECTION, SOLUTION INTRAMUSCULAR; INTRAVENOUS at 09:18

## 2023-12-23 RX ADMIN — AMLODIPINE BESYLATE 2.5 MG: 5 TABLET ORAL at 00:22

## 2023-12-23 RX ADMIN — PERFLUTREN 1.5 ML: 6.52 INJECTION, SUSPENSION INTRAVENOUS at 12:58

## 2023-12-24 VITALS
WEIGHT: 265 LBS | BODY MASS INDEX: 35.89 KG/M2 | DIASTOLIC BLOOD PRESSURE: 65 MMHG | HEART RATE: 68 BPM | HEIGHT: 72 IN | RESPIRATION RATE: 16 BRPM | TEMPERATURE: 97.7 F | OXYGEN SATURATION: 92 % | SYSTOLIC BLOOD PRESSURE: 117 MMHG

## 2023-12-24 LAB
ANION GAP SERPL CALCULATED.3IONS-SCNC: 2 MMOL/L (ref 3–16)
BASOPHILS # BLD: 0 K/UL (ref 0–0.2)
BASOPHILS NFR BLD: 0.8 %
BUN SERPL-MCNC: 30 MG/DL (ref 7–20)
CALCIUM SERPL-MCNC: 8.6 MG/DL (ref 8.3–10.6)
CHLORIDE SERPL-SCNC: 101 MMOL/L (ref 99–110)
CO2 SERPL-SCNC: 38 MMOL/L (ref 21–32)
CREAT SERPL-MCNC: 1.3 MG/DL (ref 0.8–1.3)
DEPRECATED RDW RBC AUTO: 23.4 % (ref 12.4–15.4)
EOSINOPHIL # BLD: 0 K/UL (ref 0–0.6)
EOSINOPHIL NFR BLD: 0.8 %
GFR SERPLBLD CREATININE-BSD FMLA CKD-EPI: 54 ML/MIN/{1.73_M2}
GLUCOSE BLD-MCNC: 115 MG/DL (ref 70–99)
GLUCOSE BLD-MCNC: 152 MG/DL (ref 70–99)
GLUCOSE SERPL-MCNC: 74 MG/DL (ref 70–99)
HCT VFR BLD AUTO: 27.6 % (ref 40.5–52.5)
HGB BLD-MCNC: 8.5 G/DL (ref 13.5–17.5)
LYMPHOCYTES # BLD: 1.5 K/UL (ref 1–5.1)
LYMPHOCYTES NFR BLD: 49.9 %
MCH RBC QN AUTO: 31 PG (ref 26–34)
MCHC RBC AUTO-ENTMCNC: 31 G/DL (ref 31–36)
MCV RBC AUTO: 100 FL (ref 80–100)
MONOCYTES # BLD: 0.2 K/UL (ref 0–1.3)
MONOCYTES NFR BLD: 5.7 %
NEUTROPHILS # BLD: 1.3 K/UL (ref 1.7–7.7)
NEUTROPHILS NFR BLD: 42.8 %
PERFORMED ON: ABNORMAL
PERFORMED ON: ABNORMAL
PLATELET # BLD AUTO: 166 K/UL (ref 135–450)
PMV BLD AUTO: 7.9 FL (ref 5–10.5)
POTASSIUM SERPL-SCNC: 4.1 MMOL/L (ref 3.5–5.1)
RBC # BLD AUTO: 2.76 M/UL (ref 4.2–5.9)
SODIUM SERPL-SCNC: 141 MMOL/L (ref 136–145)
WBC # BLD AUTO: 3 K/UL (ref 4–11)

## 2023-12-24 PROCEDURE — 80048 BASIC METABOLIC PNL TOTAL CA: CPT

## 2023-12-24 PROCEDURE — 6360000002 HC RX W HCPCS: Performed by: HOSPITALIST

## 2023-12-24 PROCEDURE — 85025 COMPLETE CBC W/AUTO DIFF WBC: CPT

## 2023-12-24 PROCEDURE — 99232 SBSQ HOSP IP/OBS MODERATE 35: CPT | Performed by: INTERNAL MEDICINE

## 2023-12-24 PROCEDURE — 6370000000 HC RX 637 (ALT 250 FOR IP): Performed by: HOSPITALIST

## 2023-12-24 PROCEDURE — 2580000003 HC RX 258: Performed by: HOSPITALIST

## 2023-12-24 RX ADMIN — OXYCODONE AND ACETAMINOPHEN 1 TABLET: 5; 325 TABLET ORAL at 10:56

## 2023-12-24 RX ADMIN — APIXABAN 5 MG: 5 TABLET, FILM COATED ORAL at 10:56

## 2023-12-24 RX ADMIN — SODIUM CHLORIDE, PRESERVATIVE FREE 10 ML: 5 INJECTION INTRAVENOUS at 10:58

## 2023-12-24 RX ADMIN — GABAPENTIN 400 MG: 400 CAPSULE ORAL at 10:55

## 2023-12-24 RX ADMIN — LEVOTHYROXINE SODIUM 175 MCG: 0.15 TABLET ORAL at 10:55

## 2023-12-24 RX ADMIN — FUROSEMIDE 40 MG: 10 INJECTION, SOLUTION INTRAMUSCULAR; INTRAVENOUS at 10:57

## 2023-12-27 ENCOUNTER — TELEPHONE (OUTPATIENT)
Dept: CARDIOLOGY CLINIC | Age: 83
End: 2023-12-27

## 2023-12-27 NOTE — TELEPHONE ENCOUNTER
Pt has been scheduled on 1/8/24 with NPTS for hospital FU. Is it ok for pt to wait to have monitor placed at hosp fu or Should pt have placed before fu. Please advise.

## 2023-12-28 NOTE — TELEPHONE ENCOUNTER
I called Isaura Hsu to notify that per [facility] -  Fine to wait to have monitor placed when he comes to the clinic. Brady's Wife, Lm Epperson answered the phone. He was not available. Per HIPAA, I relayed the message to her. She verbalized understanding and denied further questions and/or concerns at this time.

## 2024-01-01 ENCOUNTER — TELEPHONE (OUTPATIENT)
Dept: CARDIOLOGY CLINIC | Age: 84
End: 2024-01-01

## 2024-01-01 DIAGNOSIS — R06.02 SOB (SHORTNESS OF BREATH): ICD-10-CM

## 2024-01-01 DIAGNOSIS — I10 ESSENTIAL HYPERTENSION: Primary | ICD-10-CM

## 2024-01-01 DIAGNOSIS — D64.9 ANEMIA, UNSPECIFIED TYPE: Primary | ICD-10-CM

## 2024-01-02 ENCOUNTER — APPOINTMENT (OUTPATIENT)
Dept: GENERAL RADIOLOGY | Age: 84
DRG: 291 | End: 2024-01-02
Payer: MEDICARE

## 2024-01-02 ENCOUNTER — HOSPITAL ENCOUNTER (INPATIENT)
Age: 84
LOS: 7 days | Discharge: HOME HEALTH | DRG: 291 | End: 2024-01-09
Attending: EMERGENCY MEDICINE | Admitting: INTERNAL MEDICINE
Payer: MEDICARE

## 2024-01-02 ENCOUNTER — TELEPHONE (OUTPATIENT)
Dept: CARDIOLOGY CLINIC | Age: 84
End: 2024-01-02

## 2024-01-02 DIAGNOSIS — I50.9 ACUTE ON CHRONIC CONGESTIVE HEART FAILURE, UNSPECIFIED HEART FAILURE TYPE (HCC): Primary | ICD-10-CM

## 2024-01-02 PROBLEM — I50.33 ACUTE ON CHRONIC DIASTOLIC CHF (CONGESTIVE HEART FAILURE) (HCC): Status: ACTIVE | Noted: 2024-01-02

## 2024-01-02 PROBLEM — N17.9 ACUTE KIDNEY INJURY SUPERIMPOSED ON CKD (HCC): Status: ACTIVE | Noted: 2024-01-02

## 2024-01-02 PROBLEM — N18.9 ACUTE KIDNEY INJURY SUPERIMPOSED ON CKD (HCC): Status: ACTIVE | Noted: 2024-01-02

## 2024-01-02 LAB
ALBUMIN SERPL-MCNC: 3.1 G/DL (ref 3.4–5)
ALBUMIN/GLOB SERPL: 0.9 {RATIO} (ref 1.1–2.2)
ALP SERPL-CCNC: 97 U/L (ref 40–129)
ALT SERPL-CCNC: 16 U/L (ref 10–40)
ANION GAP SERPL CALCULATED.3IONS-SCNC: 5 MMOL/L (ref 3–16)
AST SERPL-CCNC: 26 U/L (ref 15–37)
BASOPHILS # BLD: 0 K/UL (ref 0–0.2)
BASOPHILS NFR BLD: 0.3 %
BILIRUB SERPL-MCNC: 0.4 MG/DL (ref 0–1)
BILIRUB UR QL STRIP.AUTO: NEGATIVE
BUN SERPL-MCNC: 77 MG/DL (ref 7–20)
CALCIUM SERPL-MCNC: 9 MG/DL (ref 8.3–10.6)
CHLORIDE SERPL-SCNC: 98 MMOL/L (ref 99–110)
CLARITY UR: CLEAR
CO2 SERPL-SCNC: 32 MMOL/L (ref 21–32)
COLOR UR: YELLOW
CREAT SERPL-MCNC: 2 MG/DL (ref 0.8–1.3)
DEPRECATED RDW RBC AUTO: 24.9 % (ref 12.4–15.4)
EKG ATRIAL RATE: 44 BPM
EKG ATRIAL RATE: 68 BPM
EKG DIAGNOSIS: NORMAL
EKG DIAGNOSIS: NORMAL
EKG P AXIS: -13 DEGREES
EKG P-R INTERVAL: 80 MS
EKG Q-T INTERVAL: 472 MS
EKG Q-T INTERVAL: 508 MS
EKG QRS DURATION: 130 MS
EKG QRS DURATION: 166 MS
EKG QTC CALCULATION (BAZETT): 475 MS
EKG QTC CALCULATION (BAZETT): 476 MS
EKG R AXIS: -32 DEGREES
EKG R AXIS: -50 DEGREES
EKG T AXIS: -50 DEGREES
EKG T AXIS: -53 DEGREES
EKG VENTRICULAR RATE: 53 BPM
EKG VENTRICULAR RATE: 61 BPM
EOSINOPHIL # BLD: 0 K/UL (ref 0–0.6)
EOSINOPHIL NFR BLD: 0.4 %
GFR SERPLBLD CREATININE-BSD FMLA CKD-EPI: 32 ML/MIN/{1.73_M2}
GLUCOSE BLD-MCNC: 266 MG/DL (ref 70–99)
GLUCOSE SERPL-MCNC: 127 MG/DL (ref 70–99)
GLUCOSE UR STRIP.AUTO-MCNC: 100 MG/DL
HCT VFR BLD AUTO: 25.2 % (ref 40.5–52.5)
HGB BLD-MCNC: 8 G/DL (ref 13.5–17.5)
HGB UR QL STRIP.AUTO: NEGATIVE
KETONES UR STRIP.AUTO-MCNC: NEGATIVE MG/DL
LEUKOCYTE ESTERASE UR QL STRIP.AUTO: NEGATIVE
LYMPHOCYTES # BLD: 1.1 K/UL (ref 1–5.1)
LYMPHOCYTES NFR BLD: 32.8 %
MAGNESIUM SERPL-MCNC: 2.2 MG/DL (ref 1.8–2.4)
MCH RBC QN AUTO: 31.7 PG (ref 26–34)
MCHC RBC AUTO-ENTMCNC: 31.7 G/DL (ref 31–36)
MCV RBC AUTO: 100 FL (ref 80–100)
MONOCYTES # BLD: 0.2 K/UL (ref 0–1.3)
MONOCYTES NFR BLD: 5.1 %
NEUTROPHILS # BLD: 2.1 K/UL (ref 1.7–7.7)
NEUTROPHILS NFR BLD: 61.4 %
NITRITE UR QL STRIP.AUTO: NEGATIVE
NT-PROBNP SERPL-MCNC: 3421 PG/ML (ref 0–449)
PERFORMED ON: ABNORMAL
PH UR STRIP.AUTO: 5.5 [PH] (ref 5–8)
PHOSPHATE SERPL-MCNC: 4.5 MG/DL (ref 2.5–4.9)
PLATELET # BLD AUTO: 144 K/UL (ref 135–450)
PMV BLD AUTO: 8.1 FL (ref 5–10.5)
POTASSIUM SERPL-SCNC: 4.5 MMOL/L (ref 3.5–5.1)
PROT SERPL-MCNC: 6.6 G/DL (ref 6.4–8.2)
PROT UR STRIP.AUTO-MCNC: NEGATIVE MG/DL
RBC # BLD AUTO: 2.52 M/UL (ref 4.2–5.9)
SODIUM SERPL-SCNC: 135 MMOL/L (ref 136–145)
SP GR UR STRIP.AUTO: 1.01 (ref 1–1.03)
T4 SERPL-MCNC: 5 UG/DL (ref 4.5–10.9)
TROPONIN, HIGH SENSITIVITY: 346 NG/L (ref 0–22)
TROPONIN, HIGH SENSITIVITY: 347 NG/L (ref 0–22)
TROPONIN, HIGH SENSITIVITY: 391 NG/L (ref 0–22)
TSH SERPL DL<=0.005 MIU/L-ACNC: 8.29 UIU/ML (ref 0.27–4.2)
UA DIPSTICK W REFLEX MICRO PNL UR: ABNORMAL
URN SPEC COLLECT METH UR: ABNORMAL
UROBILINOGEN UR STRIP-ACNC: 0.2 E.U./DL
WBC # BLD AUTO: 3.3 K/UL (ref 4–11)

## 2024-01-02 PROCEDURE — 83540 ASSAY OF IRON: CPT

## 2024-01-02 PROCEDURE — 84443 ASSAY THYROID STIM HORMONE: CPT

## 2024-01-02 PROCEDURE — 93005 ELECTROCARDIOGRAM TRACING: CPT | Performed by: EMERGENCY MEDICINE

## 2024-01-02 PROCEDURE — 81003 URINALYSIS AUTO W/O SCOPE: CPT

## 2024-01-02 PROCEDURE — 96365 THER/PROPH/DIAG IV INF INIT: CPT

## 2024-01-02 PROCEDURE — 93010 ELECTROCARDIOGRAM REPORT: CPT | Performed by: INTERNAL MEDICINE

## 2024-01-02 PROCEDURE — 96375 TX/PRO/DX INJ NEW DRUG ADDON: CPT

## 2024-01-02 PROCEDURE — 6370000000 HC RX 637 (ALT 250 FOR IP): Performed by: INTERNAL MEDICINE

## 2024-01-02 PROCEDURE — 83550 IRON BINDING TEST: CPT

## 2024-01-02 PROCEDURE — 99285 EMERGENCY DEPT VISIT HI MDM: CPT

## 2024-01-02 PROCEDURE — 84484 ASSAY OF TROPONIN QUANT: CPT

## 2024-01-02 PROCEDURE — 96366 THER/PROPH/DIAG IV INF ADDON: CPT

## 2024-01-02 PROCEDURE — 84100 ASSAY OF PHOSPHORUS: CPT

## 2024-01-02 PROCEDURE — 6360000002 HC RX W HCPCS: Performed by: EMERGENCY MEDICINE

## 2024-01-02 PROCEDURE — P9047 ALBUMIN (HUMAN), 25%, 50ML: HCPCS | Performed by: EMERGENCY MEDICINE

## 2024-01-02 PROCEDURE — 36415 COLL VENOUS BLD VENIPUNCTURE: CPT

## 2024-01-02 PROCEDURE — 2580000003 HC RX 258: Performed by: INTERNAL MEDICINE

## 2024-01-02 PROCEDURE — 83735 ASSAY OF MAGNESIUM: CPT

## 2024-01-02 PROCEDURE — 6360000002 HC RX W HCPCS: Performed by: INTERNAL MEDICINE

## 2024-01-02 PROCEDURE — 85025 COMPLETE CBC W/AUTO DIFF WBC: CPT

## 2024-01-02 PROCEDURE — 80053 COMPREHEN METABOLIC PANEL: CPT

## 2024-01-02 PROCEDURE — 84436 ASSAY OF TOTAL THYROXINE: CPT

## 2024-01-02 PROCEDURE — 83880 ASSAY OF NATRIURETIC PEPTIDE: CPT

## 2024-01-02 PROCEDURE — 6370000000 HC RX 637 (ALT 250 FOR IP): Performed by: EMERGENCY MEDICINE

## 2024-01-02 PROCEDURE — 71045 X-RAY EXAM CHEST 1 VIEW: CPT

## 2024-01-02 PROCEDURE — 2100000000 HC CCU R&B

## 2024-01-02 RX ORDER — AMLODIPINE BESYLATE 2.5 MG/1
2.5 TABLET ORAL DAILY
Status: ON HOLD | COMMUNITY
End: 2024-01-09 | Stop reason: HOSPADM

## 2024-01-02 RX ORDER — LEVOTHYROXINE SODIUM 175 UG/1
175 CAPSULE ORAL DAILY
Status: DISCONTINUED | OUTPATIENT
Start: 2024-01-03 | End: 2024-01-02

## 2024-01-02 RX ORDER — DOXYCYCLINE HYCLATE 100 MG
100 TABLET ORAL 2 TIMES DAILY
Status: DISCONTINUED | OUTPATIENT
Start: 2024-01-03 | End: 2024-01-09 | Stop reason: HOSPADM

## 2024-01-02 RX ORDER — ACETAMINOPHEN 650 MG/1
650 SUPPOSITORY RECTAL EVERY 6 HOURS PRN
Status: DISCONTINUED | OUTPATIENT
Start: 2024-01-02 | End: 2024-01-09 | Stop reason: HOSPADM

## 2024-01-02 RX ORDER — DEXTROSE MONOHYDRATE 100 MG/ML
INJECTION, SOLUTION INTRAVENOUS CONTINUOUS PRN
Status: DISCONTINUED | OUTPATIENT
Start: 2024-01-02 | End: 2024-01-03

## 2024-01-02 RX ORDER — BUMETANIDE 0.25 MG/ML
1 INJECTION INTRAMUSCULAR; INTRAVENOUS ONCE
Status: COMPLETED | OUTPATIENT
Start: 2024-01-02 | End: 2024-01-02

## 2024-01-02 RX ORDER — SODIUM CHLORIDE 0.9 % (FLUSH) 0.9 %
5-40 SYRINGE (ML) INJECTION PRN
Status: DISCONTINUED | OUTPATIENT
Start: 2024-01-02 | End: 2024-01-09 | Stop reason: HOSPADM

## 2024-01-02 RX ORDER — DOCUSATE SODIUM 100 MG/1
100 CAPSULE, LIQUID FILLED ORAL 2 TIMES DAILY
Status: DISCONTINUED | OUTPATIENT
Start: 2024-01-02 | End: 2024-01-09 | Stop reason: HOSPADM

## 2024-01-02 RX ORDER — ACETAMINOPHEN 500 MG
1000 TABLET ORAL ONCE
Status: COMPLETED | OUTPATIENT
Start: 2024-01-02 | End: 2024-01-02

## 2024-01-02 RX ORDER — INSULIN GLARGINE 100 [IU]/ML
20 INJECTION, SOLUTION SUBCUTANEOUS NIGHTLY
Status: DISCONTINUED | OUTPATIENT
Start: 2024-01-02 | End: 2024-01-09

## 2024-01-02 RX ORDER — ALBUMIN (HUMAN) 12.5 G/50ML
50 SOLUTION INTRAVENOUS ONCE
Status: COMPLETED | OUTPATIENT
Start: 2024-01-02 | End: 2024-01-02

## 2024-01-02 RX ORDER — INSULIN LISPRO 100 [IU]/ML
0-8 INJECTION, SOLUTION INTRAVENOUS; SUBCUTANEOUS
Status: DISCONTINUED | OUTPATIENT
Start: 2024-01-03 | End: 2024-01-08 | Stop reason: DRUGHIGH

## 2024-01-02 RX ORDER — ASPIRIN 81 MG/1
324 TABLET, CHEWABLE ORAL ONCE
Status: COMPLETED | OUTPATIENT
Start: 2024-01-02 | End: 2024-01-02

## 2024-01-02 RX ORDER — ASPIRIN 81 MG/1
81 TABLET ORAL NIGHTLY
COMMUNITY

## 2024-01-02 RX ORDER — GLUCAGON 1 MG/ML
1 KIT INJECTION PRN
Status: DISCONTINUED | OUTPATIENT
Start: 2024-01-02 | End: 2024-01-09 | Stop reason: HOSPADM

## 2024-01-02 RX ORDER — OXYCODONE HYDROCHLORIDE 5 MG/1
5 TABLET ORAL ONCE
Status: COMPLETED | OUTPATIENT
Start: 2024-01-02 | End: 2024-01-02

## 2024-01-02 RX ORDER — POLYETHYLENE GLYCOL 3350 17 G/17G
17 POWDER, FOR SOLUTION ORAL DAILY PRN
Status: DISCONTINUED | OUTPATIENT
Start: 2024-01-02 | End: 2024-01-09 | Stop reason: HOSPADM

## 2024-01-02 RX ORDER — ONDANSETRON 4 MG/1
4 TABLET, ORALLY DISINTEGRATING ORAL EVERY 8 HOURS PRN
Status: DISCONTINUED | OUTPATIENT
Start: 2024-01-02 | End: 2024-01-09 | Stop reason: HOSPADM

## 2024-01-02 RX ORDER — POTASSIUM CHLORIDE 7.45 MG/ML
10 INJECTION INTRAVENOUS PRN
Status: DISCONTINUED | OUTPATIENT
Start: 2024-01-02 | End: 2024-01-09 | Stop reason: HOSPADM

## 2024-01-02 RX ORDER — GABAPENTIN 400 MG/1
400 CAPSULE ORAL 4 TIMES DAILY
Status: DISCONTINUED | OUTPATIENT
Start: 2024-01-02 | End: 2024-01-09 | Stop reason: HOSPADM

## 2024-01-02 RX ORDER — ATORVASTATIN CALCIUM 40 MG/1
40 TABLET, FILM COATED ORAL NIGHTLY
COMMUNITY

## 2024-01-02 RX ORDER — MAGNESIUM SULFATE IN WATER 40 MG/ML
2000 INJECTION, SOLUTION INTRAVENOUS PRN
Status: DISCONTINUED | OUTPATIENT
Start: 2024-01-02 | End: 2024-01-09 | Stop reason: HOSPADM

## 2024-01-02 RX ORDER — ACETAMINOPHEN 325 MG/1
650 TABLET ORAL EVERY 6 HOURS PRN
COMMUNITY

## 2024-01-02 RX ORDER — MILRINONE LACTATE 0.2 MG/ML
.0625-.75 INJECTION, SOLUTION INTRAVENOUS CONTINUOUS
Status: DISCONTINUED | OUTPATIENT
Start: 2024-01-02 | End: 2024-01-05 | Stop reason: ALTCHOICE

## 2024-01-02 RX ORDER — GLUCAGON 1 MG/ML
1 KIT INJECTION PRN
Status: DISCONTINUED | OUTPATIENT
Start: 2024-01-02 | End: 2024-01-03

## 2024-01-02 RX ORDER — DOXYCYCLINE HYCLATE 100 MG
100 TABLET ORAL 2 TIMES DAILY
COMMUNITY

## 2024-01-02 RX ORDER — LOSARTAN POTASSIUM 100 MG/1
100 TABLET ORAL DAILY
Status: ON HOLD | COMMUNITY
End: 2024-01-09 | Stop reason: HOSPADM

## 2024-01-02 RX ORDER — DEXTROSE MONOHYDRATE 100 MG/ML
INJECTION, SOLUTION INTRAVENOUS CONTINUOUS PRN
Status: DISCONTINUED | OUTPATIENT
Start: 2024-01-02 | End: 2024-01-09 | Stop reason: HOSPADM

## 2024-01-02 RX ORDER — POTASSIUM CHLORIDE 7.45 MG/ML
10 INJECTION INTRAVENOUS PRN
Status: DISCONTINUED | OUTPATIENT
Start: 2024-01-02 | End: 2024-01-02 | Stop reason: SDUPTHER

## 2024-01-02 RX ORDER — TRAMADOL HYDROCHLORIDE 50 MG/1
50 TABLET ORAL
Status: ON HOLD | COMMUNITY
End: 2024-01-09 | Stop reason: HOSPADM

## 2024-01-02 RX ORDER — INSULIN LISPRO 100 [IU]/ML
0-4 INJECTION, SOLUTION INTRAVENOUS; SUBCUTANEOUS NIGHTLY
Status: DISCONTINUED | OUTPATIENT
Start: 2024-01-03 | End: 2024-01-08 | Stop reason: DRUGHIGH

## 2024-01-02 RX ORDER — COLCHICINE 0.6 MG/1
0.6 TABLET ORAL DAILY
Status: ON HOLD | COMMUNITY
End: 2024-01-09 | Stop reason: HOSPADM

## 2024-01-02 RX ORDER — ALBUTEROL SULFATE 2.5 MG/3ML
2.5 SOLUTION RESPIRATORY (INHALATION) 2 TIMES DAILY PRN
Status: DISCONTINUED | OUTPATIENT
Start: 2024-01-02 | End: 2024-01-09 | Stop reason: HOSPADM

## 2024-01-02 RX ORDER — ACETAMINOPHEN 325 MG/1
650 TABLET ORAL EVERY 6 HOURS PRN
Status: DISCONTINUED | OUTPATIENT
Start: 2024-01-02 | End: 2024-01-09 | Stop reason: HOSPADM

## 2024-01-02 RX ORDER — ONDANSETRON 2 MG/ML
4 INJECTION INTRAMUSCULAR; INTRAVENOUS EVERY 6 HOURS PRN
Status: DISCONTINUED | OUTPATIENT
Start: 2024-01-02 | End: 2024-01-09 | Stop reason: HOSPADM

## 2024-01-02 RX ORDER — ALLOPURINOL 300 MG/1
300 TABLET ORAL DAILY
Status: DISCONTINUED | OUTPATIENT
Start: 2024-01-03 | End: 2024-01-09 | Stop reason: HOSPADM

## 2024-01-02 RX ORDER — SODIUM CHLORIDE 0.9 % (FLUSH) 0.9 %
5-40 SYRINGE (ML) INJECTION EVERY 12 HOURS SCHEDULED
Status: DISCONTINUED | OUTPATIENT
Start: 2024-01-02 | End: 2024-01-09 | Stop reason: HOSPADM

## 2024-01-02 RX ORDER — SODIUM CHLORIDE 9 MG/ML
INJECTION, SOLUTION INTRAVENOUS PRN
Status: DISCONTINUED | OUTPATIENT
Start: 2024-01-02 | End: 2024-01-09 | Stop reason: HOSPADM

## 2024-01-02 RX ORDER — POTASSIUM CHLORIDE 20 MEQ/1
40 TABLET, EXTENDED RELEASE ORAL PRN
Status: DISCONTINUED | OUTPATIENT
Start: 2024-01-02 | End: 2024-01-09 | Stop reason: HOSPADM

## 2024-01-02 RX ORDER — POTASSIUM CHLORIDE 20 MEQ/1
40 TABLET, EXTENDED RELEASE ORAL PRN
Status: DISCONTINUED | OUTPATIENT
Start: 2024-01-02 | End: 2024-01-02 | Stop reason: SDUPTHER

## 2024-01-02 RX ADMIN — DOCUSATE SODIUM 100 MG: 100 CAPSULE, LIQUID FILLED ORAL at 21:24

## 2024-01-02 RX ADMIN — ACETAMINOPHEN 1000 MG: 500 TABLET ORAL at 12:54

## 2024-01-02 RX ADMIN — MILRINONE LACTATE 0.06 MCG/KG/MIN: 0.2 INJECTION, SOLUTION INTRAVENOUS at 21:08

## 2024-01-02 RX ADMIN — APIXABAN 5 MG: 5 TABLET, FILM COATED ORAL at 21:24

## 2024-01-02 RX ADMIN — GABAPENTIN 400 MG: 400 CAPSULE ORAL at 21:24

## 2024-01-02 RX ADMIN — ASPIRIN 324 MG: 81 TABLET, CHEWABLE ORAL at 13:32

## 2024-01-02 RX ADMIN — INSULIN GLARGINE 20 UNITS: 100 INJECTION, SOLUTION SUBCUTANEOUS at 21:23

## 2024-01-02 RX ADMIN — FUROSEMIDE 5 MG/HR: 10 INJECTION, SOLUTION INTRAMUSCULAR; INTRAVENOUS at 21:38

## 2024-01-02 RX ADMIN — BUMETANIDE 1 MG: 0.25 INJECTION INTRAMUSCULAR; INTRAVENOUS at 12:54

## 2024-01-02 RX ADMIN — OXYCODONE HYDROCHLORIDE 5 MG: 5 TABLET ORAL at 12:54

## 2024-01-02 RX ADMIN — ALBUMIN HUMAN 50 G: 0.25 SOLUTION INTRAVENOUS at 12:55

## 2024-01-02 RX ADMIN — SODIUM CHLORIDE, PRESERVATIVE FREE 10 ML: 5 INJECTION INTRAVENOUS at 21:24

## 2024-01-02 ASSESSMENT — PAIN SCALES - GENERAL
PAINLEVEL_OUTOF10: 0
PAINLEVEL_OUTOF10: 10
PAINLEVEL_OUTOF10: 0
PAINLEVEL_OUTOF10: 8
PAINLEVEL_OUTOF10: 6

## 2024-01-02 ASSESSMENT — ENCOUNTER SYMPTOMS
ABDOMINAL PAIN: 0
BACK PAIN: 0
SHORTNESS OF BREATH: 1
NAUSEA: 0
VOMITING: 0

## 2024-01-02 ASSESSMENT — PAIN DESCRIPTION - PAIN TYPE: TYPE: ACUTE PAIN

## 2024-01-02 ASSESSMENT — PAIN - FUNCTIONAL ASSESSMENT: PAIN_FUNCTIONAL_ASSESSMENT: 0-10

## 2024-01-02 ASSESSMENT — PAIN DESCRIPTION - LOCATION: LOCATION: SCROTUM

## 2024-01-02 NOTE — H&P
SpO2 100%   BMI 35.94 kg/m²     General appearance:  Appears chronically ill, obese, pleasant. Well nourished  Eyes: Sclera clear, pupils equal  ENT: Moist mucus membranes, no thrush. Trachea midline.  Cardiovascular: Regular rhythm, normal S1, S2. No murmur, gallop, rub. 3+ edema in lower extremities  Respiratory: Clear to auscultation bilaterally, no wheeze, good inspiratory effort  Gastrointestinal: Abdomen soft, non-tender, distended, normal bowel sounds  Musculoskeletal: No cyanosis in digits, neck supple  Neurology: Cranial nerves grossly intact. Alert and oriented in time, place and person. No speech or motor deficits  Psychiatry: Appropriate affect. Not agitated  Skin: Warm, dry, normal turgor, no rash.  Chronic LLE skin changes  Brisk capillary refill, peripheral pulses palpable   Labs:  CBC:   Lab Results   Component Value Date/Time    WBC 3.3 01/02/2024 12:14 PM    RBC 2.52 01/02/2024 12:14 PM    HGB 8.0 01/02/2024 12:14 PM    HCT 25.2 01/02/2024 12:14 PM    .0 01/02/2024 12:14 PM    MCH 31.7 01/02/2024 12:14 PM    MCHC 31.7 01/02/2024 12:14 PM    RDW 24.9 01/02/2024 12:14 PM     01/02/2024 12:14 PM    MPV 8.1 01/02/2024 12:14 PM     BMP:    Lab Results   Component Value Date/Time     01/02/2024 12:14 PM    K 4.5 01/02/2024 12:14 PM    K 4.1 12/24/2023 05:29 AM    CL 98 01/02/2024 12:14 PM    CO2 32 01/02/2024 12:14 PM    BUN 77 01/02/2024 12:14 PM    CREATININE 2.0 01/02/2024 12:14 PM    CALCIUM 9.0 01/02/2024 12:14 PM    GFRAA >60 07/15/2022 04:28 AM    LABGLOM 32 01/02/2024 12:14 PM    LABGLOM 53 04/24/2023 09:45 AM    GLUCOSE 127 01/02/2024 12:14 PM     XR CHEST PORTABLE   Final Result   Cardiomegaly with pulmonary vascular congestion and small bilateral pleural   effusions.             Problem List  Principal Problem:    Acute on chronic diastolic CHF (congestive heart failure) (HCC)  Active Problems:    Essential hypertension    Hypothyroid    Diabetes mellitus type 2 in

## 2024-01-02 NOTE — ACP (ADVANCE CARE PLANNING)
Advanced Care Planning Note.    Purpose of Encounter: Advanced care planning in light of acute on chronic diastolic CHF  Parties In Attendance: Patient, wife  Decisional Capacity: Yes  Subjective: Patient with abdominal distension, scrotal and LE edema  Objective: Cr 2.0  Goals of Care Determination: Patient wants limited support (No CPR, no vent, no HD, no surgery, no trach, no PEG)  Plan:  Horne, Lasix gtt, Milrinone gtt, Cardio consult, PT/OT/SLP  Code Status: Limited   Time spent on Advanced care Plannin minutes  Advanced Care Planning Documents: Completed advanced directives on chart, wife is the POA.    Nino Portillo MD  2024 4:38 PM

## 2024-01-02 NOTE — TELEPHONE ENCOUNTER
Katelyn, daughter called to ask LES to have the Pt re-admitted into the Hospital so the Pt will not have to sit 3 hrs in the ER.  Pt is still experiencing swelling and the extreme pain.  The med is not address it.  Please call to discuss.  Thank you

## 2024-01-02 NOTE — ED PROVIDER NOTES
and test results are consistent with a CHF exacerbation. As lab testing revealed hypoalbuminemia and he is already volume overloaded albumin was given in order to support his BP as diuretic medications are being administered.  Serum tropoinin levels are markedly elevated but trending downwards and EKGs x 2 did not show highly suspicious acute ischemic changes. He was given a dose of aspirin. Consulted over the phone with our cardiologist; he had no additional management recommendations at this time.  Discussed exam findings, test results, and expected clinical course with him at length.  Recommended that we proceed with hospitalization and inpatient management of his conditions and he is in agreement with this plan.  Consulted our inpatient medicine staff to see him.    Clinical Impression(s)  1. Acute on chronic congestive heart failure, unspecified heart failure type (HCC)      Provider Signature: MD Nigel Sanchez Michael R, MD  01/02/24 4140

## 2024-01-03 LAB
ANION GAP SERPL CALCULATED.3IONS-SCNC: 7 MMOL/L (ref 3–16)
BASOPHILS # BLD: 0 K/UL (ref 0–0.2)
BASOPHILS NFR BLD: 0.5 %
BUN SERPL-MCNC: 69 MG/DL (ref 7–20)
CALCIUM SERPL-MCNC: 8.9 MG/DL (ref 8.3–10.6)
CHLORIDE SERPL-SCNC: 101 MMOL/L (ref 99–110)
CO2 SERPL-SCNC: 31 MMOL/L (ref 21–32)
CREAT SERPL-MCNC: 1.6 MG/DL (ref 0.8–1.3)
DEPRECATED RDW RBC AUTO: 24 % (ref 12.4–15.4)
EOSINOPHIL # BLD: 0 K/UL (ref 0–0.6)
EOSINOPHIL NFR BLD: 0.7 %
EST. AVERAGE GLUCOSE BLD GHB EST-MCNC: 154.2 MG/DL
FERRITIN SERPL IA-MCNC: 72 NG/ML (ref 30–400)
FOLATE SERPL-MCNC: >20 NG/ML (ref 4.78–24.2)
GFR SERPLBLD CREATININE-BSD FMLA CKD-EPI: 42 ML/MIN/{1.73_M2}
GLUCOSE BLD-MCNC: 176 MG/DL (ref 70–99)
GLUCOSE BLD-MCNC: 185 MG/DL (ref 70–99)
GLUCOSE BLD-MCNC: 218 MG/DL (ref 70–99)
GLUCOSE BLD-MCNC: 279 MG/DL (ref 70–99)
GLUCOSE SERPL-MCNC: 168 MG/DL (ref 70–99)
HBA1C MFR BLD: 7 %
HCT VFR BLD AUTO: 24 % (ref 40.5–52.5)
HGB BLD-MCNC: 7.6 G/DL (ref 13.5–17.5)
LYMPHOCYTES # BLD: 1.1 K/UL (ref 1–5.1)
LYMPHOCYTES NFR BLD: 41.6 %
MAGNESIUM SERPL-MCNC: 2.3 MG/DL (ref 1.8–2.4)
MCH RBC QN AUTO: 31.8 PG (ref 26–34)
MCHC RBC AUTO-ENTMCNC: 31.5 G/DL (ref 31–36)
MCV RBC AUTO: 100.9 FL (ref 80–100)
MONOCYTES # BLD: 0.1 K/UL (ref 0–1.3)
MONOCYTES NFR BLD: 5.4 %
NEUTROPHILS # BLD: 1.4 K/UL (ref 1.7–7.7)
NEUTROPHILS NFR BLD: 51.8 %
PERFORMED ON: ABNORMAL
PLATELET # BLD AUTO: 130 K/UL (ref 135–450)
PMV BLD AUTO: 8.4 FL (ref 5–10.5)
POTASSIUM SERPL-SCNC: 4.4 MMOL/L (ref 3.5–5.1)
RBC # BLD AUTO: 2.38 M/UL (ref 4.2–5.9)
SODIUM SERPL-SCNC: 139 MMOL/L (ref 136–145)
TROPONIN, HIGH SENSITIVITY: 349 NG/L (ref 0–22)
TROPONIN, HIGH SENSITIVITY: 366 NG/L (ref 0–22)
VIT B12 SERPL-MCNC: 769 PG/ML (ref 211–911)
WBC # BLD AUTO: 2.6 K/UL (ref 4–11)

## 2024-01-03 PROCEDURE — 97166 OT EVAL MOD COMPLEX 45 MIN: CPT

## 2024-01-03 PROCEDURE — 83036 HEMOGLOBIN GLYCOSYLATED A1C: CPT

## 2024-01-03 PROCEDURE — 84484 ASSAY OF TROPONIN QUANT: CPT

## 2024-01-03 PROCEDURE — 82607 VITAMIN B-12: CPT

## 2024-01-03 PROCEDURE — 6360000002 HC RX W HCPCS: Performed by: INTERNAL MEDICINE

## 2024-01-03 PROCEDURE — 83735 ASSAY OF MAGNESIUM: CPT

## 2024-01-03 PROCEDURE — 92610 EVALUATE SWALLOWING FUNCTION: CPT

## 2024-01-03 PROCEDURE — 97530 THERAPEUTIC ACTIVITIES: CPT

## 2024-01-03 PROCEDURE — 97116 GAIT TRAINING THERAPY: CPT

## 2024-01-03 PROCEDURE — 82746 ASSAY OF FOLIC ACID SERUM: CPT

## 2024-01-03 PROCEDURE — 99222 1ST HOSP IP/OBS MODERATE 55: CPT | Performed by: STUDENT IN AN ORGANIZED HEALTH CARE EDUCATION/TRAINING PROGRAM

## 2024-01-03 PROCEDURE — 92526 ORAL FUNCTION THERAPY: CPT

## 2024-01-03 PROCEDURE — 80048 BASIC METABOLIC PNL TOTAL CA: CPT

## 2024-01-03 PROCEDURE — 36415 COLL VENOUS BLD VENIPUNCTURE: CPT

## 2024-01-03 PROCEDURE — 6370000000 HC RX 637 (ALT 250 FOR IP): Performed by: INTERNAL MEDICINE

## 2024-01-03 PROCEDURE — 2000000000 HC ICU R&B

## 2024-01-03 PROCEDURE — 97162 PT EVAL MOD COMPLEX 30 MIN: CPT

## 2024-01-03 PROCEDURE — 2580000003 HC RX 258: Performed by: INTERNAL MEDICINE

## 2024-01-03 PROCEDURE — 85025 COMPLETE CBC W/AUTO DIFF WBC: CPT

## 2024-01-03 PROCEDURE — 82728 ASSAY OF FERRITIN: CPT

## 2024-01-03 RX ADMIN — APIXABAN 2.5 MG: 2.5 TABLET, FILM COATED ORAL at 20:12

## 2024-01-03 RX ADMIN — DOCUSATE SODIUM 100 MG: 100 CAPSULE, LIQUID FILLED ORAL at 20:12

## 2024-01-03 RX ADMIN — INSULIN LISPRO 4 UNITS: 100 INJECTION, SOLUTION INTRAVENOUS; SUBCUTANEOUS at 16:10

## 2024-01-03 RX ADMIN — DOXYCYCLINE HYCLATE 100 MG: 100 TABLET, COATED ORAL at 20:12

## 2024-01-03 RX ADMIN — SODIUM CHLORIDE, PRESERVATIVE FREE 10 ML: 5 INJECTION INTRAVENOUS at 20:08

## 2024-01-03 RX ADMIN — DOXYCYCLINE HYCLATE 100 MG: 100 TABLET, COATED ORAL at 00:34

## 2024-01-03 RX ADMIN — GABAPENTIN 400 MG: 400 CAPSULE ORAL at 16:10

## 2024-01-03 RX ADMIN — ACETAMINOPHEN 650 MG: 325 TABLET ORAL at 10:20

## 2024-01-03 RX ADMIN — FUROSEMIDE 5 MG/HR: 10 INJECTION, SOLUTION INTRAMUSCULAR; INTRAVENOUS at 16:11

## 2024-01-03 RX ADMIN — GABAPENTIN 400 MG: 400 CAPSULE ORAL at 13:02

## 2024-01-03 RX ADMIN — DOXYCYCLINE HYCLATE 100 MG: 100 TABLET, COATED ORAL at 08:22

## 2024-01-03 RX ADMIN — GABAPENTIN 400 MG: 400 CAPSULE ORAL at 20:12

## 2024-01-03 RX ADMIN — INSULIN GLARGINE 20 UNITS: 100 INJECTION, SOLUTION SUBCUTANEOUS at 20:12

## 2024-01-03 RX ADMIN — ACETAMINOPHEN 650 MG: 325 TABLET ORAL at 20:12

## 2024-01-03 RX ADMIN — SODIUM CHLORIDE, PRESERVATIVE FREE 10 ML: 5 INJECTION INTRAVENOUS at 08:23

## 2024-01-03 RX ADMIN — LEVOTHYROXINE SODIUM 175 MCG: 0.03 TABLET ORAL at 05:02

## 2024-01-03 RX ADMIN — DOCUSATE SODIUM 100 MG: 100 CAPSULE, LIQUID FILLED ORAL at 08:22

## 2024-01-03 RX ADMIN — ALLOPURINOL 300 MG: 300 TABLET ORAL at 08:23

## 2024-01-03 RX ADMIN — APIXABAN 5 MG: 5 TABLET, FILM COATED ORAL at 08:23

## 2024-01-03 RX ADMIN — GABAPENTIN 400 MG: 400 CAPSULE ORAL at 08:22

## 2024-01-03 ASSESSMENT — PAIN DESCRIPTION - LOCATION
LOCATION: COCCYX
LOCATION: SCROTUM

## 2024-01-03 ASSESSMENT — PAIN DESCRIPTION - DESCRIPTORS: DESCRIPTORS: PRESSURE

## 2024-01-03 ASSESSMENT — PAIN SCALES - GENERAL
PAINLEVEL_OUTOF10: 7
PAINLEVEL_OUTOF10: 0
PAINLEVEL_OUTOF10: 3

## 2024-01-03 NOTE — PLAN OF CARE
Problem: Pain  Goal: Verbalizes/displays adequate comfort level or baseline comfort level  1/3/2024 0753 by Sinai Alanis APRN - CNP  Outcome: Progressing  1/2/2024 2311 by Mabel Garcia RN  Outcome: Progressing     Problem: Skin/Tissue Integrity  Goal: Absence of new skin breakdown  Description: 1.  Monitor for areas of redness and/or skin breakdown  2.  Assess vascular access sites hourly  3.  Every 4-6 hours minimum:  Change oxygen saturation probe site  4.  Every 4-6 hours:  If on nasal continuous positive airway pressure, respiratory therapy assess nares and determine need for appliance change or resting period.  1/3/2024 0753 by Sinai Alanis APRN - CNP  Outcome: Progressing  1/2/2024 2311 by Mabel Garcia RN  Outcome: Progressing     Problem: Safety - Adult  Goal: Free from fall injury  1/2/2024 2311 by Mabel Garcia RN  Outcome: Progressing     Problem: ABCDS Injury Assessment  Goal: Absence of physical injury  1/2/2024 2311 by Mabel Garcia RN  Outcome: Progressing

## 2024-01-03 NOTE — ED NOTES
Report called to [facility] RN.   
other components within normal limits   COMPREHENSIVE METABOLIC PANEL - Abnormal; Notable for the following components:    Sodium 135 (*)     Chloride 98 (*)     Glucose 127 (*)     BUN 77 (*)     Creatinine 2.0 (*)     Est, Glom Filt Rate 32 (*)     Albumin 3.1 (*)     Albumin/Globulin Ratio 0.9 (*)     All other components within normal limits   URINALYSIS - Abnormal; Notable for the following components:    Glucose, Ur 100 (*)     All other components within normal limits   TROPONIN - Abnormal; Notable for the following components:    Troponin, High Sensitivity 347 (*)     All other components within normal limits     Critical values: yes     Abnormal Assessment Findings: Trop, Crt, BNP    Background  History:   Past Medical History:   Diagnosis Date    Atrial fibrillation (HCC)     CAD (coronary artery disease)     Class 2 obesity     Coronary artery disease     Diabetes mellitus (HCC)     Hyperlipidemia     Hypertension     Hypothyroidism     Left ventricular diastolic dysfunction     Osteoarthritis     Right ventricular systolic heart failure        Assessment    Vitals/MEWS: MEWS Score: 2  Level of Consciousness: Alert (0)   Vitals:    01/02/24 1430 01/02/24 1500 01/02/24 1530 01/02/24 1600   BP: 107/62 111/66 102/69 95/66   Pulse: 53 63 (!) 46 63   Resp: 13 25 13 16   Temp:       TempSrc:       SpO2: 100% 100% 100% 100%   Weight:         FiO2 (%):   O2 Flow Rate: O2 Device: Nasal cannula O2 Flow Rate (L/min): 2 L/min  Cardiac Rhythm:    Pain Assessment:  [x] Verbal [] Ho Azul Scale  Pain Scale: Pain Assessment  Pain Assessment: 0-10  Pain Level: 6  Pain Location: Scrotum  Pain Type: Acute pain  Last documented pain score (0-10 scale) Pain Level: 6  Last documented pain medication administered: 1254  Mental Status: oriented and alert  Orientation Level:    NIH Score:    C-SSRS: Risk of Suicide: No Risk  Bedside swallow:    Mira Coma Scale (GCS): Mira Coma Scale  Eye Opening: Spontaneous  Best Verbal

## 2024-01-03 NOTE — RT PROTOCOL NOTE
RT Inhaler-Nebulizer Bronchodilator Protocol Note    There is a bronchodilator order in the chart from a provider indicating to follow the RT Bronchodilator Protocol and there is an “Initiate RT Inhaler-Nebulizer Bronchodilator Protocol” order as well (see protocol at bottom of note).    CXR Findings:  XR CHEST PORTABLE    Result Date: 1/2/2024  Cardiomegaly with pulmonary vascular congestion and small bilateral pleural effusions.       The findings from the last RT Protocol Assessment were as follows:   History Pulmonary Disease: None or smoker <15 pack years  Respiratory Pattern: Regular pattern and RR 12-20 bpm  Breath Sounds: Clear breath sounds  Cough: Strong, spontaneous, non-productive  Indication for Bronchodilator Therapy: None  Bronchodilator Assessment Score: 0    Aerosolized bronchodilator medication orders have been revised according to the RT Inhaler-Nebulizer Bronchodilator Protocol below.    Respiratory Therapist to perform RT Therapy Protocol Assessment initially then follow the protocol.  Repeat RT Therapy Protocol Assessment PRN for score 0-3 or on second treatment, BID, and PRN for scores above 3.    No Indications - adjust the frequency to every 6 hours PRN wheezing or bronchospasm, if no treatments needed after 48 hours then discontinue using Per Protocol order mode.     If indication present, adjust the RT bronchodilator orders based on the Bronchodilator Assessment Score as indicated below.  Use Inhaler orders unless patient has one or more of the following: on home nebulizer, not able to hold breath for 10 seconds, is not alert and oriented, cannot activate and use MDI correctly, or respiratory rate 25 breaths per minute or more, then use the equivalent nebulizer order(s) with same Frequency and PRN reasons based on the score.  If a patient is on this medication at home then do not decrease Frequency below that used at home.    0-3 - enter or revise RT bronchodilator order(s) to equivalent

## 2024-01-03 NOTE — PLAN OF CARE
Problem: Pain  Goal: Verbalizes/displays adequate comfort level or baseline comfort level  Outcome: Progressing     Problem: Skin/Tissue Integrity  Goal: Absence of new skin breakdown  Description: 1.  Monitor for areas of redness and/or skin breakdown  2.  Assess vascular access sites hourly  3.  Every 4-6 hours minimum:  Change oxygen saturation probe site  4.  Every 4-6 hours:  If on nasal continuous positive airway pressure, respiratory therapy assess nares and determine need for appliance change or resting period.  Outcome: Progressing     Problem: Safety - Adult  Goal: Free from fall injury  Outcome: Progressing     Problem: ABCDS Injury Assessment  Goal: Absence of physical injury  Outcome: Progressing

## 2024-01-04 LAB
ANION GAP SERPL CALCULATED.3IONS-SCNC: 4 MMOL/L (ref 3–16)
ANISOCYTOSIS BLD QL SMEAR: ABNORMAL
BASOPHILS # BLD: 0 K/UL (ref 0–0.2)
BASOPHILS NFR BLD: 0 %
BUN SERPL-MCNC: 67 MG/DL (ref 7–20)
CALCIUM SERPL-MCNC: 8.9 MG/DL (ref 8.3–10.6)
CHLORIDE SERPL-SCNC: 102 MMOL/L (ref 99–110)
CO2 SERPL-SCNC: 35 MMOL/L (ref 21–32)
CREAT SERPL-MCNC: 1.4 MG/DL (ref 0.8–1.3)
DEPRECATED RDW RBC AUTO: 23.6 % (ref 12.4–15.4)
EOSINOPHIL # BLD: 0.1 K/UL (ref 0–0.6)
EOSINOPHIL NFR BLD: 2 %
GFR SERPLBLD CREATININE-BSD FMLA CKD-EPI: 49 ML/MIN/{1.73_M2}
GLUCOSE BLD-MCNC: 144 MG/DL (ref 70–99)
GLUCOSE BLD-MCNC: 189 MG/DL (ref 70–99)
GLUCOSE BLD-MCNC: 196 MG/DL (ref 70–99)
GLUCOSE BLD-MCNC: 246 MG/DL (ref 70–99)
GLUCOSE BLD-MCNC: 68 MG/DL (ref 70–99)
GLUCOSE SERPL-MCNC: 58 MG/DL (ref 70–99)
HCT VFR BLD AUTO: 24.3 % (ref 40.5–52.5)
HGB BLD-MCNC: 7.7 G/DL (ref 13.5–17.5)
LYMPHOCYTES # BLD: 0.8 K/UL (ref 1–5.1)
LYMPHOCYTES NFR BLD: 18 %
MACROCYTES BLD QL SMEAR: ABNORMAL
MAGNESIUM SERPL-MCNC: 2.3 MG/DL (ref 1.8–2.4)
MCH RBC QN AUTO: 32 PG (ref 26–34)
MCHC RBC AUTO-ENTMCNC: 31.8 G/DL (ref 31–36)
MCV RBC AUTO: 100.6 FL (ref 80–100)
MICROCYTES BLD QL SMEAR: ABNORMAL
MONOCYTES # BLD: 0 K/UL (ref 0–1.3)
MONOCYTES NFR BLD: 0 %
NEUTROPHILS # BLD: 3.6 K/UL (ref 1.7–7.7)
NEUTROPHILS NFR BLD: 80 %
PERFORMED ON: ABNORMAL
PLATELET # BLD AUTO: 138 K/UL (ref 135–450)
PLATELET BLD QL SMEAR: ABNORMAL
PMV BLD AUTO: 8.4 FL (ref 5–10.5)
POLYCHROMASIA BLD QL SMEAR: ABNORMAL
POTASSIUM SERPL-SCNC: 3.8 MMOL/L (ref 3.5–5.1)
POTASSIUM SERPL-SCNC: 4.2 MMOL/L (ref 3.5–5.1)
POTASSIUM SERPL-SCNC: 4.4 MMOL/L (ref 3.5–5.1)
RBC # BLD AUTO: 2.41 M/UL (ref 4.2–5.9)
SLIDE REVIEW: ABNORMAL
SODIUM SERPL-SCNC: 141 MMOL/L (ref 136–145)
WBC # BLD AUTO: 4.5 K/UL (ref 4–11)

## 2024-01-04 PROCEDURE — 2580000003 HC RX 258: Performed by: INTERNAL MEDICINE

## 2024-01-04 PROCEDURE — 92526 ORAL FUNCTION THERAPY: CPT

## 2024-01-04 PROCEDURE — 99232 SBSQ HOSP IP/OBS MODERATE 35: CPT | Performed by: STUDENT IN AN ORGANIZED HEALTH CARE EDUCATION/TRAINING PROGRAM

## 2024-01-04 PROCEDURE — 6370000000 HC RX 637 (ALT 250 FOR IP): Performed by: INTERNAL MEDICINE

## 2024-01-04 PROCEDURE — 6360000002 HC RX W HCPCS: Performed by: INTERNAL MEDICINE

## 2024-01-04 PROCEDURE — 80048 BASIC METABOLIC PNL TOTAL CA: CPT

## 2024-01-04 PROCEDURE — 2000000000 HC ICU R&B

## 2024-01-04 PROCEDURE — 83735 ASSAY OF MAGNESIUM: CPT

## 2024-01-04 PROCEDURE — 85025 COMPLETE CBC W/AUTO DIFF WBC: CPT

## 2024-01-04 PROCEDURE — 84132 ASSAY OF SERUM POTASSIUM: CPT

## 2024-01-04 RX ORDER — POLYETHYLENE GLYCOL 3350 17 G/17G
17 POWDER, FOR SOLUTION ORAL DAILY
Status: DISCONTINUED | OUTPATIENT
Start: 2024-01-04 | End: 2024-01-04

## 2024-01-04 RX ADMIN — LEVOTHYROXINE SODIUM 175 MCG: 0.03 TABLET ORAL at 08:21

## 2024-01-04 RX ADMIN — GABAPENTIN 400 MG: 400 CAPSULE ORAL at 18:37

## 2024-01-04 RX ADMIN — INSULIN GLARGINE 20 UNITS: 100 INJECTION, SOLUTION SUBCUTANEOUS at 20:59

## 2024-01-04 RX ADMIN — DOXYCYCLINE HYCLATE 100 MG: 100 TABLET, COATED ORAL at 08:21

## 2024-01-04 RX ADMIN — DOCUSATE SODIUM 100 MG: 100 CAPSULE, LIQUID FILLED ORAL at 20:59

## 2024-01-04 RX ADMIN — ALLOPURINOL 300 MG: 300 TABLET ORAL at 08:21

## 2024-01-04 RX ADMIN — GABAPENTIN 400 MG: 400 CAPSULE ORAL at 08:21

## 2024-01-04 RX ADMIN — ACETAMINOPHEN 650 MG: 325 TABLET ORAL at 09:48

## 2024-01-04 RX ADMIN — APIXABAN 2.5 MG: 2.5 TABLET, FILM COATED ORAL at 20:59

## 2024-01-04 RX ADMIN — GABAPENTIN 400 MG: 400 CAPSULE ORAL at 13:29

## 2024-01-04 RX ADMIN — GABAPENTIN 400 MG: 400 CAPSULE ORAL at 20:59

## 2024-01-04 RX ADMIN — FUROSEMIDE 5 MG/HR: 10 INJECTION, SOLUTION INTRAMUSCULAR; INTRAVENOUS at 18:37

## 2024-01-04 RX ADMIN — MILRINONE LACTATE 0.06 MCG/KG/MIN: 0.2 INJECTION, SOLUTION INTRAVENOUS at 09:36

## 2024-01-04 RX ADMIN — APIXABAN 2.5 MG: 2.5 TABLET, FILM COATED ORAL at 08:21

## 2024-01-04 RX ADMIN — DOXYCYCLINE HYCLATE 100 MG: 100 TABLET, COATED ORAL at 20:59

## 2024-01-04 RX ADMIN — DOCUSATE SODIUM 100 MG: 100 CAPSULE, LIQUID FILLED ORAL at 08:21

## 2024-01-04 RX ADMIN — POLYETHYLENE GLYCOL 3350 17 G: 17 POWDER, FOR SOLUTION ORAL at 14:14

## 2024-01-04 ASSESSMENT — PAIN DESCRIPTION - ORIENTATION: ORIENTATION: LEFT;RIGHT

## 2024-01-04 ASSESSMENT — PAIN DESCRIPTION - LOCATION: LOCATION: SACRUM

## 2024-01-05 LAB
ANION GAP SERPL CALCULATED.3IONS-SCNC: 4 MMOL/L (ref 3–16)
BASOPHILS # BLD: 0 K/UL (ref 0–0.2)
BASOPHILS NFR BLD: 0 %
BUN SERPL-MCNC: 54 MG/DL (ref 7–20)
CALCIUM SERPL-MCNC: 8.8 MG/DL (ref 8.3–10.6)
CHLORIDE SERPL-SCNC: 101 MMOL/L (ref 99–110)
CO2 SERPL-SCNC: 36 MMOL/L (ref 21–32)
CREAT SERPL-MCNC: 1.2 MG/DL (ref 0.8–1.3)
DEPRECATED RDW RBC AUTO: 23.8 % (ref 12.4–15.4)
EOSINOPHIL # BLD: 0.2 K/UL (ref 0–0.6)
EOSINOPHIL NFR BLD: 5 %
GFR SERPLBLD CREATININE-BSD FMLA CKD-EPI: 59 ML/MIN/{1.73_M2}
GLUCOSE BLD-MCNC: 199 MG/DL (ref 70–99)
GLUCOSE BLD-MCNC: 233 MG/DL (ref 70–99)
GLUCOSE BLD-MCNC: 253 MG/DL (ref 70–99)
GLUCOSE SERPL-MCNC: 162 MG/DL (ref 70–99)
HCT VFR BLD AUTO: 25.5 % (ref 40.5–52.5)
HGB BLD-MCNC: 7.9 G/DL (ref 13.5–17.5)
IRON SATN MFR SERPL: 25 % (ref 20–50)
IRON SATN MFR SERPL: 26 % (ref 20–50)
IRON SERPL-MCNC: 63 UG/DL (ref 59–158)
IRON SERPL-MCNC: 74 UG/DL (ref 59–158)
LYMPHOCYTES # BLD: 1.2 K/UL (ref 1–5.1)
LYMPHOCYTES NFR BLD: 36 %
MAGNESIUM SERPL-MCNC: 2.2 MG/DL (ref 1.8–2.4)
MCH RBC QN AUTO: 31.2 PG (ref 26–34)
MCHC RBC AUTO-ENTMCNC: 30.9 G/DL (ref 31–36)
MCV RBC AUTO: 101 FL (ref 80–100)
MONOCYTES # BLD: 0.1 K/UL (ref 0–1.3)
MONOCYTES NFR BLD: 2 %
NEUTROPHILS # BLD: 1.8 K/UL (ref 1.7–7.7)
NEUTROPHILS NFR BLD: 57 %
NT-PROBNP SERPL-MCNC: 2357 PG/ML (ref 0–449)
OVALOCYTES BLD QL SMEAR: ABNORMAL
PERFORMED ON: ABNORMAL
PLATELET # BLD AUTO: 128 K/UL (ref 135–450)
PLATELET BLD QL SMEAR: ABNORMAL
PMV BLD AUTO: 7.9 FL (ref 5–10.5)
POTASSIUM SERPL-SCNC: 3.9 MMOL/L (ref 3.5–5.1)
POTASSIUM SERPL-SCNC: 4.3 MMOL/L (ref 3.5–5.1)
POTASSIUM SERPL-SCNC: 5 MMOL/L (ref 3.5–5.1)
RBC # BLD AUTO: 2.52 M/UL (ref 4.2–5.9)
SLIDE REVIEW: ABNORMAL
SODIUM SERPL-SCNC: 141 MMOL/L (ref 136–145)
STOMATOCYTES BLD QL SMEAR: ABNORMAL
TIBC SERPL-MCNC: 254 UG/DL (ref 260–445)
TIBC SERPL-MCNC: 280 UG/DL (ref 260–445)
WBC # BLD AUTO: 3.2 K/UL (ref 4–11)

## 2024-01-05 PROCEDURE — 83880 ASSAY OF NATRIURETIC PEPTIDE: CPT

## 2024-01-05 PROCEDURE — 2140000000 HC CCU INTERMEDIATE R&B

## 2024-01-05 PROCEDURE — 83735 ASSAY OF MAGNESIUM: CPT

## 2024-01-05 PROCEDURE — 92526 ORAL FUNCTION THERAPY: CPT

## 2024-01-05 PROCEDURE — 83550 IRON BINDING TEST: CPT

## 2024-01-05 PROCEDURE — 6370000000 HC RX 637 (ALT 250 FOR IP): Performed by: INTERNAL MEDICINE

## 2024-01-05 PROCEDURE — 84132 ASSAY OF SERUM POTASSIUM: CPT

## 2024-01-05 PROCEDURE — 99232 SBSQ HOSP IP/OBS MODERATE 35: CPT | Performed by: STUDENT IN AN ORGANIZED HEALTH CARE EDUCATION/TRAINING PROGRAM

## 2024-01-05 PROCEDURE — 2580000003 HC RX 258: Performed by: INTERNAL MEDICINE

## 2024-01-05 PROCEDURE — 80048 BASIC METABOLIC PNL TOTAL CA: CPT

## 2024-01-05 PROCEDURE — 85025 COMPLETE CBC W/AUTO DIFF WBC: CPT

## 2024-01-05 PROCEDURE — 6360000002 HC RX W HCPCS: Performed by: INTERNAL MEDICINE

## 2024-01-05 PROCEDURE — 83540 ASSAY OF IRON: CPT

## 2024-01-05 RX ORDER — PANTOPRAZOLE SODIUM 40 MG/1
40 TABLET, DELAYED RELEASE ORAL
Status: DISCONTINUED | OUTPATIENT
Start: 2024-01-06 | End: 2024-01-05

## 2024-01-05 RX ORDER — PANTOPRAZOLE SODIUM 40 MG/1
40 TABLET, DELAYED RELEASE ORAL
Status: DISCONTINUED | OUTPATIENT
Start: 2024-01-05 | End: 2024-01-09 | Stop reason: HOSPADM

## 2024-01-05 RX ADMIN — GABAPENTIN 400 MG: 400 CAPSULE ORAL at 09:35

## 2024-01-05 RX ADMIN — PANTOPRAZOLE SODIUM 40 MG: 40 TABLET, DELAYED RELEASE ORAL at 17:40

## 2024-01-05 RX ADMIN — GABAPENTIN 400 MG: 400 CAPSULE ORAL at 20:34

## 2024-01-05 RX ADMIN — ACETAMINOPHEN 650 MG: 325 TABLET ORAL at 15:10

## 2024-01-05 RX ADMIN — APIXABAN 5 MG: 5 TABLET, FILM COATED ORAL at 09:35

## 2024-01-05 RX ADMIN — ACETAMINOPHEN 650 MG: 325 TABLET ORAL at 09:34

## 2024-01-05 RX ADMIN — GABAPENTIN 400 MG: 400 CAPSULE ORAL at 17:40

## 2024-01-05 RX ADMIN — DOXYCYCLINE HYCLATE 100 MG: 100 TABLET, COATED ORAL at 20:34

## 2024-01-05 RX ADMIN — SODIUM CHLORIDE, PRESERVATIVE FREE 10 ML: 5 INJECTION INTRAVENOUS at 20:35

## 2024-01-05 RX ADMIN — DOXYCYCLINE HYCLATE 100 MG: 100 TABLET, COATED ORAL at 09:34

## 2024-01-05 RX ADMIN — DOCUSATE SODIUM 100 MG: 100 CAPSULE, LIQUID FILLED ORAL at 20:34

## 2024-01-05 RX ADMIN — INSULIN GLARGINE 20 UNITS: 100 INJECTION, SOLUTION SUBCUTANEOUS at 20:34

## 2024-01-05 RX ADMIN — LEVOTHYROXINE SODIUM 175 MCG: 0.03 TABLET ORAL at 06:54

## 2024-01-05 RX ADMIN — SODIUM CHLORIDE, PRESERVATIVE FREE 10 ML: 5 INJECTION INTRAVENOUS at 09:35

## 2024-01-05 RX ADMIN — APIXABAN 5 MG: 5 TABLET, FILM COATED ORAL at 20:34

## 2024-01-05 RX ADMIN — GABAPENTIN 400 MG: 400 CAPSULE ORAL at 15:11

## 2024-01-05 RX ADMIN — INSULIN LISPRO 4 UNITS: 100 INJECTION, SOLUTION INTRAVENOUS; SUBCUTANEOUS at 15:10

## 2024-01-05 RX ADMIN — ALLOPURINOL 300 MG: 300 TABLET ORAL at 09:34

## 2024-01-05 RX ADMIN — FUROSEMIDE 5 MG/HR: 10 INJECTION, SOLUTION INTRAMUSCULAR; INTRAVENOUS at 15:45

## 2024-01-05 RX ADMIN — DOCUSATE SODIUM 100 MG: 100 CAPSULE, LIQUID FILLED ORAL at 09:35

## 2024-01-05 ASSESSMENT — PAIN DESCRIPTION - LOCATION
LOCATION: SCROTUM

## 2024-01-05 ASSESSMENT — PAIN SCALES - GENERAL
PAINLEVEL_OUTOF10: 6
PAINLEVEL_OUTOF10: 0
PAINLEVEL_OUTOF10: 0
PAINLEVEL_OUTOF10: 6
PAINLEVEL_OUTOF10: 0
PAINLEVEL_OUTOF10: 5

## 2024-01-05 ASSESSMENT — PAIN DESCRIPTION - DESCRIPTORS: DESCRIPTORS: SORE

## 2024-01-05 ASSESSMENT — PAIN - FUNCTIONAL ASSESSMENT: PAIN_FUNCTIONAL_ASSESSMENT: PREVENTS OR INTERFERES SOME ACTIVE ACTIVITIES AND ADLS

## 2024-01-06 LAB
ANION GAP SERPL CALCULATED.3IONS-SCNC: 5 MMOL/L (ref 3–16)
BASOPHILS # BLD: 0 K/UL (ref 0–0.2)
BASOPHILS NFR BLD: 0.4 %
BUN SERPL-MCNC: 51 MG/DL (ref 7–20)
CALCIUM SERPL-MCNC: 8.9 MG/DL (ref 8.3–10.6)
CHLORIDE SERPL-SCNC: 99 MMOL/L (ref 99–110)
CO2 SERPL-SCNC: 36 MMOL/L (ref 21–32)
CREAT SERPL-MCNC: 1.2 MG/DL (ref 0.8–1.3)
DEPRECATED RDW RBC AUTO: 24.4 % (ref 12.4–15.4)
EOSINOPHIL # BLD: 0 K/UL (ref 0–0.6)
EOSINOPHIL NFR BLD: 0.5 %
GFR SERPLBLD CREATININE-BSD FMLA CKD-EPI: 59 ML/MIN/{1.73_M2}
GLUCOSE BLD-MCNC: 193 MG/DL (ref 70–99)
GLUCOSE BLD-MCNC: 276 MG/DL (ref 70–99)
GLUCOSE BLD-MCNC: 300 MG/DL (ref 70–99)
GLUCOSE SERPL-MCNC: 133 MG/DL (ref 70–99)
HCT VFR BLD AUTO: 25.7 % (ref 40.5–52.5)
HGB BLD-MCNC: 8.1 G/DL (ref 13.5–17.5)
LYMPHOCYTES # BLD: 1.2 K/UL (ref 1–5.1)
LYMPHOCYTES NFR BLD: 37.5 %
MAGNESIUM SERPL-MCNC: 2.2 MG/DL (ref 1.8–2.4)
MCH RBC QN AUTO: 31.6 PG (ref 26–34)
MCHC RBC AUTO-ENTMCNC: 31.4 G/DL (ref 31–36)
MCV RBC AUTO: 100.6 FL (ref 80–100)
MONOCYTES # BLD: 0.1 K/UL (ref 0–1.3)
MONOCYTES NFR BLD: 3.8 %
NEUTROPHILS # BLD: 1.8 K/UL (ref 1.7–7.7)
NEUTROPHILS NFR BLD: 57.8 %
PERFORMED ON: ABNORMAL
PLATELET # BLD AUTO: 131 K/UL (ref 135–450)
PMV BLD AUTO: 8.3 FL (ref 5–10.5)
POTASSIUM SERPL-SCNC: 3.9 MMOL/L (ref 3.5–5.1)
POTASSIUM SERPL-SCNC: 3.9 MMOL/L (ref 3.5–5.1)
RBC # BLD AUTO: 2.55 M/UL (ref 4.2–5.9)
SODIUM SERPL-SCNC: 140 MMOL/L (ref 136–145)
WBC # BLD AUTO: 3.1 K/UL (ref 4–11)

## 2024-01-06 PROCEDURE — 6360000002 HC RX W HCPCS: Performed by: NURSE PRACTITIONER

## 2024-01-06 PROCEDURE — 80048 BASIC METABOLIC PNL TOTAL CA: CPT

## 2024-01-06 PROCEDURE — 99232 SBSQ HOSP IP/OBS MODERATE 35: CPT | Performed by: NURSE PRACTITIONER

## 2024-01-06 PROCEDURE — 83735 ASSAY OF MAGNESIUM: CPT

## 2024-01-06 PROCEDURE — 2140000000 HC CCU INTERMEDIATE R&B

## 2024-01-06 PROCEDURE — 2580000003 HC RX 258: Performed by: INTERNAL MEDICINE

## 2024-01-06 PROCEDURE — 6370000000 HC RX 637 (ALT 250 FOR IP): Performed by: INTERNAL MEDICINE

## 2024-01-06 PROCEDURE — 2580000003 HC RX 258: Performed by: NURSE PRACTITIONER

## 2024-01-06 PROCEDURE — 84132 ASSAY OF SERUM POTASSIUM: CPT

## 2024-01-06 PROCEDURE — 85025 COMPLETE CBC W/AUTO DIFF WBC: CPT

## 2024-01-06 PROCEDURE — 6360000002 HC RX W HCPCS: Performed by: INTERNAL MEDICINE

## 2024-01-06 RX ADMIN — SODIUM CHLORIDE 200 MG: 9 INJECTION, SOLUTION INTRAVENOUS at 17:39

## 2024-01-06 RX ADMIN — INSULIN LISPRO 4 UNITS: 100 INJECTION, SOLUTION INTRAVENOUS; SUBCUTANEOUS at 20:58

## 2024-01-06 RX ADMIN — FUROSEMIDE 5 MG/HR: 10 INJECTION, SOLUTION INTRAMUSCULAR; INTRAVENOUS at 19:27

## 2024-01-06 RX ADMIN — APIXABAN 5 MG: 5 TABLET, FILM COATED ORAL at 20:59

## 2024-01-06 RX ADMIN — ALLOPURINOL 300 MG: 300 TABLET ORAL at 08:30

## 2024-01-06 RX ADMIN — DOCUSATE SODIUM 100 MG: 100 CAPSULE, LIQUID FILLED ORAL at 08:30

## 2024-01-06 RX ADMIN — DOXYCYCLINE HYCLATE 100 MG: 100 TABLET, COATED ORAL at 08:30

## 2024-01-06 RX ADMIN — SODIUM CHLORIDE, PRESERVATIVE FREE 10 ML: 5 INJECTION INTRAVENOUS at 08:31

## 2024-01-06 RX ADMIN — LEVOTHYROXINE SODIUM 175 MCG: 0.03 TABLET ORAL at 06:07

## 2024-01-06 RX ADMIN — INSULIN LISPRO 4 UNITS: 100 INJECTION, SOLUTION INTRAVENOUS; SUBCUTANEOUS at 16:55

## 2024-01-06 RX ADMIN — DOXYCYCLINE HYCLATE 100 MG: 100 TABLET, COATED ORAL at 20:59

## 2024-01-06 RX ADMIN — DOCUSATE SODIUM 100 MG: 100 CAPSULE, LIQUID FILLED ORAL at 20:59

## 2024-01-06 RX ADMIN — POLYETHYLENE GLYCOL 3350 17 G: 17 POWDER, FOR SOLUTION ORAL at 08:29

## 2024-01-06 RX ADMIN — ACETAMINOPHEN 650 MG: 325 TABLET ORAL at 16:55

## 2024-01-06 RX ADMIN — SODIUM CHLORIDE, PRESERVATIVE FREE 10 ML: 5 INJECTION INTRAVENOUS at 20:59

## 2024-01-06 RX ADMIN — INSULIN GLARGINE 20 UNITS: 100 INJECTION, SOLUTION SUBCUTANEOUS at 20:58

## 2024-01-06 RX ADMIN — GABAPENTIN 400 MG: 400 CAPSULE ORAL at 08:30

## 2024-01-06 RX ADMIN — ACETAMINOPHEN 650 MG: 325 TABLET ORAL at 10:32

## 2024-01-06 RX ADMIN — GABAPENTIN 400 MG: 400 CAPSULE ORAL at 16:55

## 2024-01-06 RX ADMIN — PANTOPRAZOLE SODIUM 40 MG: 40 TABLET, DELAYED RELEASE ORAL at 06:07

## 2024-01-06 RX ADMIN — GABAPENTIN 400 MG: 400 CAPSULE ORAL at 20:59

## 2024-01-06 RX ADMIN — APIXABAN 5 MG: 5 TABLET, FILM COATED ORAL at 08:30

## 2024-01-06 ASSESSMENT — PAIN DESCRIPTION - LOCATION: LOCATION: GENERALIZED

## 2024-01-06 ASSESSMENT — PAIN SCALES - GENERAL: PAINLEVEL_OUTOF10: 6

## 2024-01-06 ASSESSMENT — PAIN DESCRIPTION - DESCRIPTORS: DESCRIPTORS: ACHING

## 2024-01-06 NOTE — RT PROTOCOL NOTE
RT Inhaler-Nebulizer Bronchodilator Protocol Note    There is a bronchodilator order in the chart from a provider indicating to follow the RT Bronchodilator Protocol and there is an “Initiate RT Inhaler-Nebulizer Bronchodilator Protocol” order as well (see protocol at bottom of note).    CXR Findings:  No results found.    The findings from the last RT Protocol Assessment were as follows:   History Pulmonary Disease: None or smoker <15 pack years  Respiratory Pattern: Regular pattern and RR 12-20 bpm  Breath Sounds: Clear breath sounds  Cough: Strong, spontaneous, non-productive  Indication for Bronchodilator Therapy: On home bronchodilators  Bronchodilator Assessment Score: 0    Aerosolized bronchodilator medication orders have been revised according to the RT Inhaler-Nebulizer Bronchodilator Protocol below.    Respiratory Therapist to perform RT Therapy Protocol Assessment initially then follow the protocol.  Repeat RT Therapy Protocol Assessment PRN for score 0-3 or on second treatment, BID, and PRN for scores above 3.    No Indications - adjust the frequency to every 6 hours PRN wheezing or bronchospasm, if no treatments needed after 48 hours then discontinue using Per Protocol order mode.     If indication present, adjust the RT bronchodilator orders based on the Bronchodilator Assessment Score as indicated below.  Use Inhaler orders unless patient has one or more of the following: on home nebulizer, not able to hold breath for 10 seconds, is not alert and oriented, cannot activate and use MDI correctly, or respiratory rate 25 breaths per minute or more, then use the equivalent nebulizer order(s) with same Frequency and PRN reasons based on the score.  If a patient is on this medication at home then do not decrease Frequency below that used at home.    0-3 - enter or revise RT bronchodilator order(s) to equivalent RT Bronchodilator order with Frequency of every 4 hours PRN for wheezing or increased work of

## 2024-01-07 LAB
ANION GAP SERPL CALCULATED.3IONS-SCNC: 7 MMOL/L (ref 3–16)
BASOPHILS # BLD: 0 K/UL (ref 0–0.2)
BASOPHILS NFR BLD: 0.7 %
BUN SERPL-MCNC: 49 MG/DL (ref 7–20)
CALCIUM SERPL-MCNC: 9 MG/DL (ref 8.3–10.6)
CHLORIDE SERPL-SCNC: 98 MMOL/L (ref 99–110)
CO2 SERPL-SCNC: 35 MMOL/L (ref 21–32)
CREAT SERPL-MCNC: 1.4 MG/DL (ref 0.8–1.3)
DEPRECATED RDW RBC AUTO: 23.9 % (ref 12.4–15.4)
EOSINOPHIL # BLD: 0 K/UL (ref 0–0.6)
EOSINOPHIL NFR BLD: 0.6 %
GFR SERPLBLD CREATININE-BSD FMLA CKD-EPI: 49 ML/MIN/{1.73_M2}
GLUCOSE BLD-MCNC: 224 MG/DL (ref 70–99)
GLUCOSE BLD-MCNC: 278 MG/DL (ref 70–99)
GLUCOSE SERPL-MCNC: 122 MG/DL (ref 70–99)
HCT VFR BLD AUTO: 26.3 % (ref 40.5–52.5)
HGB BLD-MCNC: 8.4 G/DL (ref 13.5–17.5)
LYMPHOCYTES # BLD: 1.4 K/UL (ref 1–5.1)
LYMPHOCYTES NFR BLD: 44.7 %
MAGNESIUM SERPL-MCNC: 2.1 MG/DL (ref 1.8–2.4)
MCH RBC QN AUTO: 32.1 PG (ref 26–34)
MCHC RBC AUTO-ENTMCNC: 31.8 G/DL (ref 31–36)
MCV RBC AUTO: 101 FL (ref 80–100)
MONOCYTES # BLD: 0.1 K/UL (ref 0–1.3)
MONOCYTES NFR BLD: 3.8 %
NEUTROPHILS # BLD: 1.6 K/UL (ref 1.7–7.7)
NEUTROPHILS NFR BLD: 50.2 %
PERFORMED ON: ABNORMAL
PERFORMED ON: ABNORMAL
PLATELET # BLD AUTO: 127 K/UL (ref 135–450)
PMV BLD AUTO: 7.6 FL (ref 5–10.5)
POTASSIUM SERPL-SCNC: 4.2 MMOL/L (ref 3.5–5.1)
POTASSIUM SERPL-SCNC: 4.5 MMOL/L (ref 3.5–5.1)
RBC # BLD AUTO: 2.6 M/UL (ref 4.2–5.9)
SODIUM SERPL-SCNC: 140 MMOL/L (ref 136–145)
WBC # BLD AUTO: 3.2 K/UL (ref 4–11)

## 2024-01-07 PROCEDURE — 6360000002 HC RX W HCPCS: Performed by: INTERNAL MEDICINE

## 2024-01-07 PROCEDURE — 99232 SBSQ HOSP IP/OBS MODERATE 35: CPT | Performed by: NURSE PRACTITIONER

## 2024-01-07 PROCEDURE — 6360000002 HC RX W HCPCS: Performed by: NURSE PRACTITIONER

## 2024-01-07 PROCEDURE — 2580000003 HC RX 258: Performed by: NURSE PRACTITIONER

## 2024-01-07 PROCEDURE — 2580000003 HC RX 258: Performed by: INTERNAL MEDICINE

## 2024-01-07 PROCEDURE — 80048 BASIC METABOLIC PNL TOTAL CA: CPT

## 2024-01-07 PROCEDURE — 85025 COMPLETE CBC W/AUTO DIFF WBC: CPT

## 2024-01-07 PROCEDURE — 6370000000 HC RX 637 (ALT 250 FOR IP): Performed by: INTERNAL MEDICINE

## 2024-01-07 PROCEDURE — 84132 ASSAY OF SERUM POTASSIUM: CPT

## 2024-01-07 PROCEDURE — 2140000000 HC CCU INTERMEDIATE R&B

## 2024-01-07 PROCEDURE — 83735 ASSAY OF MAGNESIUM: CPT

## 2024-01-07 RX ADMIN — DOXYCYCLINE HYCLATE 100 MG: 100 TABLET, COATED ORAL at 08:50

## 2024-01-07 RX ADMIN — ACETAMINOPHEN 650 MG: 325 TABLET ORAL at 17:32

## 2024-01-07 RX ADMIN — DOCUSATE SODIUM 100 MG: 100 CAPSULE, LIQUID FILLED ORAL at 20:50

## 2024-01-07 RX ADMIN — ACETAMINOPHEN 650 MG: 325 TABLET ORAL at 11:45

## 2024-01-07 RX ADMIN — INSULIN LISPRO 2 UNITS: 100 INJECTION, SOLUTION INTRAVENOUS; SUBCUTANEOUS at 17:31

## 2024-01-07 RX ADMIN — PANTOPRAZOLE SODIUM 40 MG: 40 TABLET, DELAYED RELEASE ORAL at 06:29

## 2024-01-07 RX ADMIN — FUROSEMIDE 5 MG/HR: 10 INJECTION, SOLUTION INTRAMUSCULAR; INTRAVENOUS at 09:56

## 2024-01-07 RX ADMIN — APIXABAN 5 MG: 5 TABLET, FILM COATED ORAL at 20:50

## 2024-01-07 RX ADMIN — INSULIN GLARGINE 20 UNITS: 100 INJECTION, SOLUTION SUBCUTANEOUS at 20:50

## 2024-01-07 RX ADMIN — GABAPENTIN 400 MG: 400 CAPSULE ORAL at 13:00

## 2024-01-07 RX ADMIN — ALLOPURINOL 300 MG: 300 TABLET ORAL at 08:50

## 2024-01-07 RX ADMIN — APIXABAN 5 MG: 5 TABLET, FILM COATED ORAL at 08:50

## 2024-01-07 RX ADMIN — SODIUM CHLORIDE 200 MG: 9 INJECTION, SOLUTION INTRAVENOUS at 16:05

## 2024-01-07 RX ADMIN — ACETAMINOPHEN 650 MG: 325 TABLET ORAL at 06:27

## 2024-01-07 RX ADMIN — GABAPENTIN 400 MG: 400 CAPSULE ORAL at 08:50

## 2024-01-07 RX ADMIN — GABAPENTIN 400 MG: 400 CAPSULE ORAL at 17:32

## 2024-01-07 RX ADMIN — LEVOTHYROXINE SODIUM 175 MCG: 0.03 TABLET ORAL at 06:28

## 2024-01-07 RX ADMIN — GABAPENTIN 400 MG: 400 CAPSULE ORAL at 20:50

## 2024-01-07 RX ADMIN — DOCUSATE SODIUM 100 MG: 100 CAPSULE, LIQUID FILLED ORAL at 08:50

## 2024-01-07 RX ADMIN — DOXYCYCLINE HYCLATE 100 MG: 100 TABLET, COATED ORAL at 20:50

## 2024-01-07 ASSESSMENT — PAIN DESCRIPTION - LOCATION: LOCATION: BACK

## 2024-01-07 ASSESSMENT — PAIN SCALES - GENERAL: PAINLEVEL_OUTOF10: 5

## 2024-01-08 LAB
BASOPHILS # BLD: 0 K/UL (ref 0–0.2)
BASOPHILS NFR BLD: 0.6 %
DEPRECATED RDW RBC AUTO: 24.9 % (ref 12.4–15.4)
EOSINOPHIL # BLD: 0 K/UL (ref 0–0.6)
EOSINOPHIL NFR BLD: 0.9 %
GLUCOSE BLD-MCNC: 100 MG/DL (ref 70–99)
GLUCOSE BLD-MCNC: 205 MG/DL (ref 70–99)
GLUCOSE BLD-MCNC: 308 MG/DL (ref 70–99)
HCT VFR BLD AUTO: 25.5 % (ref 40.5–52.5)
HGB BLD-MCNC: 8.1 G/DL (ref 13.5–17.5)
LYMPHOCYTES # BLD: 1.1 K/UL (ref 1–5.1)
LYMPHOCYTES NFR BLD: 39.4 %
MCH RBC QN AUTO: 32.1 PG (ref 26–34)
MCHC RBC AUTO-ENTMCNC: 31.7 G/DL (ref 31–36)
MCV RBC AUTO: 101.2 FL (ref 80–100)
MONOCYTES # BLD: 0.1 K/UL (ref 0–1.3)
MONOCYTES NFR BLD: 5 %
NEUTROPHILS # BLD: 1.5 K/UL (ref 1.7–7.7)
NEUTROPHILS NFR BLD: 54.1 %
NT-PROBNP SERPL-MCNC: 2813 PG/ML (ref 0–449)
PERFORMED ON: ABNORMAL
PLATELET # BLD AUTO: 159 K/UL (ref 135–450)
PMV BLD AUTO: 9.7 FL (ref 5–10.5)
POTASSIUM SERPL-SCNC: 4.5 MMOL/L (ref 3.5–5.1)
POTASSIUM SERPL-SCNC: 4.9 MMOL/L (ref 3.5–5.1)
RBC # BLD AUTO: 2.52 M/UL (ref 4.2–5.9)
REASON FOR REJECTION: NORMAL
REJECTED TEST: NORMAL
WBC # BLD AUTO: 2.8 K/UL (ref 4–11)

## 2024-01-08 PROCEDURE — 2580000003 HC RX 258: Performed by: NURSE PRACTITIONER

## 2024-01-08 PROCEDURE — 94640 AIRWAY INHALATION TREATMENT: CPT

## 2024-01-08 PROCEDURE — 94761 N-INVAS EAR/PLS OXIMETRY MLT: CPT

## 2024-01-08 PROCEDURE — 2580000003 HC RX 258: Performed by: INTERNAL MEDICINE

## 2024-01-08 PROCEDURE — 6360000002 HC RX W HCPCS: Performed by: INTERNAL MEDICINE

## 2024-01-08 PROCEDURE — 97535 SELF CARE MNGMENT TRAINING: CPT

## 2024-01-08 PROCEDURE — 99232 SBSQ HOSP IP/OBS MODERATE 35: CPT | Performed by: NURSE PRACTITIONER

## 2024-01-08 PROCEDURE — 6360000002 HC RX W HCPCS: Performed by: NURSE PRACTITIONER

## 2024-01-08 PROCEDURE — 97110 THERAPEUTIC EXERCISES: CPT

## 2024-01-08 PROCEDURE — 2140000000 HC CCU INTERMEDIATE R&B

## 2024-01-08 PROCEDURE — 36415 COLL VENOUS BLD VENIPUNCTURE: CPT

## 2024-01-08 PROCEDURE — 85025 COMPLETE CBC W/AUTO DIFF WBC: CPT

## 2024-01-08 PROCEDURE — 84132 ASSAY OF SERUM POTASSIUM: CPT

## 2024-01-08 PROCEDURE — 92526 ORAL FUNCTION THERAPY: CPT

## 2024-01-08 PROCEDURE — 97116 GAIT TRAINING THERAPY: CPT

## 2024-01-08 PROCEDURE — 83880 ASSAY OF NATRIURETIC PEPTIDE: CPT

## 2024-01-08 PROCEDURE — 97530 THERAPEUTIC ACTIVITIES: CPT

## 2024-01-08 PROCEDURE — 6370000000 HC RX 637 (ALT 250 FOR IP): Performed by: INTERNAL MEDICINE

## 2024-01-08 RX ORDER — INSULIN LISPRO 100 [IU]/ML
0-16 INJECTION, SOLUTION INTRAVENOUS; SUBCUTANEOUS
Status: DISCONTINUED | OUTPATIENT
Start: 2024-01-08 | End: 2024-01-09 | Stop reason: HOSPADM

## 2024-01-08 RX ORDER — INSULIN LISPRO 100 [IU]/ML
0-4 INJECTION, SOLUTION INTRAVENOUS; SUBCUTANEOUS NIGHTLY
Status: DISCONTINUED | OUTPATIENT
Start: 2024-01-08 | End: 2024-01-09 | Stop reason: HOSPADM

## 2024-01-08 RX ADMIN — GABAPENTIN 400 MG: 400 CAPSULE ORAL at 12:16

## 2024-01-08 RX ADMIN — APIXABAN 5 MG: 5 TABLET, FILM COATED ORAL at 20:52

## 2024-01-08 RX ADMIN — PANTOPRAZOLE SODIUM 40 MG: 40 TABLET, DELAYED RELEASE ORAL at 07:17

## 2024-01-08 RX ADMIN — FUROSEMIDE 5 MG/HR: 10 INJECTION, SOLUTION INTRAMUSCULAR; INTRAVENOUS at 09:31

## 2024-01-08 RX ADMIN — GABAPENTIN 400 MG: 400 CAPSULE ORAL at 20:52

## 2024-01-08 RX ADMIN — DOXYCYCLINE HYCLATE 100 MG: 100 TABLET, COATED ORAL at 20:52

## 2024-01-08 RX ADMIN — ALLOPURINOL 300 MG: 300 TABLET ORAL at 08:49

## 2024-01-08 RX ADMIN — LEVOTHYROXINE SODIUM 175 MCG: 0.03 TABLET ORAL at 07:17

## 2024-01-08 RX ADMIN — ACETAMINOPHEN 650 MG: 325 TABLET ORAL at 09:35

## 2024-01-08 RX ADMIN — DOCUSATE SODIUM 100 MG: 100 CAPSULE, LIQUID FILLED ORAL at 20:51

## 2024-01-08 RX ADMIN — SODIUM CHLORIDE 200 MG: 9 INJECTION, SOLUTION INTRAVENOUS at 15:17

## 2024-01-08 RX ADMIN — INSULIN LISPRO 4 UNITS: 100 INJECTION, SOLUTION INTRAVENOUS; SUBCUTANEOUS at 12:16

## 2024-01-08 RX ADMIN — DOXYCYCLINE HYCLATE 100 MG: 100 TABLET, COATED ORAL at 08:49

## 2024-01-08 RX ADMIN — SODIUM CHLORIDE, PRESERVATIVE FREE 10 ML: 5 INJECTION INTRAVENOUS at 20:53

## 2024-01-08 RX ADMIN — INSULIN LISPRO 4 UNITS: 100 INJECTION, SOLUTION INTRAVENOUS; SUBCUTANEOUS at 20:51

## 2024-01-08 RX ADMIN — GABAPENTIN 400 MG: 400 CAPSULE ORAL at 08:49

## 2024-01-08 RX ADMIN — APIXABAN 5 MG: 5 TABLET, FILM COATED ORAL at 08:52

## 2024-01-08 RX ADMIN — INSULIN GLARGINE 20 UNITS: 100 INJECTION, SOLUTION SUBCUTANEOUS at 20:51

## 2024-01-08 RX ADMIN — ALBUTEROL SULFATE 2.5 MG: 2.5 SOLUTION RESPIRATORY (INHALATION) at 19:54

## 2024-01-08 RX ADMIN — DOCUSATE SODIUM 100 MG: 100 CAPSULE, LIQUID FILLED ORAL at 08:49

## 2024-01-08 ASSESSMENT — PAIN DESCRIPTION - DESCRIPTORS: DESCRIPTORS: DISCOMFORT

## 2024-01-08 ASSESSMENT — PAIN DESCRIPTION - ORIENTATION: ORIENTATION: LEFT

## 2024-01-08 ASSESSMENT — PAIN DESCRIPTION - LOCATION: LOCATION: HIP;KNEE

## 2024-01-08 ASSESSMENT — PAIN SCALES - GENERAL: PAINLEVEL_OUTOF10: 3

## 2024-01-08 NOTE — DISCHARGE INSTR - COC
Continuity of Care Form  HF Pathway  Systolic/Diastolic    EF***    _x_ Daily Visits x 3     _x_Cardiovascular Assessment. Titrate O2 to keep SaO2 greater than 90%    _x_ Daily Weights- Baseline Wt:***   Call MD if:   3 pound weight gain or loss in one day OR 5 pound weight gain in one week    _x_No added salt diet (3-4 G sodium) and 64 oz fluid restriction     _x_ Labs:   BMP,BNP     Please start on 24   Frequency: Weekly x 4              Fax results to: [facility]F Clinic: [phone]        _x_ Med List attached:   Hold Coreg/Metoprolol if HR less than 45 or patient symptomatic*   Hold ACE/ARB if SBP less than 85 or patient symptomatic*   Do not hold Spironolactone (aldactone) for hypotension/bradycardia   Call MD for questions: CHF Clinic: [phone]    _x_Follow up appointment with cardiology: date/time:  with Ольга Cheng         Patient Name: Brady Hernandez   :  1940  MRN:  [id number]    Admit date:  2024  Discharge date:      Code Status Order: Limited   Advance Directives:     Admitting Physician:  Nino Portillo MD  PCP: Kaden Pineda    Discharging Nurse: Elzbieta  Discharging Hospital Unit/Room#: CVU-2908/2908-01  Discharging Unit Phone Number: [phone]    Emergency Contact:   Extended Emergency Contact Information  Primary Emergency Contact: cinthya hernandez  [place] Phone: [phone]  Relation: Spouse    Past Surgical History:  Past Surgical History:   Procedure Laterality Date    CAROTID ENDARTERECTOMY      CORONARY ARTERY BYPASS GRAFT      FOOT SURGERY Left     JOINT REPLACEMENT      KNEE ARTHROTOMY Left 2023    LEFT KNEE IRRIGATION AND DEBRIDEMENT-ADDUCTOR BLOCK; CHERRY performed by Fer Alejandro MD at [facility] OR    REVISION TOTAL KNEE ARTHROPLASTY Left 2022    REVISION LEFT TOTAL KNEE ARTHROPLASTY-CHERRY; ADDUCTOR BLOCK performed by Fer Alejandro MD at [facility] OR    REVISION TOTAL KNEE ARTHROPLASTY Left 2023    LEFT KNEE POLYETHLENE EXCHANGE

## 2024-01-08 NOTE — CONSULTS
Urology Consult Note  [facility]     Patient: Brady Hernandez MRN: [id number]  Room/Bed: [facility]-2908/2908-01   YOB: 1940  Age/Sex: 84 y.o.male  Admission Date: 1/2/2024     Date of Service:  1/6/2024    Consulting Physician: Shane Katz MD  Admitting/Requesting Physician: Nino Portillo MD  Primary Care Physician: Kaden Pineda    Reason for Consult: Scrotal edema    ASSESSMENT/PLAN     Scrotal Swelling in setting of congestive heart failure  Bilat lower extremity swelling  Exam shows diffuse scrotal swelling, no signs of infection-similar to prior presentation  Horne catheter in place with clear yellow urine, buried penis with penile edema as well  PMH CAD, CABG, HTN, KAREN, HFpEF admitted with anasarca and SOB, diuresed about 10L on Milrinone and lasix gtt.       Recommendations:  Elevate the scrotum--discussed with patient and nurse  Monitor for infection  Will follow    All patient questions were answered. He understands the plan as listed above.    HISTORY     Chief Complaint:   Chief Complaint   Patient presents with    Groin Swelling     Pt presents to the ER complaining of swelling in the scrotum and legs. Pt was admitted for the same one week ago. Pt was dx with CHF. Pt is hypotensive on arrival also. Pt denies any CP, pt does state he has SOB on occasion. Pt describes decreased activity and ability to walk due to weakness and SOB.        History of Present Illness: Brady Hernandez is a 84 y.o. male with scrotal edema. Onset of symptoms was gradual with worsening course since that time. Symptoms are aggravated by worsening heart failure. Symptoms improved with previously elevation. Associated symptoms include scrotal pain minimal. Patient also reports no other urologic history. He has tried the following treatments: Nothing    Past Medical History:  He has a past medical history of Atrial fibrillation (HCC), CAD (coronary artery disease), Class 2 obesity, Coronary 
  [facility]    2024    Brady Hernandez (:  1940) is a 84 y.o. male    Referring Provider: Kaden Pineda    HISTORY:  84M hx HFpEF, CAD s/p CABG >20 years ago at [place], HTN, KAREN, Chronic edema, DM w neuropathy presenting for anasarca, scrotal edema, and dyspnea. He was actually admitted for this right before dk, was diuresed for 1-2 days and left to go home for the holidays. In talking with his family, it sounds though that this dates back to months prior. He has been seeing multiple specialists for knee/leg swelling, was told it was likely related to knee replacement, cellulitis, lymphedema. His primary cardiologist had escalated doses of torsemide from 5 mg, to 10 mg, to 20 mg daily. When hospitalized 2 weeks ago, ECHO showed dilated IVC (non-collapsible) and Grade 3 DD w normal EF. He states he did diurese well, but after going home had no effect at all with Torsemide. His family says he can hardly walk around and do anything due to ongoing leg heaviness and pain. He came back yesterday and was started on Milrinone and lasix drips and having great UOP.     Troponin was noted to be elevated, alongside significant anemia (not new, but worse than prior <8), and with JHON. I had a long discussion regarding goals of care with the patient, his wife, his daughter, and his son (on the phone). We discussed need for likely days of diuresis. We also discussed his elevated troponin, including etiologies and possible further workup. He has no chest pain and has not had any chest pain at all. His only symptomatic limitation is leg pain. He is currently a limited code. We discussed that a left heart catheterization is something that could be done and reasons to do this. We discussed hesitations to proceed with this, including ongoing anemia and CKD with known risks of making either of those problems worse. We discussed possible stress testing, which they are interested in but also 
[facility]  HEART FAILURE PROGRAM      Brady Hernandez 1940    History:  Past Medical History:   Diagnosis Date    Atrial fibrillation (HCC)     CAD (coronary artery disease)     Class 2 obesity     Coronary artery disease     Diabetes mellitus (HCC)     Hyperlipidemia     Hypertension     Hypothyroidism     Left ventricular diastolic dysfunction     Osteoarthritis     Right ventricular systolic heart failure        ECHO:  12/23/23  Summary   Definity was administered for better endocardial definition.   Left ventricular cavity size is normal with mild to moderate concentric left   ventricular hypertrophy. Ejection fraction is visually estimated to be 65%.   Cannot determine wall motion due to abnormal arrhythmia. Grade III diastolic   dysfunction with elevated LV filling pressures. E/e' = 17.7.   Left atrium is dilated.   Right atrium is dilated.   Right coronary cusp appears fixed.   Mitral annular calcification is present.   Mild tricuspid regurgitation.   Right ventricle is dilated with reduced function by TAPSE: 1.41 cm. RVS   velocity: 10.6 cm/s.   Aortic root is dilated. 3.6 cm.   Ascending aorta is dilated. 4.2 cm.   Trivial left pericardial effusion.   IVC size is dilated (>2.1 cm) and collapses < 50% with respiration   consistent with elevated RA pressure (15 mmHg).   Estimated pulmonary artery systolic pressure is elevated at 53 mmHg assuming   a right atrial pressure of 15 mmHg.         History of sleep apnea: No    [place] Screen ordered: Yes    DM History: Yes  Consult for DM educator: No      Last Hospital Admission: 12/22-12/24 with scrotal swelling  Code Status: limited code  Discharge plans: from home    Family Present: no, phoned wife for part of conversation    Brady Hernandez was admitted to the hospital with increased edema. Patient states HF is a new diagnosis. He follows with Dr. Wheat at [facility]. He states he was experiencing lower leg edema, scrotal swelling, 
Palliative Care:  a 84 y.o. male with chronic diastolic CHF, CKD 3, Afib, CAD, DM 2 came to ER with complaints of scrotal and LE edema. Patient was admitted before [place] and discharged to home. He continued to have scrotal and LE edema. States Horne could not be placed on last admission. No CP, HA or fevers.  Hypotensive in ED. Admitted 1/2/24 for further treatment/evaluation. Palliative consult placed 1/7/24 as patient is a readmission/discharge was on 12/24/23.          Recent Labs     01/05/24  0423 01/06/24  0449 01/07/24  0430   WBC 3.2* 3.1* 3.2*   HGB 7.9* 8.1* 8.4*   * 131* 127*         BMP:            Recent Labs     01/05/24 0423 01/05/24  1600 01/06/24 0449 01/06/24  1645 01/07/24  0430     --  140  --  140   K 3.9   < > 3.9 3.9 4.5     --  99  --  98*   CO2 36*  --  36*  --  35*   BUN 54*  --  51*  --  49*   CREATININE 1.2  --  1.2  --  1.4*   GLUCOSE 162*  --  133*  --  122*       Past Medical History:   has a past medical history of Atrial fibrillation (HCC), CAD (coronary artery disease), Class 2 obesity, Coronary artery disease, Diabetes mellitus (HCC), Hyperlipidemia, Hypertension, Hypothyroidism, Left ventricular diastolic dysfunction, Osteoarthritis, and Right ventricular systolic heart failure.    Past Surgical History:   has a past surgical history that includes Carotid endarterectomy; sinus surgery; joint replacement; Foot surgery (Left); Revision total knee arthroplasty (Left, 07/14/2022); Knee Arthrotomy (Left, 03/09/2023); Revision total knee arthroplasty (Left, 03/09/2023); Coronary artery bypass graft; and Spine surgery.    Advance Directives:    LIMITED. Confirmed with Dr. Portillo 1/2/24. No ACP on file.  Wife (Pat) DPOA if/when indicated.     Problem Severity: Pain/Other Symptoms:   Acute on chronic diastolic CHF; improving with approx. 18 lbs decrease in WT from Admission  Recent ECHO showed normal EF, dilated IVC (non-collapsible) and Grade 3 DD. Cardiology on 
  Drainage Amount Small (< 25%) 01/05/24 1016   Drainage Description Serosanguinous 01/05/24 1016   Melinda-wound Assessment Dry/flaky 01/05/24 1016   Number of days: 2         ASSESSMENT AND PLAN:  -Multiple traumatic bulla bilateral feet, severe edema    Bulla will be left alone and allowed to dry out to be painted with Betadine these are currently protecting his wounds and they are stable.  I believe it'll be more beneficial to have the wounds with the bulla then opened Wounds that may bleed with bandage changes on multiple digits.  Discussed with patient and family to avoid trauma that causes these.  Recommended leg elevations at all times when not active.  Patient to follow Outpatient in the wound care center Or with his podiatrist.      DISPO:Recommend leaving the Blood-filled blisters intact and bandaged to 4th toe open wound. Okay for discharge from podiatric perspective with follow-up with his regular podiatrist    Thanks for the opportunity to participate in this patient's care.     Yan Amin DPM

## 2024-01-09 VITALS
OXYGEN SATURATION: 94 % | SYSTOLIC BLOOD PRESSURE: 124 MMHG | HEART RATE: 70 BPM | DIASTOLIC BLOOD PRESSURE: 61 MMHG | HEIGHT: 72 IN | WEIGHT: 259.1 LBS | TEMPERATURE: 97.9 F | BODY MASS INDEX: 35.1 KG/M2 | RESPIRATION RATE: 18 BRPM

## 2024-01-09 LAB
ANION GAP SERPL CALCULATED.3IONS-SCNC: 6 MMOL/L (ref 3–16)
BUN SERPL-MCNC: 41 MG/DL (ref 7–20)
CALCIUM SERPL-MCNC: 8.7 MG/DL (ref 8.3–10.6)
CHLORIDE SERPL-SCNC: 99 MMOL/L (ref 99–110)
CO2 SERPL-SCNC: 37 MMOL/L (ref 21–32)
CREAT SERPL-MCNC: 1.2 MG/DL (ref 0.8–1.3)
GFR SERPLBLD CREATININE-BSD FMLA CKD-EPI: 59 ML/MIN/{1.73_M2}
GLUCOSE BLD-MCNC: 221 MG/DL (ref 70–99)
GLUCOSE BLD-MCNC: 276 MG/DL (ref 70–99)
GLUCOSE SERPL-MCNC: 195 MG/DL (ref 70–99)
PERFORMED ON: ABNORMAL
PERFORMED ON: ABNORMAL
POTASSIUM SERPL-SCNC: 4.1 MMOL/L (ref 3.5–5.1)
SODIUM SERPL-SCNC: 142 MMOL/L (ref 136–145)

## 2024-01-09 PROCEDURE — 6370000000 HC RX 637 (ALT 250 FOR IP): Performed by: INTERNAL MEDICINE

## 2024-01-09 PROCEDURE — 6370000000 HC RX 637 (ALT 250 FOR IP): Performed by: NURSE PRACTITIONER

## 2024-01-09 PROCEDURE — 80048 BASIC METABOLIC PNL TOTAL CA: CPT

## 2024-01-09 PROCEDURE — 99232 SBSQ HOSP IP/OBS MODERATE 35: CPT | Performed by: NURSE PRACTITIONER

## 2024-01-09 RX ORDER — LOSARTAN POTASSIUM 50 MG/1
25 TABLET ORAL DAILY
Qty: 30 TABLET | Refills: 1 | Status: SHIPPED | OUTPATIENT
Start: 2024-01-09 | End: 2024-01-09 | Stop reason: HOSPADM

## 2024-01-09 RX ORDER — LOSARTAN POTASSIUM 25 MG/1
25 TABLET ORAL DAILY
Status: DISCONTINUED | OUTPATIENT
Start: 2024-01-10 | End: 2024-01-09 | Stop reason: HOSPADM

## 2024-01-09 RX ORDER — LOSARTAN POTASSIUM 25 MG/1
25 TABLET ORAL DAILY
Qty: 30 TABLET | Refills: 1 | Status: SHIPPED | OUTPATIENT
Start: 2024-01-10 | End: 2024-01-09

## 2024-01-09 RX ORDER — LOSARTAN POTASSIUM 25 MG/1
50 TABLET ORAL DAILY
Status: DISCONTINUED | OUTPATIENT
Start: 2024-01-09 | End: 2024-01-09

## 2024-01-09 RX ORDER — INSULIN GLARGINE 100 [IU]/ML
25 INJECTION, SOLUTION SUBCUTANEOUS NIGHTLY
Status: DISCONTINUED | OUTPATIENT
Start: 2024-01-09 | End: 2024-01-09 | Stop reason: HOSPADM

## 2024-01-09 RX ORDER — TORSEMIDE 20 MG/1
40 TABLET ORAL DAILY
Status: DISCONTINUED | OUTPATIENT
Start: 2024-01-09 | End: 2024-01-09 | Stop reason: HOSPADM

## 2024-01-09 RX ORDER — LOSARTAN POTASSIUM 25 MG/1
25 TABLET ORAL DAILY
Qty: 30 TABLET | Refills: 1 | Status: SHIPPED | OUTPATIENT
Start: 2024-01-10

## 2024-01-09 RX ADMIN — INSULIN LISPRO 4 UNITS: 100 INJECTION, SOLUTION INTRAVENOUS; SUBCUTANEOUS at 09:29

## 2024-01-09 RX ADMIN — LOSARTAN POTASSIUM 50 MG: 25 TABLET, FILM COATED ORAL at 12:32

## 2024-01-09 RX ADMIN — APIXABAN 5 MG: 5 TABLET, FILM COATED ORAL at 09:29

## 2024-01-09 RX ADMIN — PANTOPRAZOLE SODIUM 40 MG: 40 TABLET, DELAYED RELEASE ORAL at 06:13

## 2024-01-09 RX ADMIN — INSULIN LISPRO 8 UNITS: 100 INJECTION, SOLUTION INTRAVENOUS; SUBCUTANEOUS at 12:43

## 2024-01-09 RX ADMIN — ALLOPURINOL 300 MG: 300 TABLET ORAL at 09:29

## 2024-01-09 RX ADMIN — LEVOTHYROXINE SODIUM 175 MCG: 0.03 TABLET ORAL at 06:13

## 2024-01-09 RX ADMIN — DOXYCYCLINE HYCLATE 100 MG: 100 TABLET, COATED ORAL at 09:34

## 2024-01-09 RX ADMIN — GABAPENTIN 400 MG: 400 CAPSULE ORAL at 12:32

## 2024-01-09 RX ADMIN — TORSEMIDE 40 MG: 20 TABLET ORAL at 12:32

## 2024-01-09 RX ADMIN — GABAPENTIN 400 MG: 400 CAPSULE ORAL at 09:29

## 2024-01-09 RX ADMIN — ACETAMINOPHEN 650 MG: 325 TABLET ORAL at 12:42

## 2024-01-09 ASSESSMENT — PAIN SCALES - GENERAL: PAINLEVEL_OUTOF10: 0

## 2024-01-09 NOTE — PROGRESS NOTES
HOSPITALISTS PROGRESS NOTE    1/3/2024 8:54 AM        Name: Brady Hernandez .              Admitted: 1/2/2024  Primary Care Provider: Kaden Pineda (Tel: [phone])      Brief Course: This 84-year-old male with PMH of chronic diastolic CHF,CKD 3, Afib, CAD diabetes mellitus who presented with anasarca, scrotal and BLE edema.  Of note, patient was admitted before [facility] and discharged home after couple of days of IV diuresis.  On admission, patient was hypotensive; started on milrinone drip.     Interval history:   Pt seen and examined today   Overnight events noted and interval ancillary notes reviewed.  On 2 L nasal NC satting around 91%.  Afebrile overnight, WBCs 2.6K.    Hgb; 7.6.  Check vitamin B12, folic acid, iron studies  Remains on milrinone and Lasix drips.  -1.6 L since admission creatinine down to 1.6  Pt resting in bed; reported that his edema has improved     but denied any fevers, chills, chest pain, palpitations, cough, SOB, dizziness, blurry vision, bowel or bladder dysfunction, any focal sensory or motor deficits.       Assessment & Plan:     Acute on chronic diastolic CHF  Recent ECHO showed normal EF, dilated IVC (non-collapsible) and Grade 3 DD   Cardiology on board; on milrinone and Lasix drips per their recs  Monitor electrolytes closely while on IV diuresis replace as needed.    Holding Cozaar and Farxiga.  K  Strict and output, daily weights.  Fluid and salt restriction    Elevated troponin; HS trop 391 on admission; trending down  Patient and family still undecided about invasive ischemic workup.    Cardiology board; plan for nuclear MPI on Friday  Monitor on telemetry and trend troponin    JHON on CKD stage III  Creatinine 2.0 on admission; down to 1.6.    Nephrology on board; continue IV diuresis.  Hold Cozaar and Farxiga   Avoid nephrotoxin, strict and output, daily weights and monitor renal function 
                                                                      HOSPITALISTS PROGRESS NOTE    1/5/2024 9:17 AM        Name: Brady Hernandez .              Admitted: 1/2/2024  Primary Care Provider: Kaden Pineda (Tel: [phone])      Brief Course: This 84-year-old male with PMH of chronic diastolic CHF,CKD 3, Afib, CAD diabetes mellitus who presented with anasarca, scrotal and BLE edema.  Of note, patient was admitted before [facility] and discharged home after couple of days of IV diuresis.  On admission, patient was hypotensive; started on milrinone drip.     Interval history:   Pt seen and examined today.  Overnight events noted, interval ancillary notes and labs reviewed.   BP remains soft; on milrinone drip.  Continue hold BP meds  Diuresing with Lasix drip; -7.9 L since admission.  Creatinine down to 1.2.  proBNP down to 2,357  Up in chair; reported scrotal pain and swelling.  Urology consulted        Assessment & Plan:     Acute on chronic diastolic CHF  Recent ECHO showed normal EF, dilated IVC (non-collapsible) and Grade 3 DD   Cardiology on board; on milrinone and Lasix drips per their recs  Monitor electrolytes closely while on IV diuresis and replace as needed.    Holding Cozaar and Farxiga.  Strict and output, daily weights.  Fluid and salt restriction    Elevated troponin; HS trop 391 on admission; trending down  Patient and family still not interested in invasive ischemic workup  Cardiology board; plan for Myoview. Monitor on telemetry     JHON on CKD stage III: Improving  Creatinine 2.0 on admission; down to 1. 4  Nephrology on board; continue IV diuresis.  Hold Cozaar and Farxiga   Avoid nephrotoxin, strict and output, daily weights and monitor renal function closely    Hypotension; on milrinone drip.  Hold BP meds for now    A-fib; on Eliquis.  Will switch to Lovenox if family agrees for [facility]. Monitor on telemetry    Hx of CAD; s/p CABG x 1 on 5/25.  Continue aspirin and 
                                                                      HOSPITALISTS PROGRESS NOTE    1/7/2024 8:06 AM        Name: Brady Hernandez .              Admitted: 1/2/2024  Primary Care Provider: Kaden Pineda (Tel: [phone])      Brief Course: This 84-year-old male with PMH of chronic diastolic CHF,CKD 3, Afib, CAD diabetes mellitus who presented with anasarca, scrotal and BLE edema.  Of note, patient was admitted before [facility] and discharged home after couple of days of IV diuresis.  On admission, patient was hypotensive; started on milrinone drip.     Interval history:   Pt seen and examined today.  Overnight events noted, interval ancillary notes and labs reviewed.   Remains on Lasix drip; -10.5 L since admission; creatinine trended up to 1.4  Up in chair; reported his BLE and scrotal edema has improved  Plan to continue Lasix drip for 1 more day.  Check BMP tomorrow        Assessment & Plan:     Acute on chronic diastolic CHF; improving  Recent ECHO showed normal EF, dilated IVC (non-collapsible) and Grade 3 DD   Cardiology on board; milrinone drip discontinued on 1/5/24.    Remains on Lasix drip; negative 10 L since admission.  proBNP trending down.    Monitor electrolytes closely while on IV diuresis and replace as needed.    Holding Cozaar and Farxiga.  Strict and output, daily weights.  Fluid and salt restriction    Hypotension; resolved.  Milrinone drip discontinued    Elevated troponin; HS trop 391 on admission; trending down  Patient and family still not interested in invasive ischemic workup. Cardiology input appreciated    JHON on CKD stage III: Resolved. Creatinine 2.0 on admission; 1.4 this morning  Nephrology on board; continue IV diuresis.  Hold Cozaar and Farxiga   Avoid nephrotoxin, strict and output, daily weights and monitor renal function closely    A-fib; continue Eliquis    Hx of CAD; s/p CABG x 1 on 5/25.  Continue aspirin and statins    Anemia; Hgb 8.0 on admission. 
                                                                      HOSPITALISTS PROGRESS NOTE    1/8/2024 8:49 AM        Name: Brady Hernandez .              Admitted: 1/2/2024  Primary Care Provider: Kaden Pineda (Tel: [phone])      Brief Course: This 84-year-old male with PMH of chronic diastolic CHF,CKD 3, Afib, CAD diabetes mellitus who presented with anasarca, scrotal and BLE edema. On admission, patient was hypotensive; started on milrinone drip.         Interval history:   Pt seen and examined today.  Overnight events noted, interval ancillary notes and labs reviewed.   On room air satting well.  Afebrile overnight, WBCs 2.8K  Diuresing with IV Lasix drip; -13 L since admission.    proBNP 2815.  Plan to continue Lasix drip for 1 more day  Up in bed reported that his BLE and scrotal edema slowly improving.    Denies any chest pain, shortness of breath, dizziness, palpitations, nausea vomiting or abdominal pain.          Assessment & Plan:     Acute on chronic diastolic CHF; improving  Recent ECHO showed normal EF, dilated IVC (non-collapsible) and Grade 3 DD   Cardiology on board; milrinone drip discontinued on 1/5/24.    Remains on Lasix drip; negative 13 L since admission.  [id number]  Monitor electrolytes closely while on IV diuresis and replace as needed.    Holding Cozaar and Farxiga.  Strict and output, daily weights.  Fluid and salt restriction    Hypotension; resolved.  Milrinone drip discontinued    Elevated troponin; HS trop 391 on admission; trending down  Patient and family still not interested in invasive ischemic workup. Cardiology input appreciated    JHON on CKD stage III: Resolved. Creatinine 2.0 on admission; 1.4 this morning  Nephrology on board; continue IV diuresis.  Hold Cozaar and Farxiga   Avoid nephrotoxin, strict and output, daily weights and monitor renal function closely    A-fib; continue Eliquis    Hx of CAD; s/p CABG x 1 on 5/25.  Continue aspirin and 
        The Draper Sleepiness Scale       The Draper Sleepiness Scale is widely used in the field of sleep medicine as a subjective measure of a patient's sleepiness. The test is a list of eight situations in which you rate your tendency to become sleepy on a scale of 0, no chance to 3, high chance of dozing. Your score is based on a scale of 0 to 24. The scale estimates whether you are experiencing excessive sleepiness that possibly requires medical attention.     How Sleepy Are You?  How sleepy are you to doze off or fall asleep in the following situations? You should rate your chances of dozing off, not just feeling tired. Even if you have not done some of these things recently try to determine how they would have affected you. For each situation, decide whether or not you would have:     0 = No chance of dozing 1 = Slight chance of dozing   2 = Moderate chance of  dozing 3 = High change of dozing       Situation                                                                                     Chance of Dozing    Sitting and reading  0 =  []  1 =    [x] 2 =    [] 3 =    []    Watching TV  0 =  []  1 =    [x] 2 =    [] 3 =    []      Sitting inactive in public place (e.g., a theater or a meeting)  0 =  [x]  1 =    [] 2 =    [] 3 =    []    As a passenger in a car for an hour without a break          0  =  [x]  1 =    [] 2 =    [] 3 =    []    Lying down to rest in the afternoon when circumstances permit    0 =  []  1 =    [] 2 =    [] 3 =    [x]    Sitting and talking to someone  0 =  [x]  1 =    [] 2 =    [] 3 =    []      Sitting quietly after a lunch without alcohol  0 =  [x]  1 =    [] 2 =    [] 3 =    []    In a car, while stopped for a few minutes in traffic                                                                      0 =  [x]  1 =    [] 2 =    [] 3 =    []    Total Score = 5    If your total score is 10 or greater, you are experiencing excessive sleepiness and should consider seeking a medical 
   01/06/24 0537   RT Protocol   History Pulmonary Disease 0   Respiratory pattern 0   Breath sounds 0   Cough 0   Indications for Bronchodilator Therapy On home bronchodilators   Bronchodilator Assessment Score 0       
  [facility] - Inpatient Rehabilitation Department   Phone: [phone]    Occupational Therapy    [] Initial Evaluation            [x] Daily Treatment Note         [] Discharge Summary      Patient: Brady Hernandez   : 1940   MRN: [id number]   Date of Service:  2024    Admitting Diagnosis:  Acute on chronic diastolic CHF (congestive heart failure) (HCC)  Current Admission Summary: Brady Hernandez is an 84M hx HFpEF, CAD s/p CABG >20 years ago at [place], HTN, KAREN, Chronic edema, DM w neuropathy presenting for anasarca, scrotal edema, and dyspnea. He was actually admitted for this right before dk, was diuresed for 1-2 days and left to go home for the holidays. In talking with his family, it sounds though that this dates back to months prior. He has been seeing multiple specialists for knee/leg swelling, was told it was likely related to knee replacement, cellulitis, lymphedema. He states he did diurese well, but after going home had no effect at all with Torsemide. His family says he can hardly walk around and do anything due to ongoing leg heaviness and pain. He came back yesterday and was started on Milrinone and lasix drips and having great UOP.    Past Medical History:  has a past medical history of Atrial fibrillation (HCC), CAD (coronary artery disease), Class 2 obesity, Coronary artery disease, Diabetes mellitus (HCC), Hyperlipidemia, Hypertension, Hypothyroidism, Left ventricular diastolic dysfunction, Osteoarthritis, and Right ventricular systolic heart failure.  Past Surgical History:  has a past surgical history that includes Carotid endarterectomy; sinus surgery; joint replacement; Foot surgery (Left); Revision total knee arthroplasty (Left, 2022); Knee Arthrotomy (Left, 2023); Revision total knee arthroplasty (Left, 2023); Coronary artery bypass graft; and Spine surgery.    Discharge Recommendations: Brady Hernandez scored a 16/24 on the AM-PAC ADL 
  [facility] - Inpatient Rehabilitation Department   Phone: [phone]    Occupational Therapy    [x] Initial Evaluation            [] Daily Treatment Note         [] Discharge Summary      Patient: Brady Hernandez   : 1940   MRN: [id number]   Date of Service:  1/3/2024    Admitting Diagnosis:  Acute on chronic diastolic CHF (congestive heart failure) (HCC)  Current Admission Summary: Brady Hernandez is an 84M hx HFpEF, CAD s/p CABG >20 years ago at [place], HTN, KAREN, Chronic edema, DM w neuropathy presenting for anasarca, scrotal edema, and dyspnea. He was actually admitted for this right before dk, was diuresed for 1-2 days and left to go home for the holidays. In talking with his family, it sounds though that this dates back to months prior. He has been seeing multiple specialists for knee/leg swelling, was told it was likely related to knee replacement, cellulitis, lymphedema. He states he did diurese well, but after going home had no effect at all with Torsemide. His family says he can hardly walk around and do anything due to ongoing leg heaviness and pain. He came back yesterday and was started on Milrinone and lasix drips and having great UOP.    Past Medical History:  has a past medical history of Atrial fibrillation (HCC), CAD (coronary artery disease), Class 2 obesity, Coronary artery disease, Diabetes mellitus (HCC), Hyperlipidemia, Hypertension, Hypothyroidism, Left ventricular diastolic dysfunction, Osteoarthritis, and Right ventricular systolic heart failure.  Past Surgical History:  has a past surgical history that includes Carotid endarterectomy; sinus surgery; joint replacement; Foot surgery (Left); Revision total knee arthroplasty (Left, 2022); Knee Arthrotomy (Left, 2023); Revision total knee arthroplasty (Left, 2023); Coronary artery bypass graft; and Spine surgery.    Discharge Recommendations: Brady Hernandez scored a 17/24 on the AM-PAC ADL 
  [facility] Daily Progress Note      Admit Date:  1/2/2024    Chief Complaint:  edema    Subjective:  Mr. Hernandez more alert and energetic today. Feels better. Significant edema remains. Cr improved. Again defers Nuc MPI/LHC    Objective:   BP (!) 93/54   Pulse 71   Temp 97.8 °F (36.6 °C) (Temporal)   Resp 18   Ht 1.829 m (6')   Wt 123 kg (271 lb 3.2 oz)   SpO2 100%   BMI 36.78 kg/m²     Intake/Output Summary (Last 24 hours) at 1/5/2024 1210  Last data filed at 1/5/2024 0436  Gross per 24 hour   Intake --   Output 3500 ml   Net -3500 ml       Physical Exam:  General:  Awake, alert, NAD  Skin:  Warm and dry  Neck:  JVD to mandible at 45 degrees  Chest:  Crackles throughout  Cardiovascular:  Irregular, S1S2, no S3, No murmur  Abdomen:  Soft, ND, NT, No HSM  Extremities:  2+ edema to abdomen    Medications:    apixaban  5 mg Oral BID    allopurinol  300 mg Oral Daily    docusate sodium  100 mg Oral BID    gabapentin  400 mg Oral 4x Daily    insulin glargine  20 Units SubCUTAneous Nightly    sodium chloride flush  5-40 mL IntraVENous 2 times per day    levothyroxine  175 mcg Oral QAM AC    doxycycline hyclate  100 mg Oral BID    insulin lispro  0-8 Units SubCUTAneous TID WC    insulin lispro  0-4 Units SubCUTAneous Nightly      sodium chloride      furosemide 5 mg/hr (01/04/24 1837)    milrinone 0.0625 mcg/kg/min (01/04/24 0936)    dextrose       albuterol, sodium chloride flush, sodium chloride, potassium chloride **OR** potassium alternative oral replacement **OR** potassium chloride, magnesium sulfate, ondansetron **OR** ondansetron, polyethylene glycol, acetaminophen **OR** acetaminophen, dextrose bolus **OR** dextrose bolus, glucagon (rDNA), dextrose    TELEMETRY (Personally reviewed by me): Atrial fibrillation     Lab Data:  CBC:   Recent Labs     01/03/24  0454 01/04/24  0558 01/05/24  0423   WBC 2.6* 4.5 3.2*   HGB 7.6* 7.7* 7.9*   HCT 24.0* 24.3* 25.5*   .9* 100.6* 101.0*   * 
  [facility] Daily Progress Note      Admit Date:  1/2/2024    Chief Complaint:  edema    Subjective:  Mr. Hernandez feels better. Still has anasarca but feels better. Urinating well and Cr improving    Objective:   /64   Pulse 62   Temp 97.1 °F (36.2 °C) (Temporal)   Resp 18   Ht 1.829 m (6')   Wt 124.2 kg (273 lb 12.8 oz)   SpO2 100%   BMI 37.13 kg/m²     Intake/Output Summary (Last 24 hours) at 1/4/2024 1348  Last data filed at 1/4/2024 0900  Gross per 24 hour   Intake 548.21 ml   Output 3125 ml   Net -2576.79 ml       Physical Exam:  General:  Awake, alert, NAD  Skin:  Warm and dry  Neck:  JVD to mandible at 45 degrees  Chest:  Crackles throughout  Cardiovascular:  Irregular, S1S2, no S3, No murmur  Abdomen:  Soft, ND, NT, No HSM  Extremities:  2+ edema to abdomen    Medications:    apixaban  2.5 mg Oral BID    allopurinol  300 mg Oral Daily    docusate sodium  100 mg Oral BID    gabapentin  400 mg Oral 4x Daily    insulin glargine  20 Units SubCUTAneous Nightly    sodium chloride flush  5-40 mL IntraVENous 2 times per day    levothyroxine  175 mcg Oral QAM AC    doxycycline hyclate  100 mg Oral BID    insulin lispro  0-8 Units SubCUTAneous TID WC    insulin lispro  0-4 Units SubCUTAneous Nightly      sodium chloride      furosemide 5 mg/hr (01/03/24 1738)    milrinone 0.0625 mcg/kg/min (01/04/24 0936)    dextrose       albuterol, sodium chloride flush, sodium chloride, potassium chloride **OR** potassium alternative oral replacement **OR** potassium chloride, magnesium sulfate, ondansetron **OR** ondansetron, polyethylene glycol, acetaminophen **OR** acetaminophen, dextrose bolus **OR** dextrose bolus, glucagon (rDNA), dextrose    TELEMETRY (Personally reviewed by me): Atrial fibrillation     Lab Data:  CBC:   Recent Labs     01/02/24  1214 01/03/24  0454 01/04/24  0558   WBC 3.3* 2.6* 4.5   HGB 8.0* 7.6* 7.7*   HCT 25.2* 24.0* 24.3*   .0 100.9* 100.6*    130* 138     BMP: 
  Urology Progress Note  [facility]     Patient: Brady Hernandez MRN: [id number]  Room/Bed: [facility]-2908/2908-01   YOB: 1940  Age/Sex: 84 y.o.male  Admission Date: 1/2/2024     Date of Service:  1/7/2024    ASSESSMENT/PLAN     Scrotal Swelling in setting of congestive heart failure  Bilat lower extremity swelling  Exam shows diffuse scrotal swelling, no signs of infection-similar to prior presentation  Horne catheter in place with clear yellow urine, buried penis with penile edema as well  PMH CAD, CABG, HTN, KAREN, HFpEF admitted with anasarca and SOB, diuresed about 10L on Milrinone and lasix gtt.       Recommendations:  Elevate the scrotum--discussed with patient and nurse  Monitor for infection  Will follow    All patient questions were answered. He understands the plan as listed above.    SUBJECTIVE     Chief Complaint:   Chief Complaint   Patient presents with    Groin Swelling     Pt presents to the ER complaining of swelling in the scrotum and legs. Pt was admitted for the same one week ago. Pt was dx with CHF. Pt is hypotensive on arrival also. Pt denies any CP, pt does state he has SOB on occasion. Pt describes decreased activity and ability to walk due to weakness and SOB.        24 Hour Events: Today patient reports no issues some improvement scrotal swelling with elevation overnight.  Otherwise pain is adequately controlled.  Tolerating diet.  Denies nausea/vomiting.  Ambulating.  No oxygen requirement.  No  symptoms.    OBJECTIVE     Hospital Problem List:  Principal Problem:    Acute on chronic diastolic CHF (congestive heart failure) ([facility])  Active Problems:    Essential hypertension    Hypothyroid    Diabetes mellitus type 2 in obese (HCC)    Coronary artery disease due to lipid rich plaque    History of atrial fibrillation    Acute kidney injury superimposed on CKD (HCC)  Resolved Problems:    * No resolved hospital problems. *      Physical Exam:  Vitals:    01/07/24 0400 
[facility]  HEART FAILURE  Progress Note      Admit Date 1/2/2024     Reason for Consult:      Reason for Consultation/Chief Complaint: edema    HPI:    Brady Hernandez is a 84 y.o. male with PMH CAD, CABG, HTN, KAREN, HFpEF admitted with anasarca and SOB. Troponin elevated but family has deferred ischemic eval. He has diuresed about 10L on Milrinone and lasix gtt.       Subjective:  Patient is being seen for CHF. There were no acute overnight cardiac events.   Today Mr. Hernandez is sitting in the chair and is feeling less SOB, still with edema but some improvement. He denies chest pain, palpitations, or dizziness  I spent 10 minutes educating the patient on heart failure, medications, lifestyle modifications and dietary guidelines.     Review of Systems - General ROS: negative  Respiratory ROS: positive for - shortness of breath  Cardiovascular ROS: negative  Gastrointestinal ROS: negative  Musculoskeletal ROS: positive for - edema  Neurological ROS: negative     Baseline Weight: 265 previously   Wt Readings from Last 3 Encounters:   01/06/24 121.7 kg (268 lb 6.4 oz)   12/22/23 120.2 kg (265 lb)   11/24/23 120.2 kg (265 lb)         Cardiac Testing:   ECHO 12/23/2023   Summary   Definity was administered for better endocardial definition.   Left ventricular cavity size is normal with mild to moderate concentric left ventricular hypertrophy. Ejection fraction is visually estimated to be 65%.  Cannot determine wall motion due to abnormal arrhythmia. Grade III diastolic dysfunction with elevated LV filling pressures. E/e' = 17.7.   Left atrium is dilated.   Right atrium is dilated.   Right coronary cusp appears fixed.   Mitral annular calcification is present.   Mild tricuspid regurgitation.   Right ventricle is dilated with reduced function by TAPSE: 1.41 cm. RVS velocity: 10.6 cm/s.   Aortic root is dilated. 3.6 cm.   Ascending aorta is dilated. 4.2 cm.   Trivial left pericardial effusion.   IVC size is 
Brady Hernandez  1/9/2024  [id number]    Chief Complaint: Acute on chronic diastolic CHF (congestive heart failure) (HCC)    HPI: Brady Hernandez is a 84 y.o. male with PMHx notable for hypertension, hyperlipidemia, HFpEF, CAD status post CABG, atrial fibrillation, KAREN who presented on 1/2/2024 with dyspnea, lower extremity and scrotal edema.  He was found to have markedly elevated BNP.  Cardiology was consulted, and he was started on IV Lasix for diuresis and required milrinone gtt for hypotension.     Hospital course complicated by: hypotension (off home BP meds, required milrinone gtt), JHON on CKD stage III (Nephrology consulted), elevated troponin (patient/family deferred invasive ischemic workup), traumatic bullae involving bilateral feet (podiatry consulted for wound care recommendations, no surgical intervention recommended), scotal edema (urology consulted, recommended scrotal elevation).    Currently, patient is doing well. He reports significant improvement in his scrotal edema, and some modest improvement in his LE swelling. He denies any chest pain or dyspnea. He feels his strength is improving. He is eager to discharge home, today if possible. He does not think he would benefit much from inpatient rehabilitation, due to prior experiences.     Past Medical History:   Diagnosis Date    Atrial fibrillation (HCC)     CAD (coronary artery disease)     Class 2 obesity     Coronary artery disease     Diabetes mellitus (HCC)     Hyperlipidemia     Hypertension     Hypothyroidism     Left ventricular diastolic dysfunction     Osteoarthritis     Right ventricular systolic heart failure        Past Surgical History:   Procedure Laterality Date    CAROTID ENDARTERECTOMY      CORONARY ARTERY BYPASS GRAFT      FOOT SURGERY Left     JOINT REPLACEMENT      KNEE ARTHROTOMY Left 03/09/2023    LEFT KNEE IRRIGATION AND DEBRIDEMENT-ADDUCTOR BLOCK; CHERRY performed by Fer Alejandro MD at [facility] OR    REVISION TOTAL 
CLINICAL PHARMACY NOTE: MEDS TO BEDS    Total # of Prescriptions Filled: 1   The following medications were delivered to the patient:  losartan    Additional Documentation:  Patient picked up  
[facility]  HEART FAILURE  Progress Note      Admit Date 1/2/2024     Reason for Consult:      Reason for Consultation/Chief Complaint: edema    HPI:    Brady Hernandez is a 84 y.o. male with PMH CAD, CABG, HTN, KAREN, HFpEF admitted with anasarca and SOB. Troponin elevated but family has deferred ischemic eval. He has diuresed about 10L on Milrinone and lasix gtt. Milrinone stopped Friday, 1/5/24      Subjective:  Patient is being seen for CHF. There were no acute overnight cardiac events.   Today Mr. Hernandez is in bed, just finished breakfast and denies denies chest pain, SOB palpitations, or dizziness. His edema is improving      Review of Systems - General ROS: negative  Respiratory ROS: negative  Cardiovascular ROS: negative  Gastrointestinal ROS: negative  Musculoskeletal ROS: positive for - edema  Neurological ROS: negative     Baseline Weight: 265 previously   Wt Readings from Last 3 Encounters:   01/07/24 118.9 kg (262 lb 3.2 oz)   12/22/23 120.2 kg (265 lb)   11/24/23 120.2 kg (265 lb)         Cardiac Testing:   ECHO 12/23/2023   Summary   Definity was administered for better endocardial definition.   Left ventricular cavity size is normal with mild to moderate concentric left ventricular hypertrophy. Ejection fraction is visually estimated to be 65%.  Cannot determine wall motion due to abnormal arrhythmia. Grade III diastolic dysfunction with elevated LV filling pressures. E/e' = 17.7.   Left atrium is dilated.   Right atrium is dilated.   Right coronary cusp appears fixed.   Mitral annular calcification is present.   Mild tricuspid regurgitation.   Right ventricle is dilated with reduced function by TAPSE: 1.41 cm. RVS velocity: 10.6 cm/s.   Aortic root is dilated. 3.6 cm.   Ascending aorta is dilated. 4.2 cm.   Trivial left pericardial effusion.   IVC size is dilated (>2.1 cm) and collapses < 50% with respiration   consistent with elevated RA pressure (15 mmHg).   Estimated pulmonary artery 
Facility/Department: [facility]  Speech Language Pathology  DYSPHAGIA BEDSIDE SWALLOW EVALUATION     Patient: Brady Hernandez   : 1940   MRN: [id number]      Evaluation Date: 1/3/2024   Admitting Diagnosis: Acute on chronic diastolic CHF (congestive heart failure) (HCC) [I50.33]  Acute on chronic congestive heart failure, unspecified heart failure type (HCC) [I50.9]  Pain: Denies                                                       H&P: 2024  \"Brady Hernandez is a 84 y.o. male with chronic diastolic CHF, CKD 3, Afib, CAD, DM 2 came to ER with complaints of scrotal and LE edema.  Patient was admitted before [place] and discharged to home.  He continued to have scrotal and LE edema.  Says [facility] could not be placed on last admission.  No CP, HA or fevers.  Hypotensive in ED.  No abdominal pain, nausea or vomiting.  Has followed with Dr Pradhan (ID) in past and was on PO Doxy for L leg for life his wife tells me.  Otherwise complete ROS is negative unless listed above.\"    Imaging:  Chest X-ray: 2024  IMPRESSION:  Cardiomegaly with pulmonary vascular congestion and small bilateral pleural  effusions.    History/Prior Level of Function:   Living Status: Private residence  Prior Dysphagia History: Pt seen at [facility] while inpatient 2023, initially on a dental soft diet and downgraded to Soft and Bite-Sized with thin liquids  Reason for referral: SLP evaluation orders received due to prior hx of dysphagia     Dysphagia Impressions/Diagnosis: Oropharyngeal Dysphagia   Pt alert and responsive, with appropriate participation in conversation. Positioned Upright in chair. On room air with RR <20/min. Oral motor exam revealed functional lingual, labial, and buccal ROM and coordination. Dentition was adequate for mastication, although pt reports using only right side of oral cavity to chew due to dental hx. Laryngeal function assessment revealed present volitional swallow, strong and congested volitional 
[facility]  Speech Language Pathology   Dysphagia Treatment Note    Patient: Brady Hernandez   : 1940   MRN: [id number]      Evaluation Date: 2024      Admitting Dx: Acute on chronic diastolic CHF (congestive heart failure) (HCC) [I50.33]  Acute on chronic congestive heart failure, unspecified heart failure type (HCC) [I50.9]  Treatment Diagnosis: Oropharyngeal Dysphagia   Pain: Denies                                              Diet and Treatment Recommendations 2024:  Diet Solids Recommendation:  Dysphagia III Soft and bite sized  Liquid Consistency Recommendation:  Thin liquids  Recommended form of Meds: Meds whole with water        Compensatory strategies:   Upright as possible with all PO intake, Small bites/sips, Eat/feed slowly, Remain upright 30-45 min      Assessment of Texture Tolerance:  Diet level prior to treatment: Dysphagia III Soft and bite sized , Thin liquids   Tolerance of Current Diet Level: Per chart, no noted difficulty with current diet level     Impressions: Pt was positioned upright in chair, alert and agreeable to participate in therapy session. Currently on 1L O2 via nasal cannula. Pt with congested breath sounds prior to PO trials. Trials of thin ice chips and soft and bite-sized solids were provided to assess swallow function. Oral phase characterized by functional labial seal, adequate mastication of solids, and adequate oral clearance. No immediate overt s/s of aspiration assessed across PO trials. Delayed congested cough 1x following PO trials. No increased WOB noted and vocal quality remained clear. Overall, pt demonstrates increased risk for aspiration due to co morbidities, deconditioning, and respiratory status. Based on today's assessment, recommend Dysphagia III Soft and bite sized with thin liquids, meds whole with water. If clinical s/s of aspiration continue to be noted, pt may benefit from an instrumental swallow evaluation to further 
[facility]  Speech Language Pathology   Dysphagia Treatment Note    Patient: Brady Hernandez   : 1940   MRN: [id number]      Evaluation Date: 2024      Admitting Dx: Acute on chronic diastolic CHF (congestive heart failure) (HCC) [I50.33]  Acute on chronic congestive heart failure, unspecified heart failure type (HCC) [I50.9]  Treatment Diagnosis: Oropharyngeal Dysphagia   Pain: Denies                                              Diet and Treatment Recommendations 2024:  Diet Solids Recommendation:  Regular texture diet  Liquid Consistency Recommendation:  Thin liquids  Recommended form of Meds: Meds whole with water        Compensatory strategies:   Upright as possible with all PO intake, Small bites/sips, Eat/feed slowly, Remain upright 30-45 min      Assessment of Texture Tolerance:  Diet level prior to treatment: Dysphagia III Soft and bite sized , Thin liquids   Tolerance of Current Diet Level: Per chart, no noted difficulty with current diet level     Impressions: Pt was positioned upright in chair, alert and agreeable to participate in therapy session. Currently on 1L O2 via nasal cannula. Pt was consuming lunch upon SLP entrance into room. Daughter present for session. Pt and daughter report pt would like his diet advanced. Pt with baseline congested cough (per report for many years). Trials of regular solids, soft solids and thin liquids were provided to assess swallow function. Pt demonstrated prolonged mastication, impulsive rate of intake, suspected pharyngeal pooling and suspected delayed swallow initiation. Throughout assessment episodic coughing was noted, pt and family report this is baseline for him. Slightly increased SOB was noted with intake. Overall aspiration can not definitively be ruled out at bedside however, symptoms did not appear to worsen with intake. Based on today's assessment, recommend advance to Regular texture diet with thin liquids, meds whole 
Facility/Department: [facility]  Speech Language Pathology   Dysphagia Treatment Note    Patient: Brady Hernandez   : 1940   MRN: [id number]      Evaluation Date: 2024      Admitting Dx: Acute on chronic diastolic CHF (congestive heart failure) (HCC) [I50.33]  Acute on chronic congestive heart failure, unspecified heart failure type (HCC) [I50.9]  Treatment Diagnosis: Oropharyngeal Dysphagia   Pain: Denies                                              Diet and Treatment Recommendations 2024:  Diet Solids Recommendation:  Regular texture diet  Liquid Consistency Recommendation:  Thin liquids  Recommended form of Meds: Meds whole with water        Compensatory strategies:   Upright as possible with all PO intake, Small bites/sips, Eat/feed slowly, Remain upright 30-45 min      Assessment of Texture Tolerance:  Diet level prior to treatment: Regular texture diet , Thin liquids   Tolerance of Current Diet Level: RN reported pt appears to be tolerating current diet level     Impressions: Pt was positioned upright in chair, alert and agreeable to participate in therapy session. Currently on room air. Pt denied difficulty with swallowing and/or current diet. Trials of thin liquids and regular solids were provided to assess swallow function. Pt demonstrated mildly prolonged mastication however timely AP transit and complete oral clearance. Suspect a mildly delayed swallow initiation with thin liquids however no overt clinical s/s of aspiration were assessed over successive drinks via straw. Education provided re: diet recommendations and general safe swallowing strategies to enhance safety with intake. Pt verbalized understanding but continued to decline difficulty with swallowing or need for strategies. Based on today's assessment, recommend continuation of Regular texture diet with thin liquids, meds whole with water and general safe swallowing precautions. No further dysphagia intervention indicated. 
Nutrition Education    Provided heart failure diet education. However, pt reported he has never been told her has a hx of HF prior to current admission. Education included low sodium diet guidelines (3-4 gm Na+/day) and fluid restriction (64 oz/day).  Reviewed foods to choose and foods to avoid, along with label reading and ways to add flavor to food.  Pt currently tries to follow a low sodium diet at home and minimally adds salt to certain food.  Pt states understanding of education.  Time spent with patient: 10 minutes.      Educated on 1/3  Learners: Patient and spouse  Readiness: Acceptance  Method: Explanation and Handout  Response: Verbalizes Understanding  Contact name and number provided.    Brissa Nelson, MS, RD, LD  Contact Number: [phone]    
Physical Therapy    [facility] - Inpatient Rehabilitation Department   Phone: [phone]    Physical Therapy    [] Initial Evaluation            [x] Daily Treatment Note         [] Discharge Summary      Patient: Brady Hernandez   : 1940   MRN: [id number]   Date of Service:  2024  Admitting Diagnosis: Acute on chronic diastolic CHF (congestive heart failure) (HCC)  Current Admission Summary: Brady Hernandez is an 84M hx HFpEF, CAD s/p CABG >20 years ago at [place], HTN, KAREN, Chronic edema, DM w neuropathy presenting for anasarca, scrotal edema, and dyspnea. He was actually admitted for this right before dk, was diuresed for 1-2 days and left to go home for the holidays. In talking with his family, it sounds though that this dates back to months prior. He has been seeing multiple specialists for knee/leg swelling, was told it was likely related to knee replacement, cellulitis, lymphedema. He states he did diurese well, but after going home had no effect at all with Torsemide. His family says he can hardly walk around and do anything due to ongoing leg heaviness and pain. He came back yesterday and was started on Milrinone and lasix drips and having great UOP.   Past Medical History:  has a past medical history of Atrial fibrillation (HCC), CAD (coronary artery disease), Class 2 obesity, Coronary artery disease, Diabetes mellitus (HCC), Hyperlipidemia, Hypertension, Hypothyroidism, Left ventricular diastolic dysfunction, Osteoarthritis, and Right ventricular systolic heart failure.  Past Surgical History:  has a past surgical history that includes Carotid endarterectomy; sinus surgery; joint replacement; Foot surgery (Left); Revision total knee arthroplasty (Left, 2022); Knee Arthrotomy (Left, 2023); Revision total knee arthroplasty (Left, 2023); Coronary artery bypass graft; and Spine surgery.    Discharge Recommendations: Brady Hernandez scored a 15/24 on the 
Pt arrived to floor via stretcher from ED and transferred to bed. Admission and assessment initiated. See doc flowsheet and admission tab. POC and education initiated and reviewed with patient. Educated on use of call light, phone, bed and within pt's reach. Denied further needs or questions at this time. Will continue to monitor.   
[facility]  HEART FAILURE  Progress Note      Admit Date 1/2/2024     Reason for Consult:      Reason for Consultation/Chief Complaint: edema    HPI:    Brady Hernandez is a 84 y.o. male with PMH CAD, CABG, HTN, KAREN, HFpEF admitted with anasarca and SOB. Troponin elevated but family has deferred ischemic eval. He has diuresed about 10L on Milrinone and lasix gtt. Milrinone stopped Friday, 1/5/24      Subjective:  Patient is being seen for CHF. There were no acute overnight cardiac events.   Today Mr. Hernandez is in the chair and denies denies chest pain, SOB palpitations, or dizziness. His edema is improving, he is frustrated and wants to go home.       Review of Systems - General ROS: negative  Respiratory ROS: negative  Cardiovascular ROS: negative  Gastrointestinal ROS: negative  Musculoskeletal ROS: positive for - edema  Neurological ROS: negative     Baseline Weight: 265 previously   Wt Readings from Last 3 Encounters:   01/08/24 118.6 kg (261 lb 8 oz)   12/22/23 120.2 kg (265 lb)   11/24/23 120.2 kg (265 lb)         Cardiac Testing:   ECHO 12/23/2023   Summary   Definity was administered for better endocardial definition.   Left ventricular cavity size is normal with mild to moderate concentric left ventricular hypertrophy. Ejection fraction is visually estimated to be 65%.  Cannot determine wall motion due to abnormal arrhythmia. Grade III diastolic dysfunction with elevated LV filling pressures. E/e' = 17.7.   Left atrium is dilated.   Right atrium is dilated.   Right coronary cusp appears fixed.   Mitral annular calcification is present.   Mild tricuspid regurgitation.   Right ventricle is dilated with reduced function by TAPSE: 1.41 cm. RVS velocity: 10.6 cm/s.   Aortic root is dilated. 3.6 cm.   Ascending aorta is dilated. 4.2 cm.   Trivial left pericardial effusion.   IVC size is dilated (>2.1 cm) and collapses < 50% with respiration   consistent with elevated RA pressure (15 mmHg).   
levothyroxine (SYNTHROID) 175 MCG tablet Take 1 tablet by mouth Daily      gabapentin (NEURONTIN) 400 MG capsule Take 1 capsule by mouth 4 times daily.      omeprazole (PRILOSEC) 20 MG delayed release capsule Take 1 capsule by mouth daily      SITagliptin (JANUVIA) 100 MG tablet Take 1 tablet by mouth daily      insulin glargine (LANTUS) 100 UNIT/ML injection vial Inject 38 Units into the skin nightly      insulin lispro (HUMALOG) 100 UNIT/ML SOLN injection vial Inject 0-10 Units into the skin 3 times daily (before meals) Sliding scale.      Probiotic Product (PROBIOTIC-10 PO) Take 1 tablet by mouth daily         Patient is no longer taking amlodipine, colchicine, losartan, or tramadol.    Of note, patient takes Doxycycline 100 mg tabs BID continuous for prevention of UTI; AM dose taken. Patient also takes Eliquis 5 mg BID: took AM dose and all other morning meds.    Timing of last doses updated.    Thank you,  Dafne Tapia, BELINDAhT      
short mobility form. Current research shows that an AM-PAC score of 17 or less is typically not associated with a discharge to the patient's home setting. Based on the patient's AM-PAC score and their current functional mobility deficits, it is recommended that the patient have 5-7 sessions per week of Physical Therapy at d/c to increase the patient's independence.  At this time, this patient demonstrates complex nursing, medical, and rehabilitative needs, and would benefit from intensive rehabilitation services upon discharge from the Inpatient setting.  This patient demonstrates the ability to participate in and benefit from an intensive therapy program with a coordinated interdisciplinary team approach to foster frequent, structured, and documented communication among disciplines, who will work together to establish, prioritize, and achieve treatment goals. Please see assessment section for further patient specific details. If patient discharges prior to next session this note will serve as a discharge summary.  Please see below for the latest assessment towards goals.    DME Required For Discharge: DME to be determined at next level of care, DME to be determined pending patient progress  Precautions/Restrictions: high fall risk  Weight Bearing Restrictions: no restrictions  [] Right Upper Extremity  [] Left Upper Extremity [] Right Lower Extremity  [] Left Lower Extremity     Required Braces/Orthotics: no braces required   [] Right  [] Left  Positional Restrictions:no positional restrictions    Pre-Admission Information   Lives With: spouse    Type of Home: house  Home Layout: one level  Home Access:  1 step to enter without rails   Bathroom Layout: walker accessible, walk in shower  Bathroom Equipment: grab bars in shower, built in shower seat, shower chair, hand held shower head  Toilet Height: elevated height  Home Equipment: rolling walker, single point cane, lift chair, reacher, sock aide, long handled shoe 
statins    Anemia; Hgb 8.0 on admission.   Iron studies s/o AOCD.  Vitamin B12 and folic acid WNL.  Monitor CBC closely    Diabetes mellitus; HgbA1c 7.0  on admission  Continue Lantus and SSI monitor glucose closely    Multiple traumatic bulla bilateral feet  Podiatry consulted recommended leaving blood-filled blister intact and fourth toe open wound bandaged  No further intervention recommended        Discussed with RN, case management, patient and cardiology    DVT PPX: Eliquis  Code:Limited    Disposition: Once acute medical issues have resolved    Current Medications  apixaban (ELIQUIS) tablet 5 mg, BID  pantoprazole (PROTONIX) tablet 40 mg, QAM AC  albuterol (PROVENTIL) (2.5 MG/3ML) 0.083% nebulizer solution 2.5 mg, BID PRN  allopurinol (ZYLOPRIM) tablet 300 mg, Daily  docusate sodium (COLACE) capsule 100 mg, BID  gabapentin (NEURONTIN) capsule 400 mg, 4x Daily  insulin glargine (LANTUS) injection vial 20 Units, Nightly  sodium chloride flush 0.9 % injection 5-40 mL, 2 times per day  sodium chloride flush 0.9 % injection 5-40 mL, PRN  0.9 % sodium chloride infusion, PRN  potassium chloride (KLOR-CON M) extended release tablet 40 mEq, PRN   Or  potassium bicarb-citric acid (EFFER-K) effervescent tablet 40 mEq, PRN   Or  potassium chloride 10 mEq/100 mL IVPB (Peripheral Line), PRN  magnesium sulfate 2000 mg in 50 mL IVPB premix, PRN  ondansetron (ZOFRAN-ODT) disintegrating tablet 4 mg, Q8H PRN   Or  ondansetron (ZOFRAN) injection 4 mg, Q6H PRN  polyethylene glycol (GLYCOLAX) packet 17 g, Daily PRN  acetaminophen (TYLENOL) tablet 650 mg, Q6H PRN   Or  acetaminophen (TYLENOL) suppository 650 mg, Q6H PRN  furosemide (LASIX) 100 mg in sodium chloride 0.9 % 100 mL infusion, Continuous  levothyroxine (SYNTHROID) tablet 175 mcg, QAM AC  dextrose bolus 10% 125 mL, PRN   Or  dextrose bolus 10% 250 mL, PRN  glucagon injection 1 mg, PRN  dextrose 10 % infusion, Continuous PRN  doxycycline hyclate (VIBRA-TABS) tablet 100 mg, 
12.8 oz)   SpO2 94%   BMI 37.13 kg/m²     Intake/Output Summary (Last 24 hours) at 1/4/2024 0915  Last data filed at 1/4/2024 0400  Gross per 24 hour   Intake 945.28 ml   Output 3175 ml   Net -2229.72 ml        Wt Readings from Last 3 Encounters:   01/04/24 124.2 kg (273 lb 12.8 oz)   12/22/23 120.2 kg (265 lb)   11/24/23 120.2 kg (265 lb)       Physical Examination:   General appearance:  No apparent distress, appears stated age and cooperative.  HEENT: Normocephalic, sclera clear., PERRLA.  Trachea midline, no adenopathy.  Cardiovascular: Regular rate and rhythm, normal S1, S2. No murmur.   Respiratory:Clear to auscultation bilaterally, no wheeze or crackles.   GI: Abdomen soft, no tenderness, not distended, normal bowel sounds  Musculoskeletal: No cyanosis in digits. BLE edema present  Neurology: CN 2-12 grossly intact. No speech or motor deficits  Psych: Not agitated, appropriate affect  Skin: Warm, dry, normal turgor. .Chronic LLE skin changes. Scar present on left knee    Labs and Tests:  CBC:   Recent Labs     01/02/24  1214 01/03/24  0454 01/04/24  0558   WBC 3.3* 2.6* 4.5   HGB 8.0* 7.6* 7.7*    130* 138       BMP:    Recent Labs     01/02/24  1214 01/03/24  0454 01/04/24  0558   * 139 141   K 4.5 4.4 3.8   CL 98* 101 102   CO2 32 31 35*   BUN 77* 69* 67*   CREATININE 2.0* 1.6* 1.4*   GLUCOSE 127* 168* 58*       Hepatic:   Recent Labs     01/02/24  1214   AST 26   ALT 16   BILITOT 0.4   ALKPHOS 97       XR CHEST PORTABLE   Final Result   Cardiomegaly with pulmonary vascular congestion and small bilateral pleural   effusions.             Problem List  Principal Problem:    Acute on chronic diastolic CHF (congestive heart failure) (HCC)  Active Problems:    Essential hypertension    Hypothyroid    Diabetes mellitus type 2 in obese (HCC)    Coronary artery disease due to lipid rich plaque    History of atrial fibrillation    Acute kidney injury superimposed on CKD (HCC)  Resolved Problems:    * 
(NYHA) class II and III HF and iron deficiency(ferritin <100 ng/ml or 100-300 ng/ml if transferrin saturation <20%), to improve functional status and QoL.      1. WEIGHT: Admit Weight - Scale: 120.2 kg (265 lb)      Today  Weight - Scale: 117.5 kg (259 lb 1.6 oz)   2. I/O   Intake/Output Summary (Last 24 hours) at 1/9/2024 1015  Last data filed at 1/9/2024 0906  Gross per 24 hour   Intake 600.6 ml   Output 4150 ml   Net -3549.4 ml       Assessment/Plan:     Acute Heart Failure:  ~ compensated  ~ BNP 3421>2357>2815,16L out  ~Plan:Milrinone stopped Friday,  stop lasix gtt, start po torsemide and restart home losartan, farxiga and torsemide 40mg/day, pt ok for discharge today, will f/u in CHF clinic in one week with labs  Hypotension:   ~improved  ~Plan: continue to monitor, restart HF meds   CAD:   ~Status: no CP  ~Meds:   Statin lipitor  ASA   ~Plan: no ischemic eval per family choice  Atrial Fib   ~stable   ~Plan:  nothing for rate control, continue AC                    JHON on CKD   ~Cr on admit 1.6>1.4>1.2>1.4>1.2  ~Daily labs; trend creatinine      All questions and concerns were addressed to the patient. Alternatives to my treatment were discussed. I have discussed the above stated plan with patient and the nurse. The patient verbalized understanding and agreed with the plan.    I appreciate the opportunity of cooperating in the care of this individual.    MORGAN HARRIS, APRN - CNP, ACNP, AGPCNP 1/9/2024, 10:15 AM  Heart Failure  [facility] [place] [address]  Ph: [phone]

## 2024-01-09 NOTE — CARE COORDINATION
[facility] Nurse  Consult Note       NAME:  Brady Hernandez  MEDICAL RECORD NUMBER:  [id number]  AGE: 84 y.o.   GENDER: male  : 1940  TODAY'S DATE:  2024    Subjective   Reason for WOCN Evaluation and Assessment: wound lower legs      Brady Hernandez is a 84 y.o. male referred by:   [x] Physician  [x] Nursing  [] Other:     Wound Identification:  Wound Type: venous  Contributing Factors: edema and /chf    Wound History: present on admission  Current Wound Care Treatment:  open to air    Patient Goal of Care:  [x] Wound Healing  [] Odor Control  [] Palliative Care  [] Pain Control   [] Other:         PAST MEDICAL HISTORY        Diagnosis Date    Atrial fibrillation (HCC)     CAD (coronary artery disease)     Class 2 obesity     Coronary artery disease     Diabetes mellitus (HCC)     Hyperlipidemia     Hypertension     Hypothyroidism     Left ventricular diastolic dysfunction     Osteoarthritis     Right ventricular systolic heart failure        PAST SURGICAL HISTORY    Past Surgical History:   Procedure Laterality Date    CAROTID ENDARTERECTOMY      CORONARY ARTERY BYPASS GRAFT      FOOT SURGERY Left     JOINT REPLACEMENT      KNEE ARTHROTOMY Left 2023    LEFT KNEE IRRIGATION AND DEBRIDEMENT-ADDUCTOR BLOCK; CHERRY performed by Fer Alejandro MD at [facility] OR    REVISION TOTAL KNEE ARTHROPLASTY Left 2022    REVISION LEFT TOTAL KNEE ARTHROPLASTY-CHERRY; ADDUCTOR BLOCK performed by Fer Alejandro MD at [facility] OR    REVISION TOTAL KNEE ARTHROPLASTY Left 2023    LEFT KNEE POLYETHLENE EXCHANGE WITH SYNOVECTOMY, AN\TIBIOTIC BEAD PLACEMENT performed by Fer Alejandro MD at [facility] OR    SINUS SURGERY      SPINE SURGERY         FAMILY HISTORY    History reviewed. No pertinent family history.    SOCIAL HISTORY    Social History     Tobacco Use    Smoking status: Never    Smokeless tobacco: Never   Vaping Use    Vaping Use: Never used   Substance Use 
   01/03/24 1144   Readmission Assessment   Number of Days since last admission? 1-7 days   Previous Disposition Home with Family   Who is being Interviewed Patient   What was the patient's/caregiver's perception as to why they think they needed to return back to the hospital? Other (Comment)  (\"medicine didn't work on my swelling so I had to come back in\")   Did you visit your Primary Care Physician after you left the hospital, before you returned this time? Yes   Did you see a specialist, such as Cardiac, Pulmonary, Orthopedic Physician, etc. after you left the hospital? No   Who advised the patient to return to the hospital? Physician   Does the patient report anything that got in the way of taking their medications? No   In our efforts to provide the best possible care to you and others like you, can you think of anything that we could have done to help you after you left the hospital the first time, so that you might not have needed to return so soon? Other (Comment)  (pt had no complaints about discharge instructions)       
   01/09/24 1445   IMM Letter   IMM Letter given to Patient/Family/Significant other/Guardian/POA/by: Chuyita Farmer RN CM - second notice   IMM Letter date given: 01/09/24   IMM Letter time given: 1440  (copy made for hard chart)       
ARU and palliative consults pending. Will follow for plan.      Chuyita Farmer RN, BSN  [phone]   
ARU recommended, pt has Medicare. Will follow for if pt is an ARU candidate.     Chuyita Farmer RN, BSN  [phone]   
[facility] Received home care referral. Will follow.     
Patient wanting to go home with home care services. [facility] liaison Molly aware from IDR's that pt is discharging today.     Patient discharged 1/9/2024 to home with [facility] services.   All discharge needs met per case management.      Chuyita Farmer RN, BSN  [phone]    
members/significant others, and if so, who? Yes  Plans to Return to Present Housing: Yes  Other Identified Issues/Barriers to RETURNING to current housing: None   Potential Assistance needed at discharge: Home Care            Potential DME:    Patient expects to discharge to: House  Plan for transportation at discharge: Family    Financial    Payor: MEDICARE / Plan: MEDICARE PART A AND B / Product Type: *No Product type* /     Does insurance require precert for SNF: No    Potential assistance Purchasing Medications: No  Meds-to-Beds request:        [id number] [place], [place] - [address] [place] [address] [phone] - F [phone]  [address] [facility] [address]  Phone: [phone] Fax: [phone]      Notes:    Factors facilitating achievement of predicted outcomes: Family support, Cooperative, and Pleasant    Barriers to discharge: None identified at this time     Additional Case Management Notes:     Pt from home with spouse.     Therapy pending at this time. If hc recommended pt would like to use [facility] services again.     Currently on Lasix and Primacor gtts.     CM will follow for discharge needs.    The Plan for Transition of Care is related to the following treatment goals of Acute on chronic diastolic CHF (congestive heart failure) (HCC) [I50.33]  Acute on chronic congestive heart failure, unspecified heart failure type (HCC) [I50.9]    IF APPLICABLE: The Patient and/or patient representative Brady and his family were provided with a choice of provider and agrees with the discharge plan. Freedom of choice list with basic dialogue that supports the patient's individualized plan of care/goals and shares the quality data associated with the providers was provided to: Patient   Patient Representative Name:       The Patient and/or Patient Representative Agree with the Discharge Plan? Yes    Chuyita Farmer RN, BSN  [phone]

## 2024-01-11 ENCOUNTER — TELEPHONE (OUTPATIENT)
Dept: OTHER | Age: 84
End: 2024-01-11

## 2024-01-11 NOTE — TELEPHONE ENCOUNTER
[facility]  HEART FAILURE PROGRAM  TELEPHONE ENCOUNTER FORM    Brady Hernandez 1940    Attempted to call patient for HF follow-up. No answer at this time.      Next MD/ Clinic appointment: 1/16 with Ольга LATIF, JOYCE 1/11/2024 1:22 PM

## 2024-01-12 ENCOUNTER — TELEPHONE (OUTPATIENT)
Dept: OTHER | Age: 84
End: 2024-01-12

## 2024-01-12 NOTE — TELEPHONE ENCOUNTER
[facility]  HEART FAILURE PROGRAM  TELEPHONE ENCOUNTER FORM    Brady Hernandez 1940    ASSESSMENT:   Baseline weight: 259 lbs  Current weight: scale broken- daughter ordered new one to be shipped soon  Patient weighing daily: knows to weigh daily  What are your symptoms of heart failure: dyspnea, edema  Are you having any symptoms:  No  Patient knows who to call with symptoms: Yes  Diet history:      Patient states sodium limitation is : 3000 mg      Patient states fluid limitation is 64 oz  Patient following diet as instructed: Yes  Medication history: taking medications as instructed Yes; medication side effects noted No  Patient is being active at home: Yes  Patient knows date and time of follow up appointment: Yes   Patient has transportation to appointments: Yes    RECOMMENDATIONS:   Medication: taking as prescribed  Diet: no added salt diet  MD/ Clinic appointment: 1/16 with Ольга Cheng NP  Other: Spoke with spouse. Patient doing well. Waiting for new scale to ship. Encouraged to call with any questions or concerns. Home care to come out next week to see patient.

## 2024-01-15 NOTE — PROGRESS NOTES
[facility]   Congestive Heart Failure    Primary Care Doctor:  Kaden Pineda  Primary Cardiologist: malia Yung pt         Chief Complaint:  CHF    History of Present Illness:  Brady Hernandez is a 84 y.o. male with PMH CAD, CABG, HTN, KAREN, HFpEF  who presents today for hospital f/u.   Brady Hernandez was admitted 1/2/24 and discharged 1/9/24.    Hospital course: admitted for acute on chronic dCHF, hypertension, elevated troponin and JHON on CKD     Acute on chronic diastolic CHF; improved.  Appears compensated  Recent ECHO showed normal EF, dilated IVC (non-collapsible) and Grade 3 DD   Cardiology consulted; per their recs, pt was started on IV Lasix and milrinone drip.  About 16 L of fluid removed.  BNP trended down.  Patient was switched to torsemide and restarted back on losartan  on discharge.  Pt was instructed to continue losartan follow-up cardiology in 1 week     Hypotension; resolved.  Milrinone drip discontinued     Elevated troponin; HS trop 391 on admission; trending down  Patient and family refused ischemic workup.     JHON on CKD stage III: Resolved. Creatinine 2.0 on admission; trended down  Nephrology on board; continue IV diuresis.  Hold Cozaar and Farxiga   Avoid nephrotoxin, strict and output, daily weights and monitor renal function closely     A-fib; continue Eliquis.     Hx of CAD; s/p CABG x 1 on 5/25.  Continue aspirin and statins     Anemia;  Iron studies s/o AOCD.  Vitamin B12 and folic acid WNL.      Diabetes mellitus; HgbA1c 7.0  on admission.  Continue current meds monitor BG closely     Multiple traumatic bulla bilateral feet  Podiatry consulted recommended leaving blood-filled blister intact and fourth toe open wound bandaged  No further intervention recommended     Scrotal edema likely 2/2 fluid overload in setting of CHF  Urology consulted; recommended elevating scrotum.       Since hospitalization he reports dyspnea, fatigue and denies chest pain, palpitations,

## 2024-01-16 ENCOUNTER — OFFICE VISIT (OUTPATIENT)
Dept: CARDIOLOGY CLINIC | Age: 84
End: 2024-01-16

## 2024-01-16 VITALS
DIASTOLIC BLOOD PRESSURE: 60 MMHG | HEART RATE: 68 BPM | HEIGHT: 72 IN | BODY MASS INDEX: 36.98 KG/M2 | WEIGHT: 273 LBS | SYSTOLIC BLOOD PRESSURE: 120 MMHG | OXYGEN SATURATION: 98 %

## 2024-01-16 DIAGNOSIS — Z09 HOSPITAL DISCHARGE FOLLOW-UP: ICD-10-CM

## 2024-01-16 DIAGNOSIS — I50.33 ACUTE ON CHRONIC DIASTOLIC CHF (CONGESTIVE HEART FAILURE) (HCC): Primary | ICD-10-CM

## 2024-01-16 DIAGNOSIS — I25.10 CORONARY ARTERY DISEASE DUE TO LIPID RICH PLAQUE: ICD-10-CM

## 2024-01-16 DIAGNOSIS — R06.02 SOB (SHORTNESS OF BREATH): ICD-10-CM

## 2024-01-16 DIAGNOSIS — I25.83 CORONARY ARTERY DISEASE DUE TO LIPID RICH PLAQUE: ICD-10-CM

## 2024-01-16 DIAGNOSIS — I10 ESSENTIAL HYPERTENSION: ICD-10-CM

## 2024-01-16 ASSESSMENT — ENCOUNTER SYMPTOMS
GASTROINTESTINAL NEGATIVE: 1
SHORTNESS OF BREATH: 1

## 2024-01-16 NOTE — PATIENT INSTRUCTIONS
www.heart.org  www.cardiosmart.org  www.AskforTaskitpInteractive Fateing.com      Websites with Low Sodium Recipes:   www.mayoclinic.org/healthy-lifestyle/recipes/low-sodium-recipes  www.StartBull/recipes    Smartphone elinor's that can help you keep track of symptoms, weights, and medications:  HF Storylines  HF Path  My HF  CareZone  Point of Care Heart Failure  WOWME  H2O Overload    Nutrition Elinor to track calories, carbohydrates and sodium content of food:   CalorieKing  MyFitnessPal    Low Sodium Meal Delivery  Top  Meals - offers senior discount  Meal Pro  Magic Kitchen  Mom's Meals - some insurance pays for  Bradley Christiansen  Silver Cuisine for 50  and over, AARP 10% discount     Balance for under 50  Metabolic Meals   EatClean       Medicines used for heart failure  Spironolactone/Eplerenone: Aldosterone receptor antagonists. These are a type of diuretic. They make the kidneys get rid of extra fluid and can help keep potassium levels normal.  Lisinopril: Angiotensin-converting enzyme (ACE) inhibitors help blood flow. They relax the blood vessels and lower your blood pressure. The heart can then pump more blood through your body without working harder.  Losartan/Valsartan: Angiotensin II receptor blockers (ARBs) work like ACE inhibitors. Some people use them instead.  Entresto: Angiotensin receptor neprilysin inhibitor (ARNI) medicine works like ARBs and ACE inhibitors and have an additional medicine that helps with fluid level and may let you decrease diuretic doses  Carvedilol/Metoprolol: Beta-blockers can slow the heart rate and decrease blood pressure, help decrease the workload of the heart  Digoxin relieves symptoms in some people with heart failure by slowing heart rate and increasing squeeze.  Furosemide/Torsemide/Bumetanide: Diuretics reduce swelling. They do this by helping the kidneys get rid of excess fluid. They are also called water pills.  Hydralazine. This may be taken with isosorbide to widen blood vessels. It

## 2024-01-17 ENCOUNTER — TELEMEDICINE (OUTPATIENT)
Dept: INFECTIOUS DISEASES | Age: 84
End: 2024-01-17
Payer: MEDICARE

## 2024-01-17 DIAGNOSIS — I10 ESSENTIAL HYPERTENSION: ICD-10-CM

## 2024-01-17 DIAGNOSIS — Z51.81 THERAPEUTIC DRUG MONITORING: ICD-10-CM

## 2024-01-17 DIAGNOSIS — T84.54XD INFECTION ASSOCIATED WITH INTERNAL LEFT KNEE PROSTHESIS, SUBSEQUENT ENCOUNTER: Primary | ICD-10-CM

## 2024-01-17 DIAGNOSIS — E03.9 HYPOTHYROIDISM, UNSPECIFIED TYPE: ICD-10-CM

## 2024-01-17 DIAGNOSIS — I25.83 CORONARY ARTERY DISEASE DUE TO LIPID RICH PLAQUE: ICD-10-CM

## 2024-01-17 DIAGNOSIS — E66.09 CLASS 2 OBESITY DUE TO EXCESS CALORIES WITHOUT SERIOUS COMORBIDITY WITH BODY MASS INDEX (BMI) OF 36.0 TO 36.9 IN ADULT: ICD-10-CM

## 2024-01-17 DIAGNOSIS — E11.69 DIABETES MELLITUS TYPE 2 IN OBESE (HCC): ICD-10-CM

## 2024-01-17 DIAGNOSIS — Z71.89 DIABETES EDUCATION, ENCOUNTER FOR: ICD-10-CM

## 2024-01-17 DIAGNOSIS — Z71.89 ENCOUNTER FOR MEDICATION COUNSELING: ICD-10-CM

## 2024-01-17 DIAGNOSIS — D61.818 PANCYTOPENIA (HCC): ICD-10-CM

## 2024-01-17 DIAGNOSIS — E66.9 DIABETES MELLITUS TYPE 2 IN OBESE (HCC): ICD-10-CM

## 2024-01-17 DIAGNOSIS — I25.10 CORONARY ARTERY DISEASE DUE TO LIPID RICH PLAQUE: ICD-10-CM

## 2024-01-17 PROCEDURE — 3051F HG A1C>EQUAL 7.0%<8.0%: CPT | Performed by: INTERNAL MEDICINE

## 2024-01-17 PROCEDURE — 99214 OFFICE O/P EST MOD 30 MIN: CPT | Performed by: INTERNAL MEDICINE

## 2024-01-17 PROCEDURE — 1111F DSCHRG MED/CURRENT MED MERGE: CPT | Performed by: INTERNAL MEDICINE

## 2024-01-17 PROCEDURE — 1123F ACP DISCUSS/DSCN MKR DOCD: CPT | Performed by: INTERNAL MEDICINE

## 2024-01-17 PROCEDURE — G8427 DOCREV CUR MEDS BY ELIG CLIN: HCPCS | Performed by: INTERNAL MEDICINE

## 2024-01-17 RX ORDER — DOXYCYCLINE HYCLATE 50 MG/1
50 TABLET, FILM COATED ORAL 2 TIMES DAILY
Qty: 60 TABLET | Refills: 5 | Status: SHIPPED | OUTPATIENT
Start: 2024-01-17 | End: 2024-07-15

## 2024-01-17 ASSESSMENT — ENCOUNTER SYMPTOMS
RHINORRHEA: 0
SHORTNESS OF BREATH: 0
SORE THROAT: 0
SINUS PRESSURE: 0
EYE REDNESS: 0
DIARRHEA: 0
ABDOMINAL PAIN: 0
EYE DISCHARGE: 0
BACK PAIN: 0
SINUS PAIN: 0
NAUSEA: 0
WHEEZING: 0
CONSTIPATION: 0
COUGH: 0

## 2024-01-17 NOTE — PROGRESS NOTES
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 pleasant male patient, who had an outpatient visit with me today for follow-up.    History was obtained from chart review and the patient. The patient had a virtual clinic visit with me today for above mentioned complaints.    The patient has history of left knee prosthetic joint infection for which he underwent DAIR (debridement, antibiotics and implant retention) approach surgery for prosthetic joint infection on 3/9/2023.  His index left total knee arthroplasty was several years ago and then he had revision arthroplasty, which had developed secondary infection leading to above surgery.    Patient is a surgical cultures from March 9, 2023 negative but diagnostic arthrocentesis done before that by orthopedics in their office was positive for corynebacterium striatum.    I use the patient IV vancomycin after surgery until April 26, 2023 and afterwards he was switched to oral doxycycline 100 mg every 12 hours for secondary antibiotic prophylaxis.    The patient did a follow-up video visit with me today with the help of his wife at bedside      Past Medical History:   Past medical and surgical history was reviewed by me during this visit in detail.    Past Medical History:   Diagnosis Date    Atrial fibrillation (HCC)     CAD (coronary artery disease)     Class 2 obesity     Coronary artery disease     Diabetes mellitus (HCC)     Hyperlipidemia     Hypertension     Hypothyroidism     Left ventricular diastolic dysfunction     Osteoarthritis     Right ventricular systolic heart failure        Past Surgical History:    Past Surgical

## 2024-01-22 ENCOUNTER — HOSPITAL ENCOUNTER (OUTPATIENT)
Dept: ONCOLOGY | Age: 84
Setting detail: INFUSION SERIES
Discharge: HOME | End: 2024-01-22
Payer: MEDICARE

## 2024-01-22 VITALS
TEMPERATURE: 98 F | RESPIRATION RATE: 16 BRPM | OXYGEN SATURATION: 99 % | DIASTOLIC BLOOD PRESSURE: 62 MMHG | HEART RATE: 63 BPM | SYSTOLIC BLOOD PRESSURE: 109 MMHG

## 2024-01-22 LAB
ABO + RH BLD: NORMAL
BLD GP AB SCN SERPL QL: NORMAL
BLOOD BANK DISPENSE STATUS: NORMAL
BLOOD BANK PRODUCT CODE: NORMAL
BPU ID: NORMAL
DESCRIPTION BLOOD BANK: NORMAL

## 2024-01-22 PROCEDURE — 6370000000 HC RX 637 (ALT 250 FOR IP): Performed by: STUDENT IN AN ORGANIZED HEALTH CARE EDUCATION/TRAINING PROGRAM

## 2024-01-22 PROCEDURE — 2580000003 HC RX 258: Performed by: STUDENT IN AN ORGANIZED HEALTH CARE EDUCATION/TRAINING PROGRAM

## 2024-01-22 PROCEDURE — P9016 RBC LEUKOCYTES REDUCED: HCPCS

## 2024-01-22 PROCEDURE — 99211 OFF/OP EST MAY X REQ PHY/QHP: CPT

## 2024-01-22 PROCEDURE — 86850 RBC ANTIBODY SCREEN: CPT

## 2024-01-22 PROCEDURE — 86900 BLOOD TYPING SEROLOGIC ABO: CPT

## 2024-01-22 PROCEDURE — 86901 BLOOD TYPING SEROLOGIC RH(D): CPT

## 2024-01-22 PROCEDURE — 36430 TRANSFUSION BLD/BLD COMPNT: CPT

## 2024-01-22 PROCEDURE — 86923 COMPATIBILITY TEST ELECTRIC: CPT

## 2024-01-22 RX ORDER — DIPHENHYDRAMINE HCL 25 MG
25 TABLET ORAL SEE ADMIN INSTRUCTIONS
Status: COMPLETED | OUTPATIENT
Start: 2024-01-22 | End: 2024-01-22

## 2024-01-22 RX ORDER — ACETAMINOPHEN 325 MG/1
650 TABLET ORAL SEE ADMIN INSTRUCTIONS
Status: COMPLETED | OUTPATIENT
Start: 2024-01-22 | End: 2024-01-22

## 2024-01-22 RX ORDER — SODIUM CHLORIDE 9 MG/ML
INJECTION, SOLUTION INTRAVENOUS PRN
Status: COMPLETED | OUTPATIENT
Start: 2024-01-22 | End: 2024-01-22

## 2024-01-22 RX ORDER — SODIUM CHLORIDE 0.9 % (FLUSH) 0.9 %
5-40 SYRINGE (ML) INJECTION ONCE
Status: COMPLETED | OUTPATIENT
Start: 2024-01-22 | End: 2024-01-22

## 2024-01-22 RX ADMIN — Medication 10 ML: at 11:12

## 2024-01-22 RX ADMIN — ACETAMINOPHEN 650 MG: 325 TABLET ORAL at 11:12

## 2024-01-22 RX ADMIN — DIPHENHYDRAMINE HYDROCHLORIDE 25 MG: 25 TABLET ORAL at 11:12

## 2024-01-22 RX ADMIN — SODIUM CHLORIDE: 9 INJECTION, SOLUTION INTRAVENOUS at 12:43

## 2024-01-22 ASSESSMENT — PAIN SCALES - GENERAL: PAINLEVEL_OUTOF10: 0

## 2024-01-22 NOTE — PROGRESS NOTES
Patient to infusion center in wheelchair, with wife at side.  Plan of care reviewed.  Consent form signed, IV started, type and crossmatch sent to lab to prepare unit of PRBC.  No questions or concerns stated.  Will await blood bank to prepare unit of blood.

## 2024-01-22 NOTE — PROGRESS NOTES
Blood transfusion given per [facility] policy. Transfusion complete. No signs of an adverse reaction. Given avs with signs and symptoms of a delayed reaction and when to call . All questions answered. Discharged per ambulatory with family member.

## 2024-02-05 ENCOUNTER — TELEPHONE (OUTPATIENT)
Dept: CARDIOLOGY CLINIC | Age: 84
End: 2024-02-05

## 2024-02-05 NOTE — TELEPHONE ENCOUNTER
Patient's wife phoned asking if his upcomming appointment with [facility] on 02/13/24 could be changed to a VV.  She states it too hard to get him in and out of the car.  Please call and advise. Thank you

## 2024-02-12 NOTE — PROGRESS NOTES
RA pressure (15 mmHg).   Estimated pulmonary artery systolic pressure is elevated at 53 mmHg assuming a right atrial pressure of 15 mmHg.     Device: none                       Past Medical History:   has a past medical history of Atrial fibrillation (HCC), CAD (coronary artery disease), Class 2 obesity, Coronary artery disease, Diabetes mellitus (HCC), Hyperlipidemia, Hypertension, Hypothyroidism, Left ventricular diastolic dysfunction, Osteoarthritis, and Right ventricular systolic heart failure.  Surgical History:   has a past surgical history that includes Carotid endarterectomy; sinus surgery; joint replacement; Foot surgery (Left); Revision total knee arthroplasty (Left, 07/14/2022); Knee Arthrotomy (Left, 03/09/2023); Revision total knee arthroplasty (Left, 03/09/2023); Coronary artery bypass graft; and Spine surgery.   Social History:   reports that he has never smoked. He has never used smokeless tobacco. He reports current alcohol use. He reports that he does not use drugs.   Family History:   No family history on file.    HomeMedications:  Prior to Admission medications    Medication Sig Start Date End Date Taking? Authorizing Provider   doxycycline hyclate 50 MG TABS Take 50 mg by mouth 2 times daily 1/17/24 7/15/24  Sorin Pradhan MD   losartan (COZAAR) 25 MG tablet Take 1 tablet by mouth daily 1/10/24   Monica Mccall MD   acetaminophen (TYLENOL) 325 MG tablet Take 2 tablets by mouth every 6 hours as needed for Pain    Belem Sampson MD   atorvastatin (LIPITOR) 40 MG tablet Take 1 tablet by mouth nightly    Belem Sampson MD   aspirin 81 MG EC tablet Take 1 tablet by mouth nightly    Belem Sampson MD   torsemide 40 MG TABS Take 40 mg by mouth daily 12/24/23   Bill Portillo MD   allopurinol (ZYLOPRIM) 300 MG tablet Take 1 tablet by mouth daily    Belem Sampson MD   dapagliflozin (FARXIGA) 5 MG tablet Take 1 tablet by mouth every morning    Belem Sampson MD

## 2024-02-13 ENCOUNTER — TELEMEDICINE (OUTPATIENT)
Dept: CARDIOLOGY CLINIC | Age: 84
End: 2024-02-13
Payer: MEDICARE

## 2024-02-13 DIAGNOSIS — R60.0 LOCALIZED EDEMA: ICD-10-CM

## 2024-02-13 DIAGNOSIS — I10 ESSENTIAL HYPERTENSION: ICD-10-CM

## 2024-02-13 DIAGNOSIS — I50.33 ACUTE ON CHRONIC DIASTOLIC CHF (CONGESTIVE HEART FAILURE) (HCC): Primary | ICD-10-CM

## 2024-02-13 DIAGNOSIS — R06.02 SOB (SHORTNESS OF BREATH): ICD-10-CM

## 2024-02-13 PROCEDURE — 99214 OFFICE O/P EST MOD 30 MIN: CPT | Performed by: NURSE PRACTITIONER

## 2024-02-13 PROCEDURE — 1123F ACP DISCUSS/DSCN MKR DOCD: CPT | Performed by: NURSE PRACTITIONER

## 2024-02-13 PROCEDURE — G8427 DOCREV CUR MEDS BY ELIG CLIN: HCPCS | Performed by: NURSE PRACTITIONER

## 2024-02-15 ENCOUNTER — TELEPHONE (OUTPATIENT)
Dept: CARDIOLOGY CLINIC | Age: 84
End: 2024-02-15

## 2024-02-15 PROBLEM — Z09 HOSPITAL DISCHARGE FOLLOW-UP: Status: RESOLVED | Noted: 2024-01-16 | Resolved: 2024-02-15

## 2024-02-15 RX ORDER — TORSEMIDE 20 MG/1
40 TABLET ORAL 2 TIMES DAILY
Qty: 360 TABLET | Refills: 0 | Status: SHIPPED | OUTPATIENT
Start: 2024-02-15

## 2024-02-15 NOTE — TELEPHONE ENCOUNTER
Quentin called to get a med change on Torsemide 40mg to 20mg due to cost would be lower.      Medication Refill    Medication needing refilled:  Torsemide     Dosage of the medication:  20mg    How are you taking this medication (QD, BID, TID, QID, PRN):  Take 20 mg by mouth 4 times daily     30 or 90 day supply called in: 180    When will you run out of your medication:    Which Pharmacy are we sending the medication to?:    [id number] [address] [facility] [address]  Phone: [phone]  Fax: [phone]

## 2024-02-22 DIAGNOSIS — I50.33 ACUTE ON CHRONIC DIASTOLIC CHF (CONGESTIVE HEART FAILURE) (HCC): Primary | ICD-10-CM

## 2024-02-22 RX ORDER — METOLAZONE 5 MG/1
2.5 TABLET ORAL DAILY PRN
Qty: 3 TABLET | Refills: 1 | Status: SHIPPED | OUTPATIENT
Start: 2024-02-22

## 2024-02-29 ENCOUNTER — PATIENT MESSAGE (OUTPATIENT)
Dept: CARDIOLOGY CLINIC | Age: 84
End: 2024-02-29

## 2024-02-29 ENCOUNTER — HOSPITAL ENCOUNTER (OUTPATIENT)
Dept: ONCOLOGY | Age: 84
Setting detail: INFUSION SERIES
Discharge: HOME | End: 2024-02-29
Payer: MEDICARE

## 2024-02-29 VITALS
TEMPERATURE: 96.9 F | DIASTOLIC BLOOD PRESSURE: 52 MMHG | HEART RATE: 47 BPM | OXYGEN SATURATION: 98 % | SYSTOLIC BLOOD PRESSURE: 102 MMHG | RESPIRATION RATE: 16 BRPM

## 2024-02-29 LAB
ABO + RH BLD: NORMAL
BLD GP AB SCN SERPL QL: NORMAL
BLOOD BANK DISPENSE STATUS: NORMAL
BLOOD BANK DISPENSE STATUS: NORMAL
BLOOD BANK PRODUCT CODE: NORMAL
BLOOD BANK PRODUCT CODE: NORMAL
BPU ID: NORMAL
BPU ID: NORMAL
DESCRIPTION BLOOD BANK: NORMAL
DESCRIPTION BLOOD BANK: NORMAL

## 2024-02-29 PROCEDURE — 96374 THER/PROPH/DIAG INJ IV PUSH: CPT

## 2024-02-29 PROCEDURE — 6370000000 HC RX 637 (ALT 250 FOR IP): Performed by: INTERNAL MEDICINE

## 2024-02-29 PROCEDURE — 6360000002 HC RX W HCPCS: Performed by: INTERNAL MEDICINE

## 2024-02-29 PROCEDURE — 36430 TRANSFUSION BLD/BLD COMPNT: CPT

## 2024-02-29 PROCEDURE — 86923 COMPATIBILITY TEST ELECTRIC: CPT

## 2024-02-29 PROCEDURE — 99211 OFF/OP EST MAY X REQ PHY/QHP: CPT

## 2024-02-29 PROCEDURE — 86901 BLOOD TYPING SEROLOGIC RH(D): CPT

## 2024-02-29 PROCEDURE — 2580000003 HC RX 258: Performed by: INTERNAL MEDICINE

## 2024-02-29 PROCEDURE — 86900 BLOOD TYPING SEROLOGIC ABO: CPT

## 2024-02-29 PROCEDURE — 86850 RBC ANTIBODY SCREEN: CPT

## 2024-02-29 PROCEDURE — P9016 RBC LEUKOCYTES REDUCED: HCPCS

## 2024-02-29 RX ORDER — FUROSEMIDE 10 MG/ML
20 INJECTION INTRAMUSCULAR; INTRAVENOUS SEE ADMIN INSTRUCTIONS
Status: COMPLETED | OUTPATIENT
Start: 2024-02-29 | End: 2024-02-29

## 2024-02-29 RX ORDER — SODIUM CHLORIDE 0.9 % (FLUSH) 0.9 %
5-40 SYRINGE (ML) INJECTION ONCE
Status: COMPLETED | OUTPATIENT
Start: 2024-02-29 | End: 2024-02-29

## 2024-02-29 RX ORDER — DIPHENHYDRAMINE HCL 25 MG
25 TABLET ORAL SEE ADMIN INSTRUCTIONS
Status: COMPLETED | OUTPATIENT
Start: 2024-02-29 | End: 2024-02-29

## 2024-02-29 RX ORDER — ACETAMINOPHEN 325 MG/1
650 TABLET ORAL SEE ADMIN INSTRUCTIONS
Status: COMPLETED | OUTPATIENT
Start: 2024-02-29 | End: 2024-02-29

## 2024-02-29 RX ORDER — SODIUM CHLORIDE 9 MG/ML
INJECTION, SOLUTION INTRAVENOUS PRN
Status: COMPLETED | OUTPATIENT
Start: 2024-02-29 | End: 2024-02-29

## 2024-02-29 RX ADMIN — SODIUM CHLORIDE: 9 INJECTION, SOLUTION INTRAVENOUS at 09:52

## 2024-02-29 RX ADMIN — ACETAMINOPHEN 650 MG: 325 TABLET ORAL at 11:06

## 2024-02-29 RX ADMIN — DIPHENHYDRAMINE HYDROCHLORIDE 25 MG: 25 TABLET ORAL at 09:35

## 2024-02-29 RX ADMIN — FUROSEMIDE 20 MG: 10 INJECTION, SOLUTION INTRAMUSCULAR; INTRAVENOUS at 13:44

## 2024-02-29 RX ADMIN — Medication 10 ML: at 09:34

## 2024-02-29 NOTE — PROGRESS NOTES
Blood transfusion given per [facility] policy. Transfusion complete. No signs of an adverse reaction. Given avs with signs and symptoms of a delayed reaction and when to call . All questions answered. Discharged per W/C with family member.

## 2024-02-29 NOTE — PROGRESS NOTES
Call placed to Ольга Cheng NP from [facility]. Aware Patient's heart rate running consistently 45 and regular and B/P 103/45. Pt will get Lasix 20 mg IVP after completion of blood transfusion. Pt has virtual visit 3/5/24

## 2024-02-29 NOTE — TELEPHONE ENCOUNTER
From: Brady Hernandez  To: Ольга Cehng  Sent: 2/29/2024 3:37 PM EST  Subject: Follow-up    With the lasik you ordered today, what dose of Torsemide for today and tomorrow?    Also, is there anything we can take for sleep. Don is not sleeping more than 1 hour at a time.

## 2024-02-29 NOTE — PROGRESS NOTES
First unit PRBC completed at this time Pt ron well. Pt requesting dose of Lasix be given after completion of second unit PRBC

## 2024-03-01 NOTE — TELEPHONE ENCOUNTER
Spoke to patient's daughter she wanted to confirm he is taking torsemide 40 mg daily unless his weight increased. Spoke to patient's daughter she verbalized understanding.

## 2024-03-04 ASSESSMENT — ENCOUNTER SYMPTOMS
COUGH: 1
GASTROINTESTINAL NEGATIVE: 1
SHORTNESS OF BREATH: 1

## 2024-03-04 NOTE — PROGRESS NOTES
01/08/2024 06:14 AM    WBC 3.2 01/07/2024 04:30 AM    WBC 3.1 01/06/2024 04:49 AM    RBC 2.52 01/08/2024 06:14 AM    RBC 2.60 01/07/2024 04:30 AM    RBC 2.55 01/06/2024 04:49 AM    HGB 8.1 01/08/2024 06:14 AM    HGB 8.4 01/07/2024 04:30 AM    HGB 8.1 01/06/2024 04:49 AM    HCT 25.5 01/08/2024 06:14 AM    HCT 26.3 01/07/2024 04:30 AM    HCT 25.7 01/06/2024 04:49 AM    .2 01/08/2024 06:14 AM    .0 01/07/2024 04:30 AM    .6 01/06/2024 04:49 AM    RDW 24.9 01/08/2024 06:14 AM    RDW 23.9 01/07/2024 04:30 AM    RDW 24.4 01/06/2024 04:49 AM     01/08/2024 06:14 AM     01/07/2024 04:30 AM     01/06/2024 04:49 AM     BMP:  Lab Results   Component Value Date/Time     01/09/2024 05:31 AM     01/07/2024 04:30 AM     01/06/2024 04:49 AM    K 4.1 01/09/2024 05:31 AM    K 4.9 01/08/2024 07:11 PM    K 4.5 01/08/2024 07:10 AM    K 4.2 01/07/2024 05:20 PM    K 4.1 12/24/2023 05:29 AM    K 4.2 12/22/2023 11:40 AM    CL 99 01/09/2024 05:31 AM    CL 98 01/07/2024 04:30 AM    CL 99 01/06/2024 04:49 AM    CO2 37 01/09/2024 05:31 AM    CO2 35 01/07/2024 04:30 AM    CO2 36 01/06/2024 04:49 AM    PHOS 4.5 01/02/2024 12:14 PM    BUN 41 01/09/2024 05:31 AM    BUN 49 01/07/2024 04:30 AM    BUN 51 01/06/2024 04:49 AM    CREATININE 1.2 01/09/2024 05:31 AM    CREATININE 1.4 01/07/2024 04:30 AM    CREATININE 1.2 01/06/2024 04:49 AM     BNP:   Lab Results   Component Value Date/Time    PROBNP 2,813 01/08/2024 07:10 AM    PROBNP 2,357 01/05/2024 04:23 AM    PROBNP 3,421 01/02/2024 12:14 PM     Iron Studies:    Lab Results   Component Value Date/Time    TIBC 254 01/05/2024 04:23 AM    TIBC 280 01/02/2024 09:00 PM    FERRITIN 72.0 01/03/2024 04:54 AM           Assessment/Plan:    1. Acute on chronic diastolic CHF (congestive heart failure) (HCC) - overloaded, continue torsemide 40mg bid, take a dose of metolazone if wt 280 or higher, labs next week, continue farxiga and ARB   2. Essential

## 2024-03-05 ENCOUNTER — TELEMEDICINE (OUTPATIENT)
Dept: CARDIOLOGY CLINIC | Age: 84
End: 2024-03-05
Payer: MEDICARE

## 2024-03-05 DIAGNOSIS — I25.10 CORONARY ARTERY DISEASE DUE TO LIPID RICH PLAQUE: ICD-10-CM

## 2024-03-05 DIAGNOSIS — I10 ESSENTIAL HYPERTENSION: ICD-10-CM

## 2024-03-05 DIAGNOSIS — I50.33 ACUTE ON CHRONIC DIASTOLIC CHF (CONGESTIVE HEART FAILURE) (HCC): Primary | ICD-10-CM

## 2024-03-05 DIAGNOSIS — I25.83 CORONARY ARTERY DISEASE DUE TO LIPID RICH PLAQUE: ICD-10-CM

## 2024-03-05 DIAGNOSIS — R60.0 LOCALIZED EDEMA: ICD-10-CM

## 2024-03-05 PROCEDURE — 1123F ACP DISCUSS/DSCN MKR DOCD: CPT | Performed by: NURSE PRACTITIONER

## 2024-03-05 PROCEDURE — 99214 OFFICE O/P EST MOD 30 MIN: CPT | Performed by: NURSE PRACTITIONER

## 2024-03-05 PROCEDURE — G8427 DOCREV CUR MEDS BY ELIG CLIN: HCPCS | Performed by: NURSE PRACTITIONER

## 2024-03-21 ENCOUNTER — PATIENT MESSAGE (OUTPATIENT)
Dept: CARDIOLOGY CLINIC | Age: 84
End: 2024-03-21

## 2024-03-28 LAB
B-TYPE NATRIURETIC PEPTIDE: 4969 PG/ML (ref 0–125)
BUN / CREAT RATIO: 43 (ref 7–25)
BUN BLDV-MCNC: 73 MG/DL (ref 3–29)
CALCIUM SERPL-MCNC: 8.1 MG/DL (ref 8.5–10.5)
CHLORIDE BLD-SCNC: 91 MEQ/L (ref 96–110)
CO2: 35 MEQ/L (ref 19–32)
COMMENT: NORMAL
CREAT SERPL-MCNC: 1.7 MG/DL (ref 0.5–1.2)
ESTIMATED GLOMERULAR FILTRATION RATE CREATININE EQUATION: 39 MLS/MIN/1.73M2
FASTING STATUS: ABNORMAL
GLUCOSE BLD-MCNC: 136 MG/DL (ref 70–99)
POTASSIUM SERPL-SCNC: 4 MEQ/L (ref 3.4–5.3)
SODIUM BLD-SCNC: 135 MEQ/L (ref 135–148)

## 2024-03-29 NOTE — TELEPHONE ENCOUNTER
----- Message from PATY Gil CNP sent at 3/29/2024  8:56 AM EDT -----  Kidney function is a little better, BNP pretty high, stay off losartan for now, continue higher dose torsemide and he can take another metolazone this weekend if needed for sx. KATIE    Spoke to patient's daughter She asking if you can check his Hemoglobin and order blood transfusions if needed.

## 2024-04-02 NOTE — TELEPHONE ENCOUNTER
Tried to reach patient's daughter LMOM that lab orders were faxed to the N today, confirmation received.

## (undated) DEVICE — ZIMMER® STERILE DISPOSABLE TOURNIQUET CUFF WITH PLC, DUAL PORT, SINGLE BLADDER, 30 IN. (76 CM)

## (undated) DEVICE — STERILE LATEX POWDER-FREE SURGICAL GLOVESWITH NITRILE COATING: Brand: PROTEXIS

## (undated) DEVICE — T-DRAPE,EXTREMITY,STERILE: Brand: MEDLINE

## (undated) DEVICE — HOOD, PEEL-AWAY: Brand: FLYTE

## (undated) DEVICE — BANDAGE COBAN 6 IN WND 6INX5YD FOAM

## (undated) DEVICE — STANDARD HYPODERMIC NEEDLE,POLYPROPYLENE HUB: Brand: MONOJECT

## (undated) DEVICE — HOOD: Brand: FLYTE

## (undated) DEVICE — CONTAINER,SPECIMEN,OR STERILE,4OZ: Brand: MEDLINE

## (undated) DEVICE — APPLICATOR PREP 26ML 0.7% IOD POVACRYLEX 74% ISO ALC ST

## (undated) DEVICE — MERCY FAIRFIELD TURNOVER KIT: Brand: MEDLINE INDUSTRIES, INC.

## (undated) DEVICE — GAUZE,SPONGE,4"X4",8PLY,STRL,LF,10/TRAY: Brand: MEDLINE

## (undated) DEVICE — DUAL CUT SAGITTAL BLADE

## (undated) DEVICE — SCREW BNE HD 3.5X48 MM HEX PERSONA
Type: IMPLANTABLE DEVICE | Site: KNEE | Status: NON-FUNCTIONAL
Removed: 2022-07-14

## (undated) DEVICE — SYRINGE, LUER LOCK, 10ML: Brand: MEDLINE

## (undated) DEVICE — DRESSING CNTCT 4X12IN IS THERABOND 3D

## (undated) DEVICE — STERILE TOTAL KNEE DRAPE PACK: Brand: CARDINAL HEALTH

## (undated) DEVICE — COVER,MAYO STAND,XL,STERILE: Brand: MEDLINE

## (undated) DEVICE — YANKAUER,OPEN TIP,W/O VENT,STERILE: Brand: MEDLINE INDUSTRIES, INC.

## (undated) DEVICE — MAJOR SET UP PK

## (undated) DEVICE — BANDAGE COMPR W6INXL4.5YD LTWT E EC SGL LAYERED CLP CLSR

## (undated) DEVICE — SOLUTION IV IRRIG WATER 1000ML POUR BRL 2F7114

## (undated) DEVICE — RECIPROCATING BLADE, DOUBLE SIDED, OFFSET  (70.0 X 0.64 X 12.6MM)

## (undated) DEVICE — SUTURE ETHBND SZ 1 L18IN NONABSORBABLE CTX L48MM 1/2 CIR CX30D

## (undated) DEVICE — DECANTER FLD 9IN ST BG FOR ASEP TRNSF OF FLD

## (undated) DEVICE — SUTURE VCRL + SZ 0 L18IN ABSRB UD L36MM CT-1 1/2 CIR VCP840D

## (undated) DEVICE — SCREW BNE HEX HD 3.5 MM
Type: IMPLANTABLE DEVICE | Site: KNEE | Status: NON-FUNCTIONAL
Removed: 2022-07-14

## (undated) DEVICE — KNEE HOLDER DISPOSABLE LINER: Brand: ALVARADO®  KNEE SUPPORT

## (undated) DEVICE — SUTURE MCRYL + SZ 3-0 L27IN ABSRB UD PS1 L24MM 3/8 CIR REV MCP936H

## (undated) DEVICE — FAIRFIELD KNEE LF

## (undated) DEVICE — Device

## (undated) DEVICE — SHEET,DRAPE,53X77,STERILE: Brand: MEDLINE

## (undated) DEVICE — SUTURE VCRL + SZ 2-0 L18IN ABSRB UD CT1 L36MM 1/2 CIR VCP839D

## (undated) DEVICE — TOTAL BASIC PK

## (undated) DEVICE — BANDAGE COMPR W6INXL10YD ST M E WHITE/BEIGE

## (undated) DEVICE — HYPODERMIC SAFETY NEEDLE: Brand: MAGELLAN

## (undated) DEVICE — DRAPE,REIN 53X77,STERILE: Brand: MEDLINE

## (undated) DEVICE — HANDPIECE SET WITH HIGH FLOW TIP AND SUCTION TUBE: Brand: INTERPULSE

## (undated) DEVICE — 450 ML BOTTLE OF 0.05% CHLORHEXIDINE GLUCONATE IN 99.95% STERILE WATER FOR IRRIGATION, USP AND APPLICATOR.: Brand: IRRISEPT ANTIMICROBIAL WOUND LAVAGE

## (undated) DEVICE — SUTURE ETHLN SZ 3-0 L30IN NONABSORBABLE BLK L36MM FSLX 3/8 1673BH

## (undated) DEVICE — HEADLESS TROCHAR PIN 75MM: Brand: ZUK

## (undated) DEVICE — SPLINT ORTH CLOSED PAT UNIV 20 IN BLK PROCARE QUICK-FIT

## (undated) DEVICE — SOLUTION IRRIG 2000ML 0.9% SOD CHL USP UROMATIC PLAS CONT

## (undated) DEVICE — DRAPE,U/ SHT,SPLIT,PLAS,STERIL: Brand: MEDLINE

## (undated) DEVICE — PAD ABSRB W8XL10IN ABD HYDROPHOBIC NONWOVEN THCK LAYR CELOS

## (undated) DEVICE — 3M™ IOBAN™ 2 ANTIMICROBIAL INCISE DRAPE 6650EZ: Brand: IOBAN™ 2

## (undated) DEVICE — SYSTEM SKIN CLSR 22CM DERMBND PRINEO

## (undated) DEVICE — ELECTRODE PT RET AD L9FT HI MOIST COND ADH HYDRGEL CORDED

## (undated) DEVICE — COVER LT HNDL BLU PLAS

## (undated) DEVICE — LOTION PREP REMV 5OZ IODO CLR TINC OF BENZ DURAPREP

## (undated) DEVICE — SUTURE ETHBND EXCEL SZ 0 L18IN NONABSORBABLE GRN L36MM CT-1 CX21D

## (undated) DEVICE — BLANKET WRM W29.9XL79.1IN UP BODY FORC AIR MISTRAL-AIR

## (undated) DEVICE — SHEET,DRAPE,40X58,STERILE: Brand: MEDLINE